# Patient Record
Sex: MALE | Race: WHITE | Employment: FULL TIME
[De-identification: names, ages, dates, MRNs, and addresses within clinical notes are randomized per-mention and may not be internally consistent; named-entity substitution may affect disease eponyms.]

---

## 2017-01-27 ENCOUNTER — OFFICE VISIT (OUTPATIENT)
Dept: FAMILY MEDICINE CLINIC | Facility: CLINIC | Age: 57
End: 2017-01-27

## 2017-01-27 VITALS
OXYGEN SATURATION: 96 % | RESPIRATION RATE: 26 BRPM | BODY MASS INDEX: 28.08 KG/M2 | HEART RATE: 64 BPM | WEIGHT: 174 LBS | SYSTOLIC BLOOD PRESSURE: 141 MMHG | DIASTOLIC BLOOD PRESSURE: 86 MMHG

## 2017-01-27 DIAGNOSIS — Z23 NEED FOR TDAP VACCINATION: ICD-10-CM

## 2017-01-27 DIAGNOSIS — I10 ESSENTIAL HYPERTENSION: Primary | ICD-10-CM

## 2017-01-27 DIAGNOSIS — I42.9 CARDIOMYOPATHY (HCC): ICD-10-CM

## 2017-01-27 DIAGNOSIS — I50.32 CHRONIC DIASTOLIC CONGESTIVE HEART FAILURE (HCC): ICD-10-CM

## 2017-01-27 DIAGNOSIS — Z12.11 ENCOUNTER FOR SCREENING COLONOSCOPY: ICD-10-CM

## 2017-01-27 DIAGNOSIS — I48.0 PAF (PAROXYSMAL ATRIAL FIBRILLATION) (HCC): ICD-10-CM

## 2017-01-27 PROCEDURE — 3017F COLORECTAL CA SCREEN DOC REV: CPT | Performed by: FAMILY MEDICINE

## 2017-01-27 PROCEDURE — G8419 CALC BMI OUT NRM PARAM NOF/U: HCPCS | Performed by: FAMILY MEDICINE

## 2017-01-27 PROCEDURE — G8484 FLU IMMUNIZE NO ADMIN: HCPCS | Performed by: FAMILY MEDICINE

## 2017-01-27 PROCEDURE — 90471 IMMUNIZATION ADMIN: CPT | Performed by: FAMILY MEDICINE

## 2017-01-27 PROCEDURE — 90715 TDAP VACCINE 7 YRS/> IM: CPT | Performed by: FAMILY MEDICINE

## 2017-01-27 PROCEDURE — G8427 DOCREV CUR MEDS BY ELIG CLIN: HCPCS | Performed by: FAMILY MEDICINE

## 2017-01-27 PROCEDURE — 1036F TOBACCO NON-USER: CPT | Performed by: FAMILY MEDICINE

## 2017-01-27 PROCEDURE — 99214 OFFICE O/P EST MOD 30 MIN: CPT | Performed by: FAMILY MEDICINE

## 2017-01-27 RX ORDER — LISINOPRIL 10 MG/1
10 TABLET ORAL DAILY
Qty: 90 TABLET | Refills: 1 | Status: SHIPPED | OUTPATIENT
Start: 2017-01-27 | End: 2017-09-25 | Stop reason: SDUPTHER

## 2017-01-27 ASSESSMENT — ENCOUNTER SYMPTOMS
BLOOD IN STOOL: 0
WHEEZING: 0
SORE THROAT: 0
COUGH: 0
TROUBLE SWALLOWING: 0
EYE PAIN: 0
SHORTNESS OF BREATH: 0
VOMITING: 0
ABDOMINAL PAIN: 0
DIARRHEA: 0

## 2017-01-31 DIAGNOSIS — E78.00 HYPERCHOLESTEREMIA: Primary | ICD-10-CM

## 2017-01-31 RX ORDER — ATORVASTATIN CALCIUM 40 MG/1
40 TABLET, FILM COATED ORAL DAILY
Qty: 30 TABLET | Refills: 2 | Status: ON HOLD | OUTPATIENT
Start: 2017-01-31 | End: 2018-01-03

## 2017-09-26 RX ORDER — LISINOPRIL 10 MG/1
10 TABLET ORAL DAILY
Qty: 90 TABLET | Refills: 1 | Status: ON HOLD | OUTPATIENT
Start: 2017-09-26 | End: 2018-01-05 | Stop reason: HOSPADM

## 2018-01-02 ENCOUNTER — APPOINTMENT (OUTPATIENT)
Dept: GENERAL RADIOLOGY | Age: 58
DRG: 310 | End: 2018-01-02
Payer: COMMERCIAL

## 2018-01-02 ENCOUNTER — HOSPITAL ENCOUNTER (INPATIENT)
Age: 58
LOS: 1 days | Discharge: HOME | DRG: 310 | End: 2018-01-05
Attending: EMERGENCY MEDICINE | Admitting: INTERNAL MEDICINE
Payer: COMMERCIAL

## 2018-01-02 DIAGNOSIS — R07.9 CHEST PAIN, UNSPECIFIED TYPE: Primary | ICD-10-CM

## 2018-01-02 LAB
ABSOLUTE EOS #: 0.1 K/UL (ref 0–0.4)
ABSOLUTE IMMATURE GRANULOCYTE: ABNORMAL K/UL (ref 0–0.3)
ABSOLUTE LYMPH #: 1 K/UL (ref 1–4.8)
ABSOLUTE MONO #: 1.2 K/UL (ref 0.1–1.3)
ANION GAP SERPL CALCULATED.3IONS-SCNC: 15 MMOL/L (ref 9–17)
BASOPHILS # BLD: 1 % (ref 0–2)
BASOPHILS ABSOLUTE: 0.1 K/UL (ref 0–0.2)
BNP INTERPRETATION: ABNORMAL
BUN BLDV-MCNC: 30 MG/DL (ref 6–20)
BUN/CREAT BLD: ABNORMAL (ref 9–20)
CALCIUM SERPL-MCNC: 10 MG/DL (ref 8.6–10.4)
CHLORIDE BLD-SCNC: 101 MMOL/L (ref 98–107)
CO2: 26 MMOL/L (ref 20–31)
CREAT SERPL-MCNC: 1.02 MG/DL (ref 0.7–1.2)
DIFFERENTIAL TYPE: ABNORMAL
DIGOXIN DATE LAST DOSE: NORMAL
DIGOXIN DOSE AMOUNT: NORMAL
DIGOXIN DOSE TIME: NORMAL
DIGOXIN LEVEL: 0.5 NG/ML (ref 0.5–2)
EOSINOPHILS RELATIVE PERCENT: 1 % (ref 0–4)
GFR AFRICAN AMERICAN: >60 ML/MIN
GFR NON-AFRICAN AMERICAN: >60 ML/MIN
GFR SERPL CREATININE-BSD FRML MDRD: ABNORMAL ML/MIN/{1.73_M2}
GFR SERPL CREATININE-BSD FRML MDRD: ABNORMAL ML/MIN/{1.73_M2}
GLUCOSE BLD-MCNC: 98 MG/DL (ref 70–99)
HCT VFR BLD CALC: 42.4 % (ref 41–53)
HEMOGLOBIN: 14.3 G/DL (ref 13.5–17.5)
IMMATURE GRANULOCYTES: ABNORMAL %
LYMPHOCYTES # BLD: 12 % (ref 24–44)
MCH RBC QN AUTO: 30.9 PG (ref 26–34)
MCHC RBC AUTO-ENTMCNC: 33.7 G/DL (ref 31–37)
MCV RBC AUTO: 91.8 FL (ref 80–100)
MONOCYTES # BLD: 14 % (ref 1–7)
PDW BLD-RTO: 11.8 % (ref 11.5–14.9)
PLATELET # BLD: 288 K/UL (ref 150–450)
PLATELET ESTIMATE: ABNORMAL
PMV BLD AUTO: 9.9 FL (ref 6–12)
POTASSIUM SERPL-SCNC: 4.5 MMOL/L (ref 3.7–5.3)
PRO-BNP: 403 PG/ML
RBC # BLD: 4.61 M/UL (ref 4.5–5.9)
RBC # BLD: ABNORMAL 10*6/UL
SEG NEUTROPHILS: 72 % (ref 36–66)
SEGMENTED NEUTROPHILS ABSOLUTE COUNT: 6 K/UL (ref 1.3–9.1)
SODIUM BLD-SCNC: 142 MMOL/L (ref 135–144)
TROPONIN INTERP: NORMAL
TROPONIN INTERP: NORMAL
TROPONIN T: <0.03 NG/ML
TROPONIN T: <0.03 NG/ML
WBC # BLD: 8.5 K/UL (ref 3.5–11)
WBC # BLD: ABNORMAL 10*3/UL

## 2018-01-02 PROCEDURE — 80048 BASIC METABOLIC PNL TOTAL CA: CPT

## 2018-01-02 PROCEDURE — 99285 EMERGENCY DEPT VISIT HI MDM: CPT

## 2018-01-02 PROCEDURE — 80162 ASSAY OF DIGOXIN TOTAL: CPT

## 2018-01-02 PROCEDURE — 84484 ASSAY OF TROPONIN QUANT: CPT

## 2018-01-02 PROCEDURE — 71046 X-RAY EXAM CHEST 2 VIEWS: CPT

## 2018-01-02 PROCEDURE — 2580000003 HC RX 258: Performed by: INTERNAL MEDICINE

## 2018-01-02 PROCEDURE — G0378 HOSPITAL OBSERVATION PER HR: HCPCS

## 2018-01-02 PROCEDURE — 83880 ASSAY OF NATRIURETIC PEPTIDE: CPT

## 2018-01-02 PROCEDURE — 36415 COLL VENOUS BLD VENIPUNCTURE: CPT

## 2018-01-02 PROCEDURE — 93005 ELECTROCARDIOGRAM TRACING: CPT

## 2018-01-02 PROCEDURE — 85025 COMPLETE CBC W/AUTO DIFF WBC: CPT

## 2018-01-02 PROCEDURE — 6370000000 HC RX 637 (ALT 250 FOR IP): Performed by: EMERGENCY MEDICINE

## 2018-01-02 RX ORDER — SODIUM CHLORIDE 0.9 % (FLUSH) 0.9 %
10 SYRINGE (ML) INJECTION EVERY 12 HOURS SCHEDULED
Status: DISCONTINUED | OUTPATIENT
Start: 2018-01-02 | End: 2018-01-05 | Stop reason: HOSPADM

## 2018-01-02 RX ORDER — ACETAMINOPHEN 325 MG/1
650 TABLET ORAL EVERY 4 HOURS PRN
Status: DISCONTINUED | OUTPATIENT
Start: 2018-01-02 | End: 2018-01-05 | Stop reason: HOSPADM

## 2018-01-02 RX ORDER — SODIUM CHLORIDE 0.9 % (FLUSH) 0.9 %
10 SYRINGE (ML) INJECTION PRN
Status: DISCONTINUED | OUTPATIENT
Start: 2018-01-02 | End: 2018-01-05 | Stop reason: HOSPADM

## 2018-01-02 RX ORDER — ASPIRIN 81 MG/1
324 TABLET, CHEWABLE ORAL ONCE
Status: COMPLETED | OUTPATIENT
Start: 2018-01-02 | End: 2018-01-02

## 2018-01-02 RX ADMIN — ASPIRIN 81 MG 324 MG: 81 TABLET ORAL at 16:37

## 2018-01-02 RX ADMIN — Medication 10 ML: at 22:25

## 2018-01-02 ASSESSMENT — PAIN SCALES - GENERAL: PAINLEVEL_OUTOF10: 5

## 2018-01-02 ASSESSMENT — ENCOUNTER SYMPTOMS
VOMITING: 0
ABDOMINAL PAIN: 0
COLOR CHANGE: 0
COUGH: 0
EYE PAIN: 0
CHEST TIGHTNESS: 1
SORE THROAT: 0
NAUSEA: 0
SHORTNESS OF BREATH: 0
RHINORRHEA: 0

## 2018-01-02 NOTE — ED PROVIDER NOTES
indicates no known allergies. Home Medications:  Prior to Admission medications    Medication Sig Start Date End Date Taking? Authorizing Provider   lisinopril (PRINIVIL;ZESTRIL) 10 MG tablet Take 1 tablet by mouth daily 9/26/17  Yes Christina Perez MD   metoprolol succinate (TOPROL XL) 25 MG extended release tablet Take 25 mg by mouth daily  9/17/16  Yes Historical Provider, MD   apixaban (ELIQUIS) 5 MG TABS tablet Take 5 mg by mouth 2 times daily. Yes Historical Provider, MD   furosemide (LASIX) 20 MG tablet Take 20 mg by mouth daily  2/17/15  Yes Historical Provider, MD   DIGITEK 125 MCG tablet Take 125 mcg by mouth daily  2/17/15  Yes Historical Provider, MD   amiodarone (CORDARONE) 200 MG tablet Take 200 mg by mouth daily  2/17/15  Yes Historical Provider, MD   aspirin 81 MG tablet Take 81 mg by mouth daily. Yes Historical Provider, MD   atorvastatin (LIPITOR) 40 MG tablet Take 1 tablet by mouth daily 1/31/17   Maury Meneses MD       REVIEW OF SYSTEMS    (2-9 systems for level 4, 10 or more for level 5)      Review of Systems   Constitutional: Negative for chills and fever. HENT: Negative for rhinorrhea and sore throat. Eyes: Negative for pain and visual disturbance. Respiratory: Positive for chest tightness. Negative for cough and shortness of breath. Cardiovascular: Positive for chest pain. Negative for palpitations. Gastrointestinal: Negative for abdominal pain, nausea and vomiting. Genitourinary: Negative for difficulty urinating and dysuria. Musculoskeletal: Negative for arthralgias and myalgias. Skin: Negative for color change and wound. Neurological: Negative for weakness, numbness and headaches. Psychiatric/Behavioral: Negative for behavioral problems and dysphoric mood.        PHYSICAL EXAM   (up to 7 for level 4, 8 or more for level 5)      INITIAL VITALS:   BP (!) 107/50   Pulse 82   Temp 98.3 °F (36.8 °C) (Oral)   Resp 16   Ht 5' 6\" (1.676 m)   Wt 164 lb (74.4 kg)   SpO2 95%   BMI 26.47 kg/m²     Physical Exam   Constitutional: He is oriented to person, place, and time. He appears well-developed and well-nourished. No distress. HENT:   Head: Normocephalic and atraumatic. Mouth/Throat: Oropharynx is clear and moist.   Eyes: EOM are normal. Pupils are equal, round, and reactive to light. Neck: Normal range of motion. Cardiovascular: Normal rate. An irregularly irregular rhythm present. Pulmonary/Chest: Effort normal. He has no wheezes. He has no rales. Abdominal: Soft. There is no tenderness. There is no rebound and no guarding. Musculoskeletal: Normal range of motion. Neurological: He is alert and oriented to person, place, and time. He has normal strength. GCS eye subscore is 4. GCS verbal subscore is 5. GCS motor subscore is 6. Skin: Skin is warm and dry.    Psychiatric: His behavior is normal.       DIFFERENTIAL  DIAGNOSIS     PLAN (LABS / IMAGING / EKG):  Orders Placed This Encounter   Procedures    XR CHEST STANDARD (2 VW)    CBC Auto Differential    Basic Metabolic Panel    Brain Natriuretic Peptide    TROPONIN    Digoxin Level    Troponin    DIET CARDIAC;    Telemetry monitoring    Vital signs per unit routine    Notify physician    Notify physician    Place intermittent pneumatic compression device    Full Code    Inpatient consult to Internal Medicine    Inpatient consult to Cardiology    Inpatient consult to Spiritual Services    EKG 12 Lead    PATIENT STATUS (FROM ED OR OR/PROCEDURAL) Observation       MEDICATIONS ORDERED:  Orders Placed This Encounter   Medications    aspirin chewable tablet 324 mg    sodium chloride flush 0.9 % injection 10 mL    sodium chloride flush 0.9 % injection 10 mL    acetaminophen (TYLENOL) tablet 650 mg    apixaban (ELIQUIS) tablet 5 mg       DIAGNOSTIC RESULTS / EMERGENCY DEPARTMENT COURSE / MDM     LABS:  Results for orders placed or performed during the hospital encounter of 01/02/18   CBC Auto Differential   Result Value Ref Range    WBC 8.5 3.5 - 11.0 k/uL    RBC 4.61 4.5 - 5.9 m/uL    Hemoglobin 14.3 13.5 - 17.5 g/dL    Hematocrit 42.4 41 - 53 %    MCV 91.8 80 - 100 fL    MCH 30.9 26 - 34 pg    MCHC 33.7 31 - 37 g/dL    RDW 11.8 11.5 - 14.9 %    Platelets 740 808 - 281 k/uL    MPV 9.9 6.0 - 12.0 fL    Differential Type NOT REPORTED     Seg Neutrophils 72 (H) 36 - 66 %    Lymphocytes 12 (L) 24 - 44 %    Monocytes 14 (H) 1 - 7 %    Eosinophils % 1 0 - 4 %    Basophils 1 0 - 2 %    Immature Granulocytes NOT REPORTED 0 %    Segs Absolute 6.00 1.3 - 9.1 k/uL    Absolute Lymph # 1.00 1.0 - 4.8 k/uL    Absolute Mono # 1.20 0.1 - 1.3 k/uL    Absolute Eos # 0.10 0.0 - 0.4 k/uL    Basophils # 0.10 0.0 - 0.2 k/uL    Absolute Immature Granulocyte NOT REPORTED 0.00 - 0.30 k/uL    WBC Morphology NOT REPORTED     RBC Morphology NOT REPORTED     Platelet Estimate NOT REPORTED    Basic Metabolic Panel   Result Value Ref Range    Glucose 98 70 - 99 mg/dL    BUN 30 (H) 6 - 20 mg/dL    CREATININE 1.02 0.70 - 1.20 mg/dL    Bun/Cre Ratio NOT REPORTED 9 - 20    Calcium 10.0 8.6 - 10.4 mg/dL    Sodium 142 135 - 144 mmol/L    Potassium 4.5 3.7 - 5.3 mmol/L    Chloride 101 98 - 107 mmol/L    CO2 26 20 - 31 mmol/L    Anion Gap 15 9 - 17 mmol/L    GFR Non-African American >60 >60 mL/min    GFR African American >60 >60 mL/min    GFR Comment          GFR Staging NOT REPORTED    Brain Natriuretic Peptide   Result Value Ref Range    Pro- (H) <300 pg/mL    BNP Interpretation         TROPONIN   Result Value Ref Range    Troponin T <0.03 <0.03 ng/mL    Troponin Interp         Digoxin Level   Result Value Ref Range    Digoxin Lvl 0.5 0.5 - 2.0 ng/mL    Digoxin Dose Amount NOT GIVEN     Digoxin Date Last Dose NOT GIVEN     Digoxin Dose Time NOT GIVEN    Troponin   Result Value Ref Range    Troponin T <0.03 <0.03 ng/mL    Troponin Interp         EKG 12 Lead   Result Value Ref Range    Ventricular Rate 95 BPM Atrial Rate 108 BPM    QRS Duration 86 ms    Q-T Interval 338 ms    QTc Calculation (Bazett) 424 ms    R Axis -15 degrees    T Axis 8 degrees       IMPRESSION: Patient presents with chest tightness that worsens with exertion. On exam, patient's afebrile and hemodynamically stable. EKG shows A. fib, with a normal rate. No abnormal heart sounds heard, lungs are clear to auscultation, abdominal exam is benign. Differentials include ACS or PE. Low suspicion for PE as patient is already anticoagulated on Eliquis. Patient does have some risk factors for ACS. Given his presentation, we'll plan to perform a cardiac workup, obtain a digoxin level and reassess. RADIOLOGY:  Xr Chest Standard (2 Vw)    Result Date: 1/2/2018  EXAMINATION: TWO VIEWS OF THE CHEST 1/2/2018 4:36 pm COMPARISON: None. HISTORY: ORDERING SYSTEM PROVIDED HISTORY: chest pain TECHNOLOGIST PROVIDED HISTORY: Reason for exam:->chest pain Ordering Physician Provided Reason for Exam: chest pain Acuity: Acute Type of Exam: Initial FINDINGS: Heart appears normal in size. Lungs are hyperinflated without focal lung consolidation, pneumothorax, pleural effusion or free air. Osseous structures appear grossly intact. No acute process. EKG  None    All EKG's are interpreted by the Emergency Department Physician who either signs or Co-signs this chart in the absence of a cardiologist.    EMERGENCY DEPARTMENT COURSE:  Patient was updated on lab results and imaging studies. Discussed that given his presentation, would like to have him admitted for further evaluation by cardiology. Patient demonstrated his understanding and is agreeable to the plan. He is hemodynamically stable and awaiting transfer to floor. PROCEDURES:  None    CONSULTS:  IP CONSULT TO INTERNAL MEDICINE  IP CONSULT TO CARDIOLOGY  IP CONSULT TO SPIRITUAL SERVICES    CRITICAL CARE:  None    FINAL IMPRESSION      1.  Chest pain, unspecified type          DISPOSITION / PLAN DISPOSITION Admitted 01/02/2018 06:42:01 PM      PATIENT REFERRED TO:  No follow-up provider specified.     DISCHARGE MEDICATIONS:  Current Discharge Medication List          Brennan Chadwick MD  Emergency Medicine Resident    (Please note that portions of this note were completed with a voice recognition program.  Efforts were made to edit the dictations but occasionally words are mis-transcribed.)       Brennan Chadwick MD  Resident  01/03/18 3972

## 2018-01-03 ENCOUNTER — APPOINTMENT (OUTPATIENT)
Dept: NUCLEAR MEDICINE | Age: 58
DRG: 310 | End: 2018-01-03
Payer: COMMERCIAL

## 2018-01-03 PROBLEM — I48.91 ATRIAL FIBRILLATION (HCC): Status: ACTIVE | Noted: 2018-01-03

## 2018-01-03 LAB
ANION GAP SERPL CALCULATED.3IONS-SCNC: 12 MMOL/L (ref 9–17)
BUN BLDV-MCNC: 23 MG/DL (ref 6–20)
BUN/CREAT BLD: ABNORMAL (ref 9–20)
CALCIUM SERPL-MCNC: 9.7 MG/DL (ref 8.6–10.4)
CHLORIDE BLD-SCNC: 100 MMOL/L (ref 98–107)
CHOLESTEROL/HDL RATIO: 4.6
CHOLESTEROL: 152 MG/DL
CO2: 27 MMOL/L (ref 20–31)
CREAT SERPL-MCNC: 0.77 MG/DL (ref 0.7–1.2)
ESTIMATED AVERAGE GLUCOSE: 114 MG/DL
GFR AFRICAN AMERICAN: >60 ML/MIN
GFR NON-AFRICAN AMERICAN: >60 ML/MIN
GFR SERPL CREATININE-BSD FRML MDRD: ABNORMAL ML/MIN/{1.73_M2}
GFR SERPL CREATININE-BSD FRML MDRD: ABNORMAL ML/MIN/{1.73_M2}
GLUCOSE BLD-MCNC: 109 MG/DL (ref 70–99)
HBA1C MFR BLD: 5.6 % (ref 4–6)
HCT VFR BLD CALC: 42.1 % (ref 41–53)
HDLC SERPL-MCNC: 33 MG/DL
HEMOGLOBIN: 14 G/DL (ref 13.5–17.5)
LDL CHOLESTEROL: 94 MG/DL (ref 0–130)
LV EF: 48 %
LV EF: 50 %
LVEF MODALITY: NORMAL
LVEF MODALITY: NORMAL
MAGNESIUM: 2.3 MG/DL (ref 1.6–2.6)
MCH RBC QN AUTO: 30.6 PG (ref 26–34)
MCHC RBC AUTO-ENTMCNC: 33.2 G/DL (ref 31–37)
MCV RBC AUTO: 91.9 FL (ref 80–100)
PDW BLD-RTO: 12 % (ref 11.5–14.9)
PLATELET # BLD: 270 K/UL (ref 150–450)
PMV BLD AUTO: 9.4 FL (ref 6–12)
POTASSIUM SERPL-SCNC: 4.7 MMOL/L (ref 3.7–5.3)
RBC # BLD: 4.58 M/UL (ref 4.5–5.9)
SODIUM BLD-SCNC: 139 MMOL/L (ref 135–144)
THYROXINE, FREE: 3.96 NG/DL (ref 0.93–1.7)
TRIGL SERPL-MCNC: 124 MG/DL
TSH SERPL DL<=0.05 MIU/L-ACNC: <0.01 MIU/L (ref 0.3–5)
VLDLC SERPL CALC-MCNC: ABNORMAL MG/DL (ref 1–30)
WBC # BLD: 5.3 K/UL (ref 3.5–11)

## 2018-01-03 PROCEDURE — 84443 ASSAY THYROID STIM HORMONE: CPT

## 2018-01-03 PROCEDURE — 83735 ASSAY OF MAGNESIUM: CPT

## 2018-01-03 PROCEDURE — 6370000000 HC RX 637 (ALT 250 FOR IP): Performed by: NURSE PRACTITIONER

## 2018-01-03 PROCEDURE — 3430000000 HC RX DIAGNOSTIC RADIOPHARMACEUTICAL: Performed by: NURSE PRACTITIONER

## 2018-01-03 PROCEDURE — 3430000000 HC RX DIAGNOSTIC RADIOPHARMACEUTICAL: Performed by: INTERNAL MEDICINE

## 2018-01-03 PROCEDURE — 2580000003 HC RX 258: Performed by: INTERNAL MEDICINE

## 2018-01-03 PROCEDURE — 84439 ASSAY OF FREE THYROXINE: CPT

## 2018-01-03 PROCEDURE — G0378 HOSPITAL OBSERVATION PER HR: HCPCS

## 2018-01-03 PROCEDURE — 6360000002 HC RX W HCPCS: Performed by: NURSE PRACTITIONER

## 2018-01-03 PROCEDURE — 83036 HEMOGLOBIN GLYCOSYLATED A1C: CPT

## 2018-01-03 PROCEDURE — 85027 COMPLETE CBC AUTOMATED: CPT

## 2018-01-03 PROCEDURE — 99223 1ST HOSP IP/OBS HIGH 75: CPT | Performed by: INTERNAL MEDICINE

## 2018-01-03 PROCEDURE — 6370000000 HC RX 637 (ALT 250 FOR IP): Performed by: INTERNAL MEDICINE

## 2018-01-03 PROCEDURE — 36415 COLL VENOUS BLD VENIPUNCTURE: CPT

## 2018-01-03 PROCEDURE — 80162 ASSAY OF DIGOXIN TOTAL: CPT

## 2018-01-03 PROCEDURE — 93306 TTE W/DOPPLER COMPLETE: CPT

## 2018-01-03 PROCEDURE — 78452 HT MUSCLE IMAGE SPECT MULT: CPT

## 2018-01-03 PROCEDURE — 2580000003 HC RX 258: Performed by: NURSE PRACTITIONER

## 2018-01-03 PROCEDURE — 80048 BASIC METABOLIC PNL TOTAL CA: CPT

## 2018-01-03 PROCEDURE — A9500 TC99M SESTAMIBI: HCPCS | Performed by: NURSE PRACTITIONER

## 2018-01-03 PROCEDURE — A9500 TC99M SESTAMIBI: HCPCS | Performed by: INTERNAL MEDICINE

## 2018-01-03 PROCEDURE — 93017 CV STRESS TEST TRACING ONLY: CPT

## 2018-01-03 PROCEDURE — 80061 LIPID PANEL: CPT

## 2018-01-03 RX ORDER — AMINOPHYLLINE DIHYDRATE 25 MG/ML
100 INJECTION, SOLUTION INTRAVENOUS
Status: ACTIVE | OUTPATIENT
Start: 2018-01-03 | End: 2018-01-03

## 2018-01-03 RX ORDER — LISINOPRIL 10 MG/1
10 TABLET ORAL DAILY
Status: DISCONTINUED | OUTPATIENT
Start: 2018-01-03 | End: 2018-01-05

## 2018-01-03 RX ORDER — MAGNESIUM SULFATE 1 G/100ML
1 INJECTION INTRAVENOUS PRN
Status: DISCONTINUED | OUTPATIENT
Start: 2018-01-03 | End: 2018-01-05 | Stop reason: HOSPADM

## 2018-01-03 RX ORDER — BISACODYL 10 MG
10 SUPPOSITORY, RECTAL RECTAL DAILY PRN
Status: DISCONTINUED | OUTPATIENT
Start: 2018-01-03 | End: 2018-01-05 | Stop reason: HOSPADM

## 2018-01-03 RX ORDER — AMIODARONE HYDROCHLORIDE 200 MG/1
100 TABLET ORAL DAILY
Status: DISCONTINUED | OUTPATIENT
Start: 2018-01-03 | End: 2018-01-03

## 2018-01-03 RX ORDER — POTASSIUM CHLORIDE 7.45 MG/ML
10 INJECTION INTRAVENOUS PRN
Status: DISCONTINUED | OUTPATIENT
Start: 2018-01-03 | End: 2018-01-05 | Stop reason: HOSPADM

## 2018-01-03 RX ORDER — METOPROLOL SUCCINATE 25 MG/1
25 TABLET, EXTENDED RELEASE ORAL 2 TIMES DAILY
Status: DISCONTINUED | OUTPATIENT
Start: 2018-01-03 | End: 2018-01-04

## 2018-01-03 RX ORDER — DOCUSATE SODIUM 100 MG/1
100 CAPSULE, LIQUID FILLED ORAL 2 TIMES DAILY
Status: DISCONTINUED | OUTPATIENT
Start: 2018-01-03 | End: 2018-01-05 | Stop reason: HOSPADM

## 2018-01-03 RX ORDER — POTASSIUM CHLORIDE 20MEQ/15ML
40 LIQUID (ML) ORAL PRN
Status: DISCONTINUED | OUTPATIENT
Start: 2018-01-03 | End: 2018-01-05 | Stop reason: HOSPADM

## 2018-01-03 RX ORDER — FUROSEMIDE 20 MG/1
20 TABLET ORAL DAILY
Status: DISCONTINUED | OUTPATIENT
Start: 2018-01-03 | End: 2018-01-03

## 2018-01-03 RX ORDER — ONDANSETRON 2 MG/ML
4 INJECTION INTRAMUSCULAR; INTRAVENOUS EVERY 6 HOURS PRN
Status: DISCONTINUED | OUTPATIENT
Start: 2018-01-03 | End: 2018-01-05 | Stop reason: HOSPADM

## 2018-01-03 RX ORDER — NITROGLYCERIN 0.4 MG/1
0.4 TABLET SUBLINGUAL EVERY 5 MIN PRN
Status: ACTIVE | OUTPATIENT
Start: 2018-01-03 | End: 2018-01-04

## 2018-01-03 RX ORDER — 0.9 % SODIUM CHLORIDE 0.9 %
250 INTRAVENOUS SOLUTION INTRAVENOUS ONCE
Status: DISCONTINUED | OUTPATIENT
Start: 2018-01-03 | End: 2018-01-05 | Stop reason: HOSPADM

## 2018-01-03 RX ORDER — SODIUM CHLORIDE 0.9 % (FLUSH) 0.9 %
10 SYRINGE (ML) INJECTION PRN
Status: DISCONTINUED | OUTPATIENT
Start: 2018-01-03 | End: 2018-01-05 | Stop reason: HOSPADM

## 2018-01-03 RX ORDER — DIGOXIN 125 MCG
125 TABLET ORAL DAILY
Status: DISCONTINUED | OUTPATIENT
Start: 2018-01-03 | End: 2018-01-05 | Stop reason: HOSPADM

## 2018-01-03 RX ORDER — SODIUM CHLORIDE 0.9 % (FLUSH) 0.9 %
10 SYRINGE (ML) INJECTION PRN
Status: ACTIVE | OUTPATIENT
Start: 2018-01-03 | End: 2018-01-04

## 2018-01-03 RX ORDER — NITROGLYCERIN 0.4 MG/1
0.4 TABLET SUBLINGUAL EVERY 5 MIN PRN
Status: DISCONTINUED | OUTPATIENT
Start: 2018-01-03 | End: 2018-01-05 | Stop reason: HOSPADM

## 2018-01-03 RX ORDER — ASPIRIN 81 MG/1
81 TABLET, CHEWABLE ORAL DAILY
Status: DISCONTINUED | OUTPATIENT
Start: 2018-01-03 | End: 2018-01-05 | Stop reason: HOSPADM

## 2018-01-03 RX ORDER — METOPROLOL SUCCINATE 25 MG/1
25 TABLET, EXTENDED RELEASE ORAL DAILY
Status: DISCONTINUED | OUTPATIENT
Start: 2018-01-03 | End: 2018-01-03

## 2018-01-03 RX ORDER — METOPROLOL TARTRATE 5 MG/5ML
2.5 INJECTION INTRAVENOUS PRN
Status: ACTIVE | OUTPATIENT
Start: 2018-01-03 | End: 2018-01-04

## 2018-01-03 RX ORDER — POTASSIUM CHLORIDE 20 MEQ/1
40 TABLET, EXTENDED RELEASE ORAL PRN
Status: DISCONTINUED | OUTPATIENT
Start: 2018-01-03 | End: 2018-01-05 | Stop reason: HOSPADM

## 2018-01-03 RX ADMIN — DIGOXIN 125 MCG: 125 TABLET ORAL at 12:53

## 2018-01-03 RX ADMIN — APIXABAN 5 MG: 5 TABLET, FILM COATED ORAL at 12:52

## 2018-01-03 RX ADMIN — REGADENOSON 0.4 MG: 0.08 INJECTION, SOLUTION INTRAVENOUS at 10:34

## 2018-01-03 RX ADMIN — ASPIRIN 81 MG 81 MG: 81 TABLET ORAL at 12:53

## 2018-01-03 RX ADMIN — DOCUSATE SODIUM 100 MG: 100 CAPSULE, LIQUID FILLED ORAL at 12:52

## 2018-01-03 RX ADMIN — LISINOPRIL 10 MG: 10 TABLET ORAL at 12:53

## 2018-01-03 RX ADMIN — Medication 10 ML: at 07:35

## 2018-01-03 RX ADMIN — APIXABAN 5 MG: 5 TABLET, FILM COATED ORAL at 21:20

## 2018-01-03 RX ADMIN — TETRAKIS(2-METHOXYISOBUTYLISOCYANIDE)COPPER(I) TETRAFLUOROBORATE 11.15 MILLICURIE: 1 INJECTION, POWDER, LYOPHILIZED, FOR SOLUTION INTRAVENOUS at 07:35

## 2018-01-03 RX ADMIN — Medication 10 ML: at 10:19

## 2018-01-03 RX ADMIN — TETRAKIS(2-METHOXYISOBUTYLISOCYANIDE)COPPER(I) TETRAFLUOROBORATE 40.6 MILLICURIE: 1 INJECTION, POWDER, LYOPHILIZED, FOR SOLUTION INTRAVENOUS at 10:36

## 2018-01-03 RX ADMIN — METOPROLOL SUCCINATE 25 MG: 25 TABLET, EXTENDED RELEASE ORAL at 21:20

## 2018-01-03 RX ADMIN — Medication 10 ML: at 10:34

## 2018-01-03 RX ADMIN — Medication 10 ML: at 12:53

## 2018-01-03 RX ADMIN — DOCUSATE SODIUM 100 MG: 100 CAPSULE, LIQUID FILLED ORAL at 21:20

## 2018-01-03 ASSESSMENT — ENCOUNTER SYMPTOMS
DOUBLE VISION: 0
SHORTNESS OF BREATH: 1
BACK PAIN: 0
SPUTUM PRODUCTION: 0
WHEEZING: 0
BLURRED VISION: 0
NAUSEA: 0
ABDOMINAL PAIN: 0
VOMITING: 0
COUGH: 0
CONSTIPATION: 0
ORTHOPNEA: 0
SORE THROAT: 0
DIARRHEA: 0

## 2018-01-03 NOTE — ED PROVIDER NOTES
medications, social and family history as documented unless otherwise noted below. Documentation of the HPI, Physical Exam and Medical Decision Making performed by medical students or scribes is based on my personal performance of the HPI, PE and MDM. For Phys Assistant/ Nurse Practitioner cases/documentation I have had a face to face evaluation of this patient and have completed at least one if not all key elements of the E/M (history, physical exam, and MDM). Additional findings are as noted.     Kamille Weiss MD  Attending Emergency  Physician                Kamille Weiss MD  01/02/18 7019

## 2018-01-03 NOTE — PLAN OF CARE
Problem: Pain:  Goal: Pain level will decrease  Pain level will decrease   Outcome: Ongoing  Patient denies pain so far this shift.

## 2018-01-03 NOTE — FLOWSHEET NOTE
01/03/18 1808   Encounter Summary   Services provided to: Patient   Referral/Consult From: Corrine Solorzano Visiting (1/3/18)   Complexity of Encounter Low   Length of Encounter 15 minutes   Routine   Type Follow up   Spiritual/Amish   Type Ritual   Sacraments   Sacrament of Sick-Anointing Anointed  (Jagruti Gil 1/3/18)

## 2018-01-03 NOTE — CARE COORDINATION
[facility]  CHF Core Measure Compliance  Work List    Please add the following education teaching points to education template for CHF Education. 1. Diet:  2. Activity  3. Follow up  4. Medications  5. Weight Monitoring  6. Symptoms worsening    Education Information given to patient on admission:  Heart Failure, Patient Guide, Patient Handbook, CHF Zone sheet: Yes      LV Function Assessment   Are there ECHO results on the chart  Yes  If no is there documentation from the physician stated why a ECHO was not ordered Not Applicable  If no ECHO results or documentation  was physician notified Not Applicable    8/14/6556, EF 34%. this was done prior to a cardioversion. If LVSD is present: (EF <40%)  Is there an ACE-1/ARB ordered? Yes  If neither ordered did physician document contraindication? Not Applicable  If no was the physician notified Not Applicable  If no ACE/ARB there must be a documented reason in the chart    Follow up after discharge:  Notify  to schedule follow up visit prior to discharge and place on AVS summary under Follow Up Section ( Appointment must be within 7 days of discharge). If no PCP the  may call [facility] and arrange follow up visit. If at night please leave the  a sticky note. If at night place a sticky note to the physician regarding the appropriateness of [address] and a comment in [facility] for the nurse to discuss with physician in a.m. If during business hours notify physician. Yes    If referral order is obtained (after 11/10/14) place the CHF consult for IP into [facility]. The CHF clinic will receive the order in their consults folder.

## 2018-01-03 NOTE — H&P
mother; Heart Disease in his father. SOCIAL HISTORY    Patient  reports that he has never smoked. He has never used smokeless tobacco. He reports that he does not drink alcohol or use drugs. HOME MEDICATIONS        Prior to Admission medications    Medication Sig Start Date End Date Taking? Authorizing Provider   lisinopril (PRINIVIL;ZESTRIL) 10 MG tablet Take 1 tablet by mouth daily 9/26/17  Yes Christina Colon MD   metoprolol succinate (TOPROL XL) 25 MG extended release tablet Take 25 mg by mouth daily  9/17/16  Yes Historical Provider, MD   apixaban (ELIQUIS) 5 MG TABS tablet Take 5 mg by mouth 2 times daily. Yes Historical Provider, MD   furosemide (LASIX) 20 MG tablet Take 20 mg by mouth daily  2/17/15  Yes Historical Provider, MD   DIGITEK 125 MCG tablet Take 125 mcg by mouth daily  2/17/15  Yes Historical Provider, MD   amiodarone (CORDARONE) 200 MG tablet Take 200 mg by mouth daily  2/17/15  Yes Historical Provider, MD   aspirin 81 MG tablet Take 81 mg by mouth daily. Yes Historical Provider, MD       ALLERGIES      Review of patient's allergies indicates no known allergies. REVIEW OF SYSTEMS     Review of Systems   Constitutional: Negative for chills, diaphoresis, fever and malaise/fatigue. HENT: Negative for congestion and sore throat. Eyes: Negative for blurred vision and double vision. Respiratory: Positive for shortness of breath. Negative for cough, sputum production and wheezing. Cardiovascular: Positive for chest pain. Negative for palpitations, orthopnea and leg swelling. Gastrointestinal: Negative for abdominal pain, constipation, diarrhea, nausea and vomiting. Genitourinary: Negative for dysuria, frequency and urgency. Musculoskeletal: Negative for back pain, falls and myalgias. Skin: Negative for itching and rash. Neurological: Negative for dizziness, sensory change, focal weakness, weakness and headaches.    Psychiatric/Behavioral: Negative for depression and

## 2018-01-03 NOTE — CONSULTS
beta-blocker. Continue to monitor his heart rhythm until tomorrow. Stress test was done today was negative for ischemia evidence of mildly   reduced ejection fraction this might be a misleading results considering underlying atrial fibrillation    2. Prior CAD with questionable thrombus , patient denies any knowledge  of prior stents. Denies any anginal symptoms today. 3. Hypertension. Blood pressure well controlled.                           Electronically signed by Iban Montanez MD on 1/3/2018 at 4:14 PM

## 2018-01-03 NOTE — CARE COORDINATION
ADMISSION NOTE       Patient admitted to room  2089. Time of admit:  1940    Admit from:  ER    Reason for admission:  Chest pain    Where patient has been residing for the last 24 hrs:  Private residence    Has the patient been admitted to any facility in the last 4 weeks, which one:  no    Family at bedside:  yes    Patient is currently in pain denies      Patient has been oriented to room, educated on how to use call light, and to call for assistance prior to getting up. Bed in lowest and locked position. 2 siderails up for safety. Call light within reach. Kami. Elenita Richardson 43  DVT Prophylaxis and Vaccine Status  Work List  Mandatory for all patients      Patient must be on both Chemical prophylaxis and Mechanical prophylaxis. If chemical/mechanical prophylaxis is not ordered, the physician must document a reason for not using prophylaxis     Chemical Prophylaxis  Is patient on chemical prophylaxis: No  If no chemical prophylaxis Is a order in for No Chemical VTE prophylaxisNo  If no was the physician notified home eliquis needs ordered with med rec.       Mechanical Prophylaxis  Is patient on mechanical prophylaxis, intermittent pneumatic compression device: Yes  If no was the physician notified not applicable        Pneumonia Vaccine  Vaccine indicated:  Not indicated  If indicated was the vaccine given: not applicable    Influenza Vaccine (applicable from October through March):  Vaccine indicated: Vaccination refused  If indicated was the vaccine given: not applicable    Patient Education  Education completed on DVT prophylaxis: yes

## 2018-01-03 NOTE — FLOWSHEET NOTE
RN notified Naina Philippe of pt arrival to the floor and requested admission orders. Naina Philippe told me to call Riccardo Belcher NP for admission orders.

## 2018-01-04 LAB
ANION GAP SERPL CALCULATED.3IONS-SCNC: 10 MMOL/L (ref 9–17)
BUN BLDV-MCNC: 17 MG/DL (ref 6–20)
BUN/CREAT BLD: ABNORMAL (ref 9–20)
CALCIUM SERPL-MCNC: 9.7 MG/DL (ref 8.6–10.4)
CHLORIDE BLD-SCNC: 101 MMOL/L (ref 98–107)
CO2: 26 MMOL/L (ref 20–31)
CREAT SERPL-MCNC: 0.64 MG/DL (ref 0.7–1.2)
DIGOXIN DATE LAST DOSE: ABNORMAL
DIGOXIN DOSE AMOUNT: ABNORMAL
DIGOXIN DOSE TIME: 1300
DIGOXIN LEVEL: 0.4 NG/ML (ref 0.5–2)
GFR AFRICAN AMERICAN: >60 ML/MIN
GFR NON-AFRICAN AMERICAN: >60 ML/MIN
GFR SERPL CREATININE-BSD FRML MDRD: ABNORMAL ML/MIN/{1.73_M2}
GFR SERPL CREATININE-BSD FRML MDRD: ABNORMAL ML/MIN/{1.73_M2}
GLUCOSE BLD-MCNC: 125 MG/DL (ref 70–99)
HCT VFR BLD CALC: 42 % (ref 41–53)
HEMOGLOBIN: 14 G/DL (ref 13.5–17.5)
MCH RBC QN AUTO: 30.4 PG (ref 26–34)
MCHC RBC AUTO-ENTMCNC: 33.3 G/DL (ref 31–37)
MCV RBC AUTO: 91.2 FL (ref 80–100)
PDW BLD-RTO: 11.6 % (ref 11.5–14.9)
PLATELET # BLD: 273 K/UL (ref 150–450)
PMV BLD AUTO: 9.7 FL (ref 6–12)
POTASSIUM SERPL-SCNC: 4.4 MMOL/L (ref 3.7–5.3)
RBC # BLD: 4.61 M/UL (ref 4.5–5.9)
SODIUM BLD-SCNC: 137 MMOL/L (ref 135–144)
WBC # BLD: 5.3 K/UL (ref 3.5–11)

## 2018-01-04 PROCEDURE — 2580000003 HC RX 258: Performed by: INTERNAL MEDICINE

## 2018-01-04 PROCEDURE — 6370000000 HC RX 637 (ALT 250 FOR IP): Performed by: NURSE PRACTITIONER

## 2018-01-04 PROCEDURE — 99232 SBSQ HOSP IP/OBS MODERATE 35: CPT | Performed by: INTERNAL MEDICINE

## 2018-01-04 PROCEDURE — 6370000000 HC RX 637 (ALT 250 FOR IP): Performed by: INTERNAL MEDICINE

## 2018-01-04 PROCEDURE — G0378 HOSPITAL OBSERVATION PER HR: HCPCS

## 2018-01-04 PROCEDURE — 36415 COLL VENOUS BLD VENIPUNCTURE: CPT

## 2018-01-04 PROCEDURE — 80048 BASIC METABOLIC PNL TOTAL CA: CPT

## 2018-01-04 PROCEDURE — 85027 COMPLETE CBC AUTOMATED: CPT

## 2018-01-04 PROCEDURE — 2060000000 HC ICU INTERMEDIATE R&B

## 2018-01-04 RX ORDER — METOPROLOL SUCCINATE 50 MG/1
50 TABLET, EXTENDED RELEASE ORAL 2 TIMES DAILY
Status: DISCONTINUED | OUTPATIENT
Start: 2018-01-04 | End: 2018-01-05

## 2018-01-04 RX ADMIN — APIXABAN 5 MG: 5 TABLET, FILM COATED ORAL at 20:56

## 2018-01-04 RX ADMIN — METOPROLOL SUCCINATE 50 MG: 50 TABLET, EXTENDED RELEASE ORAL at 20:56

## 2018-01-04 RX ADMIN — DIGOXIN 125 MCG: 125 TABLET ORAL at 10:35

## 2018-01-04 RX ADMIN — LISINOPRIL 10 MG: 10 TABLET ORAL at 08:25

## 2018-01-04 RX ADMIN — DOCUSATE SODIUM 100 MG: 100 CAPSULE, LIQUID FILLED ORAL at 20:57

## 2018-01-04 RX ADMIN — Medication 10 ML: at 20:58

## 2018-01-04 RX ADMIN — METOPROLOL SUCCINATE 25 MG: 25 TABLET, EXTENDED RELEASE ORAL at 08:25

## 2018-01-04 RX ADMIN — APIXABAN 5 MG: 5 TABLET, FILM COATED ORAL at 08:24

## 2018-01-04 RX ADMIN — Medication 10 ML: at 08:26

## 2018-01-04 RX ADMIN — Medication 10 ML: at 00:32

## 2018-01-04 RX ADMIN — ASPIRIN 81 MG 81 MG: 81 TABLET ORAL at 08:25

## 2018-01-04 RX ADMIN — DOCUSATE SODIUM 100 MG: 100 CAPSULE, LIQUID FILLED ORAL at 08:25

## 2018-01-04 ASSESSMENT — ENCOUNTER SYMPTOMS
DIARRHEA: 0
VOMITING: 0
SHORTNESS OF BREATH: 1
COUGH: 0
BLURRED VISION: 0
WHEEZING: 0
ORTHOPNEA: 0
SORE THROAT: 0
DOUBLE VISION: 0
CONSTIPATION: 0
BACK PAIN: 0
ABDOMINAL PAIN: 0
NAUSEA: 0
SPUTUM PRODUCTION: 0

## 2018-01-04 NOTE — PLAN OF CARE
Problem: Cardiac Output - Decreased:  Goal: Hemodynamic stability will improve  Hemodynamic stability will improve   Outcome: Ongoing      Problem: Pain:  Goal: Control of acute pain  Control of acute pain   Outcome: Met This Shift    Goal: Control of chronic pain  Control of chronic pain   Outcome: Met This Shift      Problem: Tissue Perfusion - Cardiopulmonary, Altered:  Goal: Circulatory function within specified parameters  Circulatory function within specified parameters   Outcome: Met This Shift    Goal: Hemodynamic stability will improve  Hemodynamic stability will improve   Outcome: Ongoing

## 2018-01-04 NOTE — PLAN OF CARE
Problem: Cardiac Output - Decreased:  Goal: Hemodynamic stability will improve  Hemodynamic stability will improve   Outcome: Ongoing  Vitals:    01/03/18 1245 01/03/18 1405 01/03/18 2039 01/04/18 0130   BP: 127/67 118/65 110/66 104/61   Pulse: 75 92 91 82   Resp: 16 14 16 16   Temp: 98.3 °F (36.8 °C) 98.6 °F (37 °C) 98.4 °F (36.9 °C) 98.7 °F (37.1 °C)   TempSrc:  Oral Oral Oral   SpO2: 98% 93% 94% 96%   Weight:       Height:           Pt Afib on telemetry; lung sounds clear bilaterally; adequate urine output; will continue to monitor hemodynamic stability. Problem: Pain:  Goal: Pain level will decrease  Pain level will decrease   Outcome: Ongoing  No pain at this time. 0/10 pain scale. Problem: Tissue Perfusion - Cardiopulmonary, Altered:  Goal: Absence of angina  Absence of angina   Outcome: Ongoing  Pt denies angina at this time; will continue to monitor for angina.

## 2018-01-05 VITALS
SYSTOLIC BLOOD PRESSURE: 116 MMHG | TEMPERATURE: 97.9 F | HEIGHT: 66 IN | RESPIRATION RATE: 16 BRPM | BODY MASS INDEX: 25.51 KG/M2 | WEIGHT: 158.73 LBS | OXYGEN SATURATION: 95 % | HEART RATE: 82 BPM | DIASTOLIC BLOOD PRESSURE: 60 MMHG

## 2018-01-05 LAB
ANION GAP SERPL CALCULATED.3IONS-SCNC: 10 MMOL/L (ref 9–17)
BUN BLDV-MCNC: 14 MG/DL (ref 6–20)
BUN/CREAT BLD: ABNORMAL (ref 9–20)
CALCIUM SERPL-MCNC: 9.9 MG/DL (ref 8.6–10.4)
CHLORIDE BLD-SCNC: 101 MMOL/L (ref 98–107)
CO2: 28 MMOL/L (ref 20–31)
CREAT SERPL-MCNC: 0.68 MG/DL (ref 0.7–1.2)
GFR AFRICAN AMERICAN: >60 ML/MIN
GFR NON-AFRICAN AMERICAN: >60 ML/MIN
GFR SERPL CREATININE-BSD FRML MDRD: ABNORMAL ML/MIN/{1.73_M2}
GFR SERPL CREATININE-BSD FRML MDRD: ABNORMAL ML/MIN/{1.73_M2}
GLUCOSE BLD-MCNC: 112 MG/DL (ref 70–99)
HCT VFR BLD CALC: 42.6 % (ref 41–53)
HEMOGLOBIN: 14.1 G/DL (ref 13.5–17.5)
MCH RBC QN AUTO: 30.7 PG (ref 26–34)
MCHC RBC AUTO-ENTMCNC: 33.1 G/DL (ref 31–37)
MCV RBC AUTO: 92.6 FL (ref 80–100)
PDW BLD-RTO: 12 % (ref 11.5–14.9)
PLATELET # BLD: 274 K/UL (ref 150–450)
PMV BLD AUTO: 9.7 FL (ref 6–12)
POTASSIUM SERPL-SCNC: 4.5 MMOL/L (ref 3.7–5.3)
RBC # BLD: 4.61 M/UL (ref 4.5–5.9)
SODIUM BLD-SCNC: 139 MMOL/L (ref 135–144)
WBC # BLD: 7.7 K/UL (ref 3.5–11)

## 2018-01-05 PROCEDURE — 2580000003 HC RX 258: Performed by: INTERNAL MEDICINE

## 2018-01-05 PROCEDURE — G0378 HOSPITAL OBSERVATION PER HR: HCPCS

## 2018-01-05 PROCEDURE — 6370000000 HC RX 637 (ALT 250 FOR IP): Performed by: INTERNAL MEDICINE

## 2018-01-05 PROCEDURE — 6370000000 HC RX 637 (ALT 250 FOR IP): Performed by: NURSE PRACTITIONER

## 2018-01-05 PROCEDURE — 36415 COLL VENOUS BLD VENIPUNCTURE: CPT

## 2018-01-05 PROCEDURE — 80048 BASIC METABOLIC PNL TOTAL CA: CPT

## 2018-01-05 PROCEDURE — 85027 COMPLETE CBC AUTOMATED: CPT

## 2018-01-05 PROCEDURE — 99239 HOSP IP/OBS DSCHRG MGMT >30: CPT | Performed by: INTERNAL MEDICINE

## 2018-01-05 RX ORDER — LISINOPRIL 5 MG/1
5 TABLET ORAL DAILY
Status: DISCONTINUED | OUTPATIENT
Start: 2018-01-06 | End: 2018-01-05 | Stop reason: HOSPADM

## 2018-01-05 RX ORDER — METOPROLOL SUCCINATE 50 MG/1
50 TABLET, EXTENDED RELEASE ORAL 2 TIMES DAILY
Qty: 60 TABLET | Refills: 0 | Status: ON HOLD | OUTPATIENT
Start: 2018-01-05 | End: 2018-01-24 | Stop reason: HOSPADM

## 2018-01-05 RX ORDER — LISINOPRIL 5 MG/1
5 TABLET ORAL DAILY
Qty: 30 TABLET | Refills: 3 | Status: SHIPPED | OUTPATIENT
Start: 2018-01-06 | End: 2018-03-12 | Stop reason: ALTCHOICE

## 2018-01-05 RX ORDER — METOPROLOL SUCCINATE 100 MG/1
100 TABLET, EXTENDED RELEASE ORAL 2 TIMES DAILY
Qty: 30 TABLET | Refills: 3 | Status: SHIPPED | OUTPATIENT
Start: 2018-01-05 | End: 2018-01-05

## 2018-01-05 RX ORDER — METOPROLOL SUCCINATE 100 MG/1
100 TABLET, EXTENDED RELEASE ORAL 2 TIMES DAILY
Status: DISCONTINUED | OUTPATIENT
Start: 2018-01-05 | End: 2018-01-05 | Stop reason: HOSPADM

## 2018-01-05 RX ADMIN — ASPIRIN 81 MG 81 MG: 81 TABLET ORAL at 08:23

## 2018-01-05 RX ADMIN — APIXABAN 5 MG: 5 TABLET, FILM COATED ORAL at 08:23

## 2018-01-05 RX ADMIN — METOPROLOL SUCCINATE 50 MG: 50 TABLET, EXTENDED RELEASE ORAL at 08:24

## 2018-01-05 RX ADMIN — LISINOPRIL 10 MG: 10 TABLET ORAL at 08:24

## 2018-01-05 RX ADMIN — DIGOXIN 125 MCG: 125 TABLET ORAL at 08:24

## 2018-01-05 RX ADMIN — DOCUSATE SODIUM 100 MG: 100 CAPSULE, LIQUID FILLED ORAL at 08:23

## 2018-01-05 RX ADMIN — Medication 10 ML: at 08:26

## 2018-01-05 ASSESSMENT — PAIN SCALES - GENERAL: PAINLEVEL_OUTOF10: 0

## 2018-01-05 NOTE — PROGRESS NOTES
Patient ambulated in may for long distance. Heart rate did not go above 100. Denies any dyspnea or chest pain. Will inform Dr. Nicholas Del Castillo on rounds. Resting rate came down into the 80's.

## 2018-01-05 NOTE — DISCHARGE SUMMARY
[address] [facility]    Patient name:  Tamiko Morales  YOB: 1960  Primary Care Physician: No primary care provider on file. Date of admission:  1/2/2018  3:35 PM  Date of discharge:     DISCHARGE DIAGNOSES       Principal Problem:    Chest pain  Active Problems:    Atrial fibrillation (HCC)    CHF (congestive heart failure) ([facility] [address]      HOSPITAL COURSE      afib rvr    tsh low from amioderone on dig amioderone stopped toprol increased     lisinopril decreased    Cr nl      consider ablation as out pt     Consultants:  -cardiology     Procedures:  None    DISCHARGE MEDICATIONS        [facility] Medication Instructions MDL:[id number]    Printed on:01/05/18 4869   Medication Information                      apixaban (ELIQUIS) 5 MG TABS tablet  Take 5 mg by mouth 2 times daily. aspirin 81 MG tablet  Take 81 mg by mouth daily. DIGITEK 125 MCG tablet  Take 125 mcg by mouth daily              lisinopril (PRINIVIL;ZESTRIL) 5 MG tablet  Take 1 tablet by mouth daily             metoprolol succinate (TOPROL XL) 100 MG extended release tablet  Take 1 tablet by mouth 2 times daily                 DISPOSITION AND FOLLOW-UP     Disposition: home    Condition: Stable     Diet:  Regular diet     Activity: As tolerated     Follow-up:   with No primary care provider on file.,    Discharge time spent on pt and paperworki more than [address]    Phone [phone]   Fax: [phone]  Answering Service: [phone]

## 2018-01-05 NOTE — PLAN OF CARE
Problem: Pain:  Goal: Pain level will decrease  Pain level will decrease   Outcome: Met This Shift  Pt did not complain of pain during this shift. Problem: Tissue Perfusion - Cardiopulmonary, Altered:  Goal: Absence of angina  Absence of angina   Outcome: Met This Shift  Pt did not complain of chest pain during this shift.

## 2018-01-07 LAB
EKG ATRIAL RATE: 108 BPM
EKG Q-T INTERVAL: 338 MS
EKG QRS DURATION: 86 MS
EKG QTC CALCULATION (BAZETT): 424 MS
EKG R AXIS: -15 DEGREES
EKG T AXIS: 8 DEGREES
EKG VENTRICULAR RATE: 95 BPM

## 2018-01-07 NOTE — PROGRESS NOTES
.Date Patient Discharged:01/05/18      Today's Date:01/07/18      Spoke with:Pt      Do you understand the purpose of your medications?:  N/A    Do you understand the side effects of your new medications?:  N/A    Would you like to speak with a pharmacist about any of your medications or the side effects?:  N/A    Were you able to get your prescriptions filled?:  N/A    Did you understand your discharge instructions and what you are responsible for in managing your health after discharge?:  yes    While you were here, was your call light answered promptly?:  Yes    Was the area around your room kept quiet at night?:  Yes    Were your room and bathroom kept clean?:Yes

## 2018-01-22 ENCOUNTER — TELEPHONE (OUTPATIENT)
Dept: INTERNAL MEDICINE CLINIC | Age: 58
End: 2018-01-22

## 2018-01-22 PROBLEM — R06.02 SOB (SHORTNESS OF BREATH): Status: ACTIVE | Noted: 2018-01-22

## 2018-01-22 PROBLEM — I25.10 CORONARY ATHEROSCLEROSIS: Status: ACTIVE | Noted: 2018-01-22

## 2018-01-22 PROBLEM — E11.9 DIABETES MELLITUS (HCC): Status: ACTIVE | Noted: 2018-01-22

## 2018-01-22 PROBLEM — I10 ESSENTIAL (PRIMARY) HYPERTENSION: Status: ACTIVE | Noted: 2018-01-22

## 2018-01-22 PROBLEM — I25.10 ATHEROSCLEROTIC HEART DISEASE OF NATIVE CORONARY ARTERY WITHOUT ANGINA PECTORIS: Status: ACTIVE | Noted: 2018-01-22

## 2018-01-22 PROBLEM — I25.5 ISCHEMIC CARDIOMYOPATHY: Status: ACTIVE | Noted: 2018-01-22

## 2018-01-23 ENCOUNTER — HOSPITAL ENCOUNTER (OUTPATIENT)
Dept: CARDIAC CATH/INVASIVE PROCEDURES | Age: 58
Setting detail: OBSERVATION
Discharge: HOME | End: 2018-01-24
Attending: INTERNAL MEDICINE | Admitting: INTERNAL MEDICINE
Payer: COMMERCIAL

## 2018-01-23 DIAGNOSIS — I20.0 UNSTABLE ANGINA (HCC): ICD-10-CM

## 2018-01-23 PROCEDURE — C1894 INTRO/SHEATH, NON-LASER: HCPCS

## 2018-01-23 PROCEDURE — 6360000002 HC RX W HCPCS

## 2018-01-23 PROCEDURE — 96361 HYDRATE IV INFUSION ADD-ON: CPT

## 2018-01-23 PROCEDURE — G0378 HOSPITAL OBSERVATION PER HR: HCPCS

## 2018-01-23 PROCEDURE — C1725 CATH, TRANSLUMIN NON-LASER: HCPCS

## 2018-01-23 PROCEDURE — C1769 GUIDE WIRE: HCPCS

## 2018-01-23 PROCEDURE — 6370000000 HC RX 637 (ALT 250 FOR IP): Performed by: INTERNAL MEDICINE

## 2018-01-23 PROCEDURE — 92928 PRQ TCAT PLMT NTRAC ST 1 LES: CPT

## 2018-01-23 PROCEDURE — C1874 STENT, COATED/COV W/DEL SYS: HCPCS

## 2018-01-23 PROCEDURE — 96360 HYDRATION IV INFUSION INIT: CPT

## 2018-01-23 PROCEDURE — 6370000000 HC RX 637 (ALT 250 FOR IP)

## 2018-01-23 PROCEDURE — 2580000003 HC RX 258: Performed by: INTERNAL MEDICINE

## 2018-01-23 PROCEDURE — 2500000003 HC RX 250 WO HCPCS

## 2018-01-23 PROCEDURE — 93458 L HRT ARTERY/VENTRICLE ANGIO: CPT

## 2018-01-23 PROCEDURE — C1887 CATHETER, GUIDING: HCPCS

## 2018-01-23 PROCEDURE — 2709999900 HC NON-CHARGEABLE SUPPLY

## 2018-01-23 RX ORDER — CLOPIDOGREL BISULFATE 75 MG/1
75 TABLET ORAL DAILY
Status: DISCONTINUED | OUTPATIENT
Start: 2018-01-24 | End: 2018-01-24 | Stop reason: HOSPADM

## 2018-01-23 RX ORDER — ASPIRIN 81 MG/1
81 TABLET ORAL DAILY
Status: DISCONTINUED | OUTPATIENT
Start: 2018-01-24 | End: 2018-01-24 | Stop reason: HOSPADM

## 2018-01-23 RX ORDER — ATORVASTATIN CALCIUM 40 MG/1
40 TABLET, FILM COATED ORAL NIGHTLY
Status: DISCONTINUED | OUTPATIENT
Start: 2018-01-23 | End: 2018-01-24 | Stop reason: HOSPADM

## 2018-01-23 RX ORDER — SODIUM CHLORIDE 9 MG/ML
INJECTION, SOLUTION INTRAVENOUS CONTINUOUS
Status: ACTIVE | OUTPATIENT
Start: 2018-01-23 | End: 2018-01-24

## 2018-01-23 RX ORDER — LISINOPRIL 5 MG/1
5 TABLET ORAL DAILY
Status: DISCONTINUED | OUTPATIENT
Start: 2018-01-24 | End: 2018-01-24 | Stop reason: HOSPADM

## 2018-01-23 RX ORDER — METOPROLOL SUCCINATE 50 MG/1
50 TABLET, EXTENDED RELEASE ORAL 2 TIMES DAILY
Status: DISCONTINUED | OUTPATIENT
Start: 2018-01-23 | End: 2018-01-24

## 2018-01-23 RX ORDER — SODIUM CHLORIDE 9 MG/ML
INJECTION, SOLUTION INTRAVENOUS CONTINUOUS
Status: DISCONTINUED | OUTPATIENT
Start: 2018-01-23 | End: 2018-01-23

## 2018-01-23 RX ORDER — DIGOXIN 125 MCG
125 TABLET ORAL DAILY
Status: DISCONTINUED | OUTPATIENT
Start: 2018-01-24 | End: 2018-01-24 | Stop reason: HOSPADM

## 2018-01-23 RX ORDER — ONDANSETRON 2 MG/ML
4 INJECTION INTRAMUSCULAR; INTRAVENOUS EVERY 6 HOURS PRN
Status: DISCONTINUED | OUTPATIENT
Start: 2018-01-23 | End: 2018-01-24 | Stop reason: HOSPADM

## 2018-01-23 RX ORDER — ACETAMINOPHEN 325 MG/1
650 TABLET ORAL EVERY 4 HOURS PRN
Status: DISCONTINUED | OUTPATIENT
Start: 2018-01-23 | End: 2018-01-24 | Stop reason: HOSPADM

## 2018-01-23 RX ADMIN — SODIUM CHLORIDE: 9 INJECTION, SOLUTION INTRAVENOUS at 12:11

## 2018-01-23 RX ADMIN — APIXABAN 5 MG: 5 TABLET, FILM COATED ORAL at 22:14

## 2018-01-23 RX ADMIN — METOPROLOL SUCCINATE 50 MG: 50 TABLET, FILM COATED, EXTENDED RELEASE ORAL at 22:14

## 2018-01-23 RX ADMIN — SODIUM CHLORIDE: 9 INJECTION, SOLUTION INTRAVENOUS at 18:18

## 2018-01-23 RX ADMIN — DESMOPRESSIN ACETATE 40 MG: 0.2 TABLET ORAL at 22:14

## 2018-01-23 ASSESSMENT — PAIN SCALES - GENERAL: PAINLEVEL_OUTOF10: 0

## 2018-01-23 NOTE — BRIEF OP NOTE
Cardiac Catherization       Mr. Markus Fernandez  YOB: 1960   [id number]      Pre-operative Diagnosis: unstable angina    Post-operative Diagnosis: same    Informed consent was obtained from the patient after explanation of the procedure including indications, description of the procedure, benefits and possible risks and complications of the procedure, and alternatives. Questions were answered. The patient's history was reviewed and a directed physical examination was performed prior to the procedure      Procedure: LHC, LVgram, Coronary angiography     LV gram - 50     Cors    LM - 0    LAD - proximal 80% treated with 3.0 X 18 Xience, mid 40%    LCF - 0    RCA - mid 100%, known  of RCA    Ramus - distal 90% treated with 2.75 X12 Xience        Anesthesia: conscious sedation    Surgeons/Assistants: Alma Rosa    Estimated Blood Loss: Minimal    Complications: None       1. Low normal LV function   2. Multivessel CAD   3. Detailed discussion with patient about PCI versus CABG. Patient   preferred PCI and proceeded accordingly   4. Successful NOHEMY of distal Ramus   5.  Successful NOHEMY of proximal LAD        Tejal Mckeon MD, [facility]  1/23/2018 1:56 PM

## 2018-01-23 NOTE — PROGRESS NOTES
Patient admitted, consent signed, all questions answered. Pt ready for procedure. Call light to reach with side rails up 2 of 2.  allen groins clippe wife at bedside with patient.

## 2018-01-24 VITALS
OXYGEN SATURATION: 95 % | HEIGHT: 66 IN | HEART RATE: 95 BPM | DIASTOLIC BLOOD PRESSURE: 70 MMHG | SYSTOLIC BLOOD PRESSURE: 126 MMHG | BODY MASS INDEX: 26.36 KG/M2 | TEMPERATURE: 97.7 F | WEIGHT: 164 LBS | RESPIRATION RATE: 16 BRPM

## 2018-01-24 LAB
ANION GAP SERPL CALCULATED.3IONS-SCNC: 10 MMOL/L (ref 9–17)
BUN BLDV-MCNC: 15 MG/DL (ref 6–20)
BUN/CREAT BLD: ABNORMAL (ref 9–20)
CALCIUM SERPL-MCNC: 10.2 MG/DL (ref 8.6–10.4)
CHLORIDE BLD-SCNC: 100 MMOL/L (ref 98–107)
CO2: 23 MMOL/L (ref 20–31)
CREAT SERPL-MCNC: 0.62 MG/DL (ref 0.7–1.2)
GFR AFRICAN AMERICAN: >60 ML/MIN
GFR NON-AFRICAN AMERICAN: >60 ML/MIN
GFR SERPL CREATININE-BSD FRML MDRD: ABNORMAL ML/MIN/{1.73_M2}
GFR SERPL CREATININE-BSD FRML MDRD: ABNORMAL ML/MIN/{1.73_M2}
GLUCOSE BLD-MCNC: 100 MG/DL (ref 70–99)
HCT VFR BLD CALC: 45.7 % (ref 40.7–50.3)
HEMOGLOBIN: 14.4 G/DL (ref 13–17)
MCH RBC QN AUTO: 30 PG (ref 25.2–33.5)
MCHC RBC AUTO-ENTMCNC: 31.5 G/DL (ref 28.4–34.8)
MCV RBC AUTO: 95.2 FL (ref 82.6–102.9)
NRBC AUTOMATED: 0 PER 100 WBC
PDW BLD-RTO: 11.3 % (ref 11.8–14.4)
PLATELET # BLD: 235 K/UL (ref 138–453)
PMV BLD AUTO: 11.1 FL (ref 8.1–13.5)
POTASSIUM SERPL-SCNC: 4.3 MMOL/L (ref 3.7–5.3)
RBC # BLD: 4.8 M/UL (ref 4.21–5.77)
SODIUM BLD-SCNC: 133 MMOL/L (ref 135–144)
WBC # BLD: 6.5 K/UL (ref 3.5–11.3)

## 2018-01-24 PROCEDURE — G0378 HOSPITAL OBSERVATION PER HR: HCPCS

## 2018-01-24 PROCEDURE — 7100000011 HC PHASE II RECOVERY - ADDTL 15 MIN

## 2018-01-24 PROCEDURE — 80048 BASIC METABOLIC PNL TOTAL CA: CPT

## 2018-01-24 PROCEDURE — 7100000010 HC PHASE II RECOVERY - FIRST 15 MIN

## 2018-01-24 PROCEDURE — 36415 COLL VENOUS BLD VENIPUNCTURE: CPT

## 2018-01-24 PROCEDURE — 85027 COMPLETE CBC AUTOMATED: CPT

## 2018-01-24 PROCEDURE — 6370000000 HC RX 637 (ALT 250 FOR IP): Performed by: INTERNAL MEDICINE

## 2018-01-24 RX ORDER — CLOPIDOGREL BISULFATE 75 MG/1
75 TABLET ORAL DAILY
Qty: 30 TABLET | Refills: 3 | Status: SHIPPED | OUTPATIENT
Start: 2018-01-25 | End: 2020-11-16

## 2018-01-24 RX ORDER — ATORVASTATIN CALCIUM 40 MG/1
40 TABLET, FILM COATED ORAL NIGHTLY
Qty: 30 TABLET | Refills: 3 | Status: SHIPPED | OUTPATIENT
Start: 2018-01-24 | End: 2020-11-16

## 2018-01-24 RX ORDER — METOPROLOL SUCCINATE 25 MG/1
75 TABLET, EXTENDED RELEASE ORAL 2 TIMES DAILY
Qty: 30 TABLET | Refills: 3 | Status: SHIPPED | OUTPATIENT
Start: 2018-01-24 | End: 2020-11-16

## 2018-01-24 RX ORDER — METOPROLOL SUCCINATE 50 MG/1
25 TABLET, EXTENDED RELEASE ORAL ONCE
Status: COMPLETED | OUTPATIENT
Start: 2018-01-24 | End: 2018-01-24

## 2018-01-24 RX ADMIN — METOPROLOL SUCCINATE 25 MG: 25 TABLET, FILM COATED, EXTENDED RELEASE ORAL at 14:39

## 2018-01-24 RX ADMIN — LISINOPRIL 5 MG: 5 TABLET ORAL at 10:37

## 2018-01-24 RX ADMIN — METOPROLOL SUCCINATE 50 MG: 50 TABLET, FILM COATED, EXTENDED RELEASE ORAL at 10:38

## 2018-01-24 RX ADMIN — CLOPIDOGREL 75 MG: 75 TABLET, FILM COATED ORAL at 10:38

## 2018-01-24 RX ADMIN — APIXABAN 5 MG: 5 TABLET, FILM COATED ORAL at 10:37

## 2018-01-24 RX ADMIN — ASPIRIN 81 MG: 81 TABLET, COATED ORAL at 10:37

## 2018-01-24 RX ADMIN — DIGOXIN 125 MCG: 0.12 TABLET ORAL at 10:38

## 2018-01-24 NOTE — PLAN OF CARE
Problem: Pain:  Goal: Control of acute pain  Control of acute pain   Outcome: Ongoing  Pt c/o no pain at this time. Will notify staff of any pain. Will cont to monitor    Problem: Activity Intolerance:  Goal: Ability to tolerate increased activity will improve  Ability to tolerate increased activity will improve   Outcome: Ongoing  Ambulating with no difficult, steady gait. Will cont to monitor    Problem: Skin Integrity:  Goal: Absence of new skin breakdown  Absence of new skin breakdown   Outcome: Ongoing  No s/s new breakdown.

## 2018-01-24 NOTE — FLOWSHEET NOTE
Visited and prayed with patient at his bedside. Family was not present at the time. When asked how he was feeling, patient responded; \"I am feeling better today. \"  offered support and reassured patient that he was in good hands. Patient was raised Islam but not affiliated with any [place]. Patient received sacrament of anointing of the sick. Follow up visits recommended for more spiritual and emotional support. 01/24/18 1017   Encounter Summary   Services provided to: Patient   Support System Family members   Place of [place]ism None   Continue Visiting (01/24/2018)   Complexity of Encounter Low   Length of Encounter 30 minutes   Spiritual Assessment Completed Yes   Routine   Type Initial   Spiritual/Sikh   Type Spiritual support   Assessment Calm; Approachable; Hopeful   Intervention Active listening;Nurtured hope;Prayer; Anointing   Outcome Expressed gratitude   Sacraments   Sacrament of Sick-Anointing Anointed

## 2018-01-24 NOTE — FLOWSHEET NOTE
Patient discharged to home accompanied by family with all belongings. Discharge instructions given, patient verbalized understanding. Patient left unit ambulatory.

## 2018-01-24 NOTE — DISCHARGE INSTR - DIET

## 2018-02-14 ENCOUNTER — HOSPITAL ENCOUNTER (OUTPATIENT)
Dept: CARDIAC REHAB | Age: 58
Setting detail: THERAPIES SERIES
Discharge: HOME | End: 2018-02-14
Payer: COMMERCIAL

## 2018-02-14 VITALS
DIASTOLIC BLOOD PRESSURE: 106 MMHG | HEART RATE: 84 BPM | HEIGHT: 66 IN | WEIGHT: 158.2 LBS | SYSTOLIC BLOOD PRESSURE: 142 MMHG | RESPIRATION RATE: 16 BRPM | OXYGEN SATURATION: 96 % | BODY MASS INDEX: 25.43 KG/M2

## 2018-02-14 PROCEDURE — 93798 PHYS/QHP OP CAR RHAB W/ECG: CPT

## 2018-02-14 ASSESSMENT — PATIENT HEALTH QUESTIONNAIRE - PHQ9: SUM OF ALL RESPONSES TO PHQ QUESTIONS 1-9: 0

## 2018-02-16 ENCOUNTER — HOSPITAL ENCOUNTER (OUTPATIENT)
Dept: CARDIAC REHAB | Age: 58
Setting detail: THERAPIES SERIES
Discharge: HOME | End: 2018-02-16
Payer: COMMERCIAL

## 2018-02-16 VITALS — BODY MASS INDEX: 25.58 KG/M2 | WEIGHT: 158.5 LBS

## 2018-02-16 PROCEDURE — 93798 PHYS/QHP OP CAR RHAB W/ECG: CPT

## 2018-02-19 ENCOUNTER — HOSPITAL ENCOUNTER (OUTPATIENT)
Dept: CARDIAC REHAB | Age: 58
Setting detail: THERAPIES SERIES
Discharge: HOME | End: 2018-02-19
Payer: COMMERCIAL

## 2018-02-19 VITALS — BODY MASS INDEX: 25.63 KG/M2 | WEIGHT: 158.8 LBS

## 2018-02-19 PROCEDURE — 93798 PHYS/QHP OP CAR RHAB W/ECG: CPT

## 2018-02-21 ENCOUNTER — HOSPITAL ENCOUNTER (OUTPATIENT)
Dept: CARDIAC REHAB | Age: 58
Setting detail: THERAPIES SERIES
Discharge: HOME | End: 2018-02-21
Payer: COMMERCIAL

## 2018-02-21 VITALS — WEIGHT: 157.2 LBS | BODY MASS INDEX: 25.37 KG/M2

## 2018-02-21 PROCEDURE — 93798 PHYS/QHP OP CAR RHAB W/ECG: CPT

## 2018-02-23 ENCOUNTER — HOSPITAL ENCOUNTER (OUTPATIENT)
Dept: CARDIAC REHAB | Age: 58
Setting detail: THERAPIES SERIES
Discharge: HOME | End: 2018-02-23
Payer: COMMERCIAL

## 2018-02-23 VITALS — WEIGHT: 156.7 LBS | BODY MASS INDEX: 25.29 KG/M2

## 2018-02-23 PROCEDURE — 93798 PHYS/QHP OP CAR RHAB W/ECG: CPT

## 2018-02-28 ENCOUNTER — HOSPITAL ENCOUNTER (OUTPATIENT)
Dept: CARDIAC REHAB | Age: 58
Setting detail: THERAPIES SERIES
Discharge: HOME | End: 2018-02-28
Payer: COMMERCIAL

## 2018-02-28 VITALS — BODY MASS INDEX: 25.32 KG/M2 | WEIGHT: 156.9 LBS

## 2018-02-28 PROCEDURE — 93798 PHYS/QHP OP CAR RHAB W/ECG: CPT

## 2018-03-02 ENCOUNTER — HOSPITAL ENCOUNTER (OUTPATIENT)
Dept: CARDIAC REHAB | Age: 58
Setting detail: THERAPIES SERIES
Discharge: HOME | End: 2018-03-02
Payer: COMMERCIAL

## 2018-03-02 VITALS — WEIGHT: 157.1 LBS | BODY MASS INDEX: 25.36 KG/M2

## 2018-03-02 PROCEDURE — 93798 PHYS/QHP OP CAR RHAB W/ECG: CPT

## 2018-03-02 RX ORDER — NITROGLYCERIN 0.4 MG/1
0.4 TABLET SUBLINGUAL EVERY 5 MIN PRN
COMMUNITY

## 2018-03-05 ENCOUNTER — HOSPITAL ENCOUNTER (OUTPATIENT)
Dept: CARDIAC REHAB | Age: 58
Setting detail: THERAPIES SERIES
Discharge: HOME | End: 2018-03-05
Payer: COMMERCIAL

## 2018-03-05 VITALS — BODY MASS INDEX: 25.26 KG/M2 | WEIGHT: 156.5 LBS

## 2018-03-05 PROCEDURE — 93798 PHYS/QHP OP CAR RHAB W/ECG: CPT

## 2018-03-07 ENCOUNTER — HOSPITAL ENCOUNTER (OUTPATIENT)
Dept: CARDIAC REHAB | Age: 58
Setting detail: THERAPIES SERIES
Discharge: HOME | End: 2018-03-07
Payer: COMMERCIAL

## 2018-03-07 VITALS — BODY MASS INDEX: 25.82 KG/M2 | WEIGHT: 160 LBS

## 2018-03-07 PROCEDURE — 93798 PHYS/QHP OP CAR RHAB W/ECG: CPT

## 2018-03-12 ENCOUNTER — HOSPITAL ENCOUNTER (OUTPATIENT)
Dept: CARDIAC REHAB | Age: 58
Setting detail: THERAPIES SERIES
Discharge: HOME | End: 2018-03-12
Payer: COMMERCIAL

## 2018-03-12 VITALS — BODY MASS INDEX: 25.26 KG/M2 | WEIGHT: 156.5 LBS

## 2018-03-12 PROCEDURE — 93798 PHYS/QHP OP CAR RHAB W/ECG: CPT

## 2018-03-14 ENCOUNTER — HOSPITAL ENCOUNTER (OUTPATIENT)
Dept: CARDIAC REHAB | Age: 58
Setting detail: THERAPIES SERIES
Discharge: HOME | End: 2018-03-14
Payer: COMMERCIAL

## 2018-03-14 VITALS — BODY MASS INDEX: 25.42 KG/M2 | WEIGHT: 157.5 LBS

## 2018-03-14 PROCEDURE — 93798 PHYS/QHP OP CAR RHAB W/ECG: CPT

## 2018-03-16 ENCOUNTER — HOSPITAL ENCOUNTER (OUTPATIENT)
Dept: CARDIAC REHAB | Age: 58
Setting detail: THERAPIES SERIES
Discharge: HOME | End: 2018-03-16
Payer: COMMERCIAL

## 2018-03-16 VITALS — BODY MASS INDEX: 25.25 KG/M2 | WEIGHT: 157.1 LBS | HEIGHT: 66 IN

## 2018-03-16 PROCEDURE — 93798 PHYS/QHP OP CAR RHAB W/ECG: CPT

## 2018-03-19 ENCOUNTER — HOSPITAL ENCOUNTER (OUTPATIENT)
Dept: CARDIAC REHAB | Age: 58
Setting detail: THERAPIES SERIES
Discharge: HOME | End: 2018-03-19
Payer: COMMERCIAL

## 2018-03-19 VITALS — WEIGHT: 157.3 LBS | BODY MASS INDEX: 25.39 KG/M2

## 2018-03-19 PROCEDURE — 93798 PHYS/QHP OP CAR RHAB W/ECG: CPT

## 2018-03-21 ENCOUNTER — HOSPITAL ENCOUNTER (OUTPATIENT)
Dept: CARDIAC REHAB | Age: 58
Setting detail: THERAPIES SERIES
Discharge: HOME | End: 2018-03-21
Payer: COMMERCIAL

## 2018-03-23 ENCOUNTER — HOSPITAL ENCOUNTER (OUTPATIENT)
Dept: CARDIAC REHAB | Age: 58
Setting detail: THERAPIES SERIES
Discharge: HOME | End: 2018-03-23
Payer: COMMERCIAL

## 2018-03-23 VITALS — WEIGHT: 158.4 LBS | BODY MASS INDEX: 25.57 KG/M2

## 2018-03-23 PROCEDURE — 93798 PHYS/QHP OP CAR RHAB W/ECG: CPT

## 2018-03-26 ENCOUNTER — HOSPITAL ENCOUNTER (OUTPATIENT)
Dept: CARDIAC REHAB | Age: 58
Setting detail: THERAPIES SERIES
Discharge: HOME | End: 2018-03-26
Payer: COMMERCIAL

## 2018-03-26 VITALS — WEIGHT: 157.3 LBS | BODY MASS INDEX: 25.39 KG/M2

## 2018-03-26 PROCEDURE — 93798 PHYS/QHP OP CAR RHAB W/ECG: CPT

## 2018-03-28 ENCOUNTER — HOSPITAL ENCOUNTER (OUTPATIENT)
Dept: CARDIAC REHAB | Age: 58
Setting detail: THERAPIES SERIES
Discharge: HOME | End: 2018-03-28
Payer: COMMERCIAL

## 2018-03-28 VITALS — WEIGHT: 157.3 LBS | BODY MASS INDEX: 25.39 KG/M2

## 2018-03-28 PROCEDURE — 93798 PHYS/QHP OP CAR RHAB W/ECG: CPT

## 2018-03-30 ENCOUNTER — APPOINTMENT (OUTPATIENT)
Dept: CARDIAC REHAB | Age: 58
End: 2018-03-30
Payer: COMMERCIAL

## 2018-04-02 ENCOUNTER — HOSPITAL ENCOUNTER (OUTPATIENT)
Dept: CARDIAC REHAB | Age: 58
Setting detail: THERAPIES SERIES
Discharge: HOME | End: 2018-04-02
Payer: COMMERCIAL

## 2018-04-02 VITALS — WEIGHT: 157.6 LBS | BODY MASS INDEX: 25.44 KG/M2

## 2018-04-02 PROCEDURE — 93798 PHYS/QHP OP CAR RHAB W/ECG: CPT

## 2018-04-04 ENCOUNTER — HOSPITAL ENCOUNTER (OUTPATIENT)
Dept: CARDIAC REHAB | Age: 58
Setting detail: THERAPIES SERIES
Discharge: HOME | End: 2018-04-04
Payer: COMMERCIAL

## 2018-04-04 VITALS — WEIGHT: 158.1 LBS | BODY MASS INDEX: 25.52 KG/M2

## 2018-04-04 PROCEDURE — 93798 PHYS/QHP OP CAR RHAB W/ECG: CPT

## 2018-04-09 ENCOUNTER — HOSPITAL ENCOUNTER (OUTPATIENT)
Dept: CARDIAC REHAB | Age: 58
Setting detail: THERAPIES SERIES
Discharge: HOME | End: 2018-04-09
Payer: COMMERCIAL

## 2018-04-09 VITALS — WEIGHT: 157.7 LBS | BODY MASS INDEX: 25.45 KG/M2

## 2018-04-09 PROCEDURE — 93798 PHYS/QHP OP CAR RHAB W/ECG: CPT

## 2018-04-11 ENCOUNTER — HOSPITAL ENCOUNTER (OUTPATIENT)
Dept: CARDIAC REHAB | Age: 58
Setting detail: THERAPIES SERIES
Discharge: HOME | End: 2018-04-11
Payer: COMMERCIAL

## 2018-04-11 VITALS — BODY MASS INDEX: 25.6 KG/M2 | WEIGHT: 158.6 LBS

## 2018-04-11 PROCEDURE — 93798 PHYS/QHP OP CAR RHAB W/ECG: CPT

## 2018-04-13 ENCOUNTER — HOSPITAL ENCOUNTER (OUTPATIENT)
Dept: CARDIAC REHAB | Age: 58
Setting detail: THERAPIES SERIES
Discharge: HOME | End: 2018-04-13
Payer: COMMERCIAL

## 2018-04-13 VITALS — WEIGHT: 157 LBS | BODY MASS INDEX: 25.34 KG/M2

## 2018-04-13 PROCEDURE — 93798 PHYS/QHP OP CAR RHAB W/ECG: CPT

## 2018-04-16 ENCOUNTER — HOSPITAL ENCOUNTER (OUTPATIENT)
Dept: CARDIAC REHAB | Age: 58
Setting detail: THERAPIES SERIES
Discharge: HOME | End: 2018-04-16
Payer: COMMERCIAL

## 2018-04-16 VITALS — BODY MASS INDEX: 25.41 KG/M2 | WEIGHT: 158.1 LBS | HEIGHT: 66 IN

## 2018-04-16 PROCEDURE — 93798 PHYS/QHP OP CAR RHAB W/ECG: CPT

## 2018-04-18 ENCOUNTER — HOSPITAL ENCOUNTER (OUTPATIENT)
Dept: CARDIAC REHAB | Age: 58
Setting detail: THERAPIES SERIES
Discharge: HOME | End: 2018-04-18
Payer: COMMERCIAL

## 2018-04-18 VITALS — BODY MASS INDEX: 25.71 KG/M2 | WEIGHT: 159.3 LBS

## 2018-04-18 PROCEDURE — 93798 PHYS/QHP OP CAR RHAB W/ECG: CPT

## 2018-04-20 ENCOUNTER — APPOINTMENT (OUTPATIENT)
Dept: CARDIAC REHAB | Age: 58
End: 2018-04-20
Payer: COMMERCIAL

## 2018-04-25 ENCOUNTER — HOSPITAL ENCOUNTER (OUTPATIENT)
Dept: CARDIAC REHAB | Age: 58
Setting detail: THERAPIES SERIES
Discharge: HOME | End: 2018-04-25
Payer: COMMERCIAL

## 2018-04-25 VITALS — BODY MASS INDEX: 25.55 KG/M2 | WEIGHT: 158.3 LBS

## 2018-04-25 PROCEDURE — 93798 PHYS/QHP OP CAR RHAB W/ECG: CPT

## 2018-04-27 ENCOUNTER — APPOINTMENT (OUTPATIENT)
Dept: CARDIAC REHAB | Age: 58
End: 2018-04-27
Payer: COMMERCIAL

## 2018-04-30 ENCOUNTER — APPOINTMENT (OUTPATIENT)
Dept: CARDIAC REHAB | Age: 58
End: 2018-04-30
Payer: COMMERCIAL

## 2018-05-02 ENCOUNTER — HOSPITAL ENCOUNTER (OUTPATIENT)
Dept: CARDIAC REHAB | Age: 58
Setting detail: THERAPIES SERIES
Discharge: HOME | End: 2018-05-02
Payer: COMMERCIAL

## 2018-05-04 ENCOUNTER — APPOINTMENT (OUTPATIENT)
Dept: CARDIAC REHAB | Age: 58
End: 2018-05-04
Payer: COMMERCIAL

## 2018-05-07 ENCOUNTER — HOSPITAL ENCOUNTER (OUTPATIENT)
Dept: CARDIAC REHAB | Age: 58
Setting detail: THERAPIES SERIES
Discharge: HOME | End: 2018-05-07
Payer: COMMERCIAL

## 2018-05-07 VITALS — WEIGHT: 156.3 LBS | BODY MASS INDEX: 25.23 KG/M2

## 2018-05-07 PROCEDURE — 93798 PHYS/QHP OP CAR RHAB W/ECG: CPT

## 2018-05-09 ENCOUNTER — HOSPITAL ENCOUNTER (OUTPATIENT)
Dept: CARDIAC REHAB | Age: 58
Setting detail: THERAPIES SERIES
Discharge: HOME | End: 2018-05-09
Payer: COMMERCIAL

## 2018-05-09 VITALS — BODY MASS INDEX: 25.08 KG/M2 | WEIGHT: 155.4 LBS

## 2018-05-09 PROCEDURE — 93798 PHYS/QHP OP CAR RHAB W/ECG: CPT

## 2018-05-11 ENCOUNTER — HOSPITAL ENCOUNTER (OUTPATIENT)
Dept: CARDIAC REHAB | Age: 58
Setting detail: THERAPIES SERIES
Discharge: HOME | End: 2018-05-11
Payer: COMMERCIAL

## 2018-05-11 VITALS — WEIGHT: 155.2 LBS | BODY MASS INDEX: 25.05 KG/M2

## 2018-05-11 PROCEDURE — 93798 PHYS/QHP OP CAR RHAB W/ECG: CPT

## 2018-05-14 ENCOUNTER — HOSPITAL ENCOUNTER (OUTPATIENT)
Dept: CARDIAC REHAB | Age: 58
Setting detail: THERAPIES SERIES
Discharge: HOME | End: 2018-05-14
Payer: COMMERCIAL

## 2018-05-16 ENCOUNTER — HOSPITAL ENCOUNTER (OUTPATIENT)
Dept: CARDIAC REHAB | Age: 58
Setting detail: THERAPIES SERIES
Discharge: HOME | End: 2018-05-16
Payer: COMMERCIAL

## 2018-05-16 VITALS — WEIGHT: 156.3 LBS | BODY MASS INDEX: 25.23 KG/M2

## 2018-05-16 PROCEDURE — 93798 PHYS/QHP OP CAR RHAB W/ECG: CPT

## 2018-05-18 ENCOUNTER — APPOINTMENT (OUTPATIENT)
Dept: CARDIAC REHAB | Age: 58
End: 2018-05-18
Payer: COMMERCIAL

## 2018-05-21 ENCOUNTER — HOSPITAL ENCOUNTER (OUTPATIENT)
Dept: CARDIAC REHAB | Age: 58
Setting detail: THERAPIES SERIES
Discharge: HOME | End: 2018-05-21
Payer: COMMERCIAL

## 2018-05-21 VITALS — WEIGHT: 155.4 LBS | BODY MASS INDEX: 25.08 KG/M2

## 2018-05-21 PROCEDURE — 93798 PHYS/QHP OP CAR RHAB W/ECG: CPT

## 2018-05-30 ENCOUNTER — HOSPITAL ENCOUNTER (OUTPATIENT)
Dept: CARDIAC REHAB | Age: 58
Setting detail: THERAPIES SERIES
Discharge: HOME | End: 2018-05-30
Payer: COMMERCIAL

## 2018-05-30 VITALS — WEIGHT: 154.8 LBS | BODY MASS INDEX: 24.99 KG/M2

## 2018-05-30 PROCEDURE — 93798 PHYS/QHP OP CAR RHAB W/ECG: CPT

## 2018-06-01 ENCOUNTER — HOSPITAL ENCOUNTER (OUTPATIENT)
Dept: CARDIAC REHAB | Age: 58
Setting detail: THERAPIES SERIES
Discharge: HOME | End: 2018-06-01
Payer: COMMERCIAL

## 2018-06-01 VITALS — BODY MASS INDEX: 25.23 KG/M2 | WEIGHT: 156.3 LBS

## 2018-06-01 PROCEDURE — 93798 PHYS/QHP OP CAR RHAB W/ECG: CPT

## 2018-06-04 ENCOUNTER — HOSPITAL ENCOUNTER (OUTPATIENT)
Dept: CARDIAC REHAB | Age: 58
Setting detail: THERAPIES SERIES
Discharge: HOME | End: 2018-06-04
Payer: COMMERCIAL

## 2018-06-04 VITALS — WEIGHT: 157.3 LBS | BODY MASS INDEX: 25.39 KG/M2

## 2018-06-04 PROCEDURE — 93798 PHYS/QHP OP CAR RHAB W/ECG: CPT

## 2018-06-06 ENCOUNTER — HOSPITAL ENCOUNTER (OUTPATIENT)
Dept: CARDIAC REHAB | Age: 58
Setting detail: THERAPIES SERIES
Discharge: HOME | End: 2018-06-06
Payer: COMMERCIAL

## 2018-06-06 VITALS — WEIGHT: 152.8 LBS | BODY MASS INDEX: 24.66 KG/M2

## 2018-06-06 PROCEDURE — 93798 PHYS/QHP OP CAR RHAB W/ECG: CPT

## 2018-06-11 ENCOUNTER — HOSPITAL ENCOUNTER (OUTPATIENT)
Dept: CARDIAC REHAB | Age: 58
Setting detail: THERAPIES SERIES
Discharge: HOME | End: 2018-06-11
Payer: COMMERCIAL

## 2018-06-11 ENCOUNTER — APPOINTMENT (OUTPATIENT)
Dept: CARDIAC REHAB | Age: 58
End: 2018-06-11
Payer: COMMERCIAL

## 2018-06-11 VITALS — BODY MASS INDEX: 25.32 KG/M2 | WEIGHT: 156.9 LBS

## 2018-06-11 PROCEDURE — 93798 PHYS/QHP OP CAR RHAB W/ECG: CPT

## 2018-06-13 ENCOUNTER — HOSPITAL ENCOUNTER (OUTPATIENT)
Dept: CARDIAC REHAB | Age: 58
Setting detail: THERAPIES SERIES
Discharge: HOME | End: 2018-06-13
Payer: COMMERCIAL

## 2018-06-15 ENCOUNTER — HOSPITAL ENCOUNTER (OUTPATIENT)
Dept: CARDIAC REHAB | Age: 58
Setting detail: THERAPIES SERIES
Discharge: HOME | End: 2018-06-15
Payer: COMMERCIAL

## 2018-06-15 VITALS — WEIGHT: 154.6 LBS | BODY MASS INDEX: 24.85 KG/M2 | HEIGHT: 66 IN

## 2018-06-15 PROCEDURE — 93798 PHYS/QHP OP CAR RHAB W/ECG: CPT

## 2018-06-18 ENCOUNTER — APPOINTMENT (OUTPATIENT)
Dept: CARDIAC REHAB | Age: 58
End: 2018-06-18
Payer: COMMERCIAL

## 2018-06-27 ENCOUNTER — HOSPITAL ENCOUNTER (OUTPATIENT)
Age: 58
Discharge: HOME | End: 2018-06-27

## 2018-07-10 ENCOUNTER — HOSPITAL ENCOUNTER (OUTPATIENT)
Age: 58
Discharge: HOME | End: 2018-07-10

## 2020-11-03 PROBLEM — I25.10 CORONARY ATHEROSCLEROSIS: Status: RESOLVED | Noted: 2018-01-22 | Resolved: 2020-11-03

## 2020-11-16 ENCOUNTER — OFFICE VISIT (OUTPATIENT)
Dept: FAMILY MEDICINE CLINIC | Age: 60
End: 2020-11-16
Payer: COMMERCIAL

## 2020-11-16 VITALS
HEIGHT: 66 IN | WEIGHT: 183 LBS | SYSTOLIC BLOOD PRESSURE: 134 MMHG | HEART RATE: 76 BPM | OXYGEN SATURATION: 95 % | BODY MASS INDEX: 29.41 KG/M2 | TEMPERATURE: 97.3 F | DIASTOLIC BLOOD PRESSURE: 70 MMHG

## 2020-11-16 PROCEDURE — 99204 OFFICE O/P NEW MOD 45 MIN: CPT | Performed by: NURSE PRACTITIONER

## 2020-11-16 PROCEDURE — G8484 FLU IMMUNIZE NO ADMIN: HCPCS | Performed by: NURSE PRACTITIONER

## 2020-11-16 PROCEDURE — G8419 CALC BMI OUT NRM PARAM NOF/U: HCPCS | Performed by: NURSE PRACTITIONER

## 2020-11-16 PROCEDURE — 3017F COLORECTAL CA SCREEN DOC REV: CPT | Performed by: NURSE PRACTITIONER

## 2020-11-16 PROCEDURE — 1036F TOBACCO NON-USER: CPT | Performed by: NURSE PRACTITIONER

## 2020-11-16 PROCEDURE — G8427 DOCREV CUR MEDS BY ELIG CLIN: HCPCS | Performed by: NURSE PRACTITIONER

## 2020-11-16 ASSESSMENT — ENCOUNTER SYMPTOMS
DIARRHEA: 0
COUGH: 0
CHEST TIGHTNESS: 0
NAUSEA: 0
SHORTNESS OF BREATH: 0
ABDOMINAL PAIN: 0
CONSTIPATION: 0

## 2020-11-16 ASSESSMENT — PATIENT HEALTH QUESTIONNAIRE - PHQ9
2. FEELING DOWN, DEPRESSED OR HOPELESS: 0
SUM OF ALL RESPONSES TO PHQ9 QUESTIONS 1 & 2: 0
SUM OF ALL RESPONSES TO PHQ QUESTIONS 1-9: 0
1. LITTLE INTEREST OR PLEASURE IN DOING THINGS: 0

## 2020-11-16 NOTE — PROGRESS NOTES
Patient is present to re-establish care    Patient would like a referral for a hearing test  States he has had trouble hearing for a long time    Patient states he was walking his dog and his dog pulled in  States he has pain in his right hand  States he has trouble closing it and holding things    Patient sees cardio

## 2020-11-16 NOTE — PROGRESS NOTES
2020     Gauri Salter (:  1960) is a 63 y.o. male, here for evaluation of the following medical concerns:    HPI     Follows with cardiology for heart conditions, next visit Thursday    He would like a referral for hearing. He has failed a hearing test at work for a few years. He was walking his dog and the dog pulled chasing a rabbit. He yanked him and the pt fell- he does not know how he landed. He has decreased rom. He can't pick anything up. He did not go to urgent. He had significant swelling after this happened. + bruising initially. This has been two months ago. Diet- not good right now  Exercise- 73218 steps a day. Dentist- apt coming up. Review of Systems   Constitutional: Negative for activity change, appetite change, chills and fever. HENT: Negative for congestion, ear pain and hearing loss. Eyes: Negative for visual disturbance. Respiratory: Negative for cough, chest tightness and shortness of breath. Cardiovascular: Negative for chest pain, palpitations and leg swelling. Gastrointestinal: Negative for abdominal pain, constipation, diarrhea and nausea. Genitourinary: Negative for discharge and frequency. Musculoskeletal: Negative for arthralgias and myalgias. Skin: Negative for rash. Neurological: Negative for dizziness, light-headedness and headaches. Psychiatric/Behavioral: Negative for dysphoric mood and self-injury. Prior to Visit Medications    Medication Sig Taking? Authorizing Provider   nitroGLYCERIN (NITROSTAT) 0.4 MG SL tablet Place 0.4 mg under the tongue every 5 minutes as needed for Chest pain up to max of 3 total doses. If no relief after 1 dose, call 911.  Yes Historical Provider, MD   apixaban (ELIQUIS) 5 MG TABS tablet Take 5 mg by mouth daily Indications: from cardio  Yes Historical Provider, MD        Social History     Tobacco Use    Smoking status: Never Smoker    Smokeless tobacco: Never Used   Substance XR HAND RIGHT (MIN 3 VIEWS); Future    5. Screening, lipid    - Lipid, Fasting; Future    6. Screening for diabetes mellitus    - Hemoglobin A1C; Future    7. Screening for malignant neoplasm of prostate    - Psa screening; Future      No follow-ups on file. An electronic signature was used to authenticate this note.     --MARCI Alfaro - CNP on 11/18/2020 at 8:14 AM

## 2020-11-17 ENCOUNTER — HOSPITAL ENCOUNTER (OUTPATIENT)
Dept: GENERAL RADIOLOGY | Facility: CLINIC | Age: 60
Discharge: HOME | End: 2020-11-19
Payer: COMMERCIAL

## 2020-11-17 ENCOUNTER — HOSPITAL ENCOUNTER (OUTPATIENT)
Age: 60
Setting detail: SPECIMEN
Discharge: HOME | End: 2020-11-17
Payer: COMMERCIAL

## 2020-11-17 ENCOUNTER — HOSPITAL ENCOUNTER (OUTPATIENT)
Facility: CLINIC | Age: 60
Discharge: HOME | End: 2020-11-19
Payer: COMMERCIAL

## 2020-11-17 LAB
ALBUMIN SERPL-MCNC: 4.5 G/DL (ref 3.5–5.2)
ALBUMIN/GLOBULIN RATIO: 1.7 (ref 1–2.5)
ALP BLD-CCNC: 89 U/L (ref 40–129)
ALT SERPL-CCNC: 146 U/L (ref 5–41)
ANION GAP SERPL CALCULATED.3IONS-SCNC: 14 MMOL/L (ref 9–17)
AST SERPL-CCNC: 85 U/L
BILIRUB SERPL-MCNC: 0.21 MG/DL (ref 0.3–1.2)
BUN BLDV-MCNC: 17 MG/DL (ref 8–23)
BUN/CREAT BLD: ABNORMAL (ref 9–20)
CALCIUM SERPL-MCNC: 9.5 MG/DL (ref 8.6–10.4)
CHLORIDE BLD-SCNC: 104 MMOL/L (ref 98–107)
CHOLESTEROL, FASTING: 280 MG/DL
CHOLESTEROL/HDL RATIO: 5.4
CO2: 22 MMOL/L (ref 20–31)
CREAT SERPL-MCNC: 0.6 MG/DL (ref 0.7–1.2)
ESTIMATED AVERAGE GLUCOSE: 123 MG/DL
GFR AFRICAN AMERICAN: >60 ML/MIN
GFR NON-AFRICAN AMERICAN: >60 ML/MIN
GFR SERPL CREATININE-BSD FRML MDRD: ABNORMAL ML/MIN/{1.73_M2}
GFR SERPL CREATININE-BSD FRML MDRD: ABNORMAL ML/MIN/{1.73_M2}
GLUCOSE BLD-MCNC: 100 MG/DL (ref 70–99)
HBA1C MFR BLD: 5.9 % (ref 4–6)
HCT VFR BLD CALC: 43.8 % (ref 40.7–50.3)
HDLC SERPL-MCNC: 52 MG/DL
HEMOGLOBIN: 13.9 G/DL (ref 13–17)
LDL CHOLESTEROL: 169 MG/DL (ref 0–130)
MCH RBC QN AUTO: 31.3 PG (ref 25.2–33.5)
MCHC RBC AUTO-ENTMCNC: 31.7 G/DL (ref 28.4–34.8)
MCV RBC AUTO: 98.6 FL (ref 82.6–102.9)
NRBC AUTOMATED: 0 PER 100 WBC
PDW BLD-RTO: 12.8 % (ref 11.8–14.4)
PLATELET # BLD: 213 K/UL (ref 138–453)
PMV BLD AUTO: 11.7 FL (ref 8.1–13.5)
POTASSIUM SERPL-SCNC: 4.6 MMOL/L (ref 3.7–5.3)
PROSTATE SPECIFIC ANTIGEN: 1.58 UG/L
RBC # BLD: 4.44 M/UL (ref 4.21–5.77)
SODIUM BLD-SCNC: 140 MMOL/L (ref 135–144)
TOTAL PROTEIN: 7.2 G/DL (ref 6.4–8.3)
TRIGLYCERIDE, FASTING: 293 MG/DL
VLDLC SERPL CALC-MCNC: ABNORMAL MG/DL (ref 1–30)
WBC # BLD: 4.3 K/UL (ref 3.5–11.3)

## 2020-11-17 PROCEDURE — 73130 X-RAY EXAM OF HAND: CPT

## 2021-07-12 ENCOUNTER — APPOINTMENT (OUTPATIENT)
Dept: CT IMAGING | Age: 61
End: 2021-07-12
Payer: COMMERCIAL

## 2021-07-12 ENCOUNTER — HOSPITAL ENCOUNTER (EMERGENCY)
Age: 61
Discharge: HOME | End: 2021-07-12
Attending: EMERGENCY MEDICINE
Payer: COMMERCIAL

## 2021-07-12 VITALS
DIASTOLIC BLOOD PRESSURE: 110 MMHG | HEART RATE: 88 BPM | SYSTOLIC BLOOD PRESSURE: 130 MMHG | RESPIRATION RATE: 15 BRPM | OXYGEN SATURATION: 91 %
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
## 2021-07-12 DIAGNOSIS — F10.10 ETOH ABUSE: ICD-10-CM

## 2021-07-12 LAB
-: NORMAL
ABSOLUTE EOS #: 0.15 K/UL (ref 0–0.44)
ABSOLUTE IMMATURE GRANULOCYTE: <0.03 K/UL (ref 0–0.3)
ABSOLUTE LYMPH #: 1.46 K/UL (ref 1.1–3.7)
ABSOLUTE MONO #: 0.5 K/UL (ref 0.1–1.2)
ANION GAP SERPL CALCULATED.3IONS-SCNC: 13 MMOL/L (ref 9–17)
BASOPHILS # BLD: 1 % (ref 0–2)
BASOPHILS ABSOLUTE: 0.06 K/UL (ref 0–0.2)
BUN BLDV-MCNC: 19 MG/DL (ref 8–23)
BUN/CREAT BLD: ABNORMAL (ref 9–20)
CALCIUM SERPL-MCNC: 9.3 MG/DL (ref 8.6–10.4)
CHLORIDE BLD-SCNC: 101 MMOL/L (ref 98–107)
CO2: 26 MMOL/L (ref 20–31)
CREAT SERPL-MCNC: 0.85 MG/DL (ref 0.7–1.2)
DIFFERENTIAL TYPE: ABNORMAL
EOSINOPHILS RELATIVE PERCENT: 2 % (ref 1–4)
ETHANOL PERCENT: 0.31 %
ETHANOL: 308 MG/DL
GFR AFRICAN AMERICAN: >60 ML/MIN
GFR NON-AFRICAN AMERICAN: >60 ML/MIN
GFR SERPL CREATININE-BSD FRML MDRD: ABNORMAL ML/MIN/{1.73_M2}
GFR SERPL CREATININE-BSD FRML MDRD: ABNORMAL ML/MIN/{1.73_M2}
GLUCOSE BLD-MCNC: 120 MG/DL (ref 70–99)
HCT VFR BLD CALC: 49 % (ref 40.7–50.3)
HEMOGLOBIN: 16.2 G/DL (ref 13–17)
IMMATURE GRANULOCYTES: 0 %
LYMPHOCYTES # BLD: 21 % (ref 24–43)
MCH RBC QN AUTO: 31.6 PG (ref 25.2–33.5)
MCHC RBC AUTO-ENTMCNC: 33.1 G/DL (ref 28.4–34.8)
MCV RBC AUTO: 95.7 FL (ref 82.6–102.9)
MONOCYTES # BLD: 7 % (ref 3–12)
NRBC AUTOMATED: 0 PER 100 WBC
PDW BLD-RTO: 12.5 % (ref 11.8–14.4)
PLATELET # BLD: 242 K/UL (ref 138–453)
PLATELET ESTIMATE: ABNORMAL
PMV BLD AUTO: 11.1 FL (ref 8.1–13.5)
POTASSIUM SERPL-SCNC: 3.9 MMOL/L (ref 3.7–5.3)
RBC # BLD: 5.12 M/UL (ref 4.21–5.77)
RBC # BLD: ABNORMAL 10*6/UL
REASON FOR REJECTION: NORMAL
SEG NEUTROPHILS: 69 % (ref 36–65)
SEGMENTED NEUTROPHILS ABSOLUTE COUNT: 4.94 K/UL (ref 1.5–8.1)
SODIUM BLD-SCNC: 140 MMOL/L (ref 135–144)
WBC # BLD: 7.1 K/UL (ref 3.5–11.3)
WBC # BLD: ABNORMAL 10*3/UL
ZZ NTE CLEAN UP: ORDERED TEST: NORMAL
ZZ NTE WITH NAME CLEAN UP: SPECIMEN SOURCE: NORMAL

## 2021-07-12 PROCEDURE — 85025 COMPLETE CBC W/AUTO DIFF WBC: CPT

## 2021-07-12 PROCEDURE — G0480 DRUG TEST DEF 1-7 CLASSES: HCPCS

## 2021-07-12 PROCEDURE — 80048 BASIC METABOLIC PNL TOTAL CA: CPT

## 2021-07-12 PROCEDURE — 70450 CT HEAD/BRAIN W/O DYE: CPT

## 2021-07-12 PROCEDURE — 99283 EMERGENCY DEPT VISIT LOW MDM: CPT

## 2021-07-12 PROCEDURE — 72125 CT NECK SPINE W/O DYE: CPT

## 2021-07-12 PROCEDURE — 93005 ELECTROCARDIOGRAM TRACING: CPT | Performed by: EMERGENCY MEDICINE

## 2021-07-12 ASSESSMENT — ENCOUNTER SYMPTOMS
VOMITING: 0
ABDOMINAL PAIN: 0
BACK PAIN: 0
SHORTNESS OF BREATH: 0
NAUSEA: 0

## 2021-07-12 NOTE — ED PROVIDER NOTES
FACULTY SIGN-OUT  ADDENDUM     Care of this patient was assumed from previous attending physician. The patient's initial evaluation and plan have been discussed with the prior provider who initially evaluated the patient. Attestation  I was available and discussed any additional care issues that arose and coordinated the management plans with the resident(s) caring for the patient during my duty period. Any areas of disagreement with resident's documentation of care or procedures are noted on the chart. I was personally present for the key portions of any/all procedures, during my duty period. I have documented in the chart those procedures where I was not present during the key portions. ED COURSE      The patient was given the following medications:  No orders of the defined types were placed in this encounter. RECENT VITALS:    , Pulse: 88, Resp: 15, BP: (!) 130/110    MEDICAL DECISION MAKING        Aleksandra Bernard is a 57 y.o. male who presents to the Emergency Department with complaints of fall. Hit head. CTs neg. Etoh. Reassess when sober.       Carlitos Clarke MD  Attending Emergency Physician    (Please note that portions of this note were completed with a voice recognition program.  Efforts were made to edit the dictations but occasionally words are mis-transcribed.)          Carlitos Clarke MD  07/12/21 5442

## 2021-07-12 NOTE — ED NOTES
Patient keeps taking off telemetry monitor and pulse oximeter and requesting to leave, wants phone to call 911, walking back and forth to the bathroom however he urinated on himself prior to going     Mayra Beltrán RN  07/12/21 4027

## 2021-07-12 NOTE — ED PROVIDER NOTES
Used   Substance and Sexual Activity    Alcohol use: Yes     Comment: OCCASIONAL    Drug use: No    Sexual activity: Not on file   Other Topics Concern    Not on file   Social History Narrative    Not on file     Social Determinants of Health     Financial Resource Strain:     Difficulty of Paying Living Expenses:    Food Insecurity:     Worried About Running Out of Food in the Last Year:     [address] in the Last Year:    Transportation Needs:     Lack of Transportation (Medical):  Lack of Transportation (Non-Medical):    Physical Activity:     Days of Exercise per Week:     Minutes of Exercise per Session:    Stress:     Feeling of Stress :    Social Connections:     Frequency of Communication with Friends and Family:     Frequency of Social Gatherings with Friends and Family:     Attends Baptism Services:     Active Member of Clubs or Organizations:     Attends Club or Organization Meetings:     Marital Status:    Intimate Partner Violence:     Fear of Current or Ex-Partner:     Emotionally Abused:     Physically Abused:     Sexually Abused:        Family History   Problem Relation Age of Onset    Cancer Mother         breast metastatic    Heart Disease Father         Allergies:  Codeine    Home Medications:  Prior to Admission medications    Medication Sig Start Date End Date Taking? Authorizing Provider   nitroGLYCERIN (NITROSTAT) 0.4 MG SL tablet Place 0.4 mg under the tongue every 5 minutes as needed for Chest pain up to max of 3 total doses. If no relief after 1 dose, call 911. Historical Provider, MD   apixaban (ELIQUIS) 5 MG TABS tablet Take 5 mg by mouth daily Indications: from cardio     Historical Provider, MD       REVIEW OFSYSTEMS    (2-9 systems for level 4, 10 or more for level 5)      Review of Systems   Constitutional: Negative for diaphoresis and fever. HENT: Negative for congestion. Eyes: Negative for visual disturbance.    Respiratory: Negative for shortness of breath. Cardiovascular: Negative for chest pain. Gastrointestinal: Negative for abdominal pain, nausea and vomiting. Endocrine: Negative for polyuria. Genitourinary: Negative for dysuria. Musculoskeletal: Negative for back pain. Skin: Negative for wound. Neurological: Negative for headaches. Psychiatric/Behavioral: Negative for confusion. Intoxicated         PHYSICAL EXAM   (up to 7 for level 4, 8 or more forlevel 5)      ED TRIAGE VITALS BP: (!) 155/105,  , Pulse: 111, Resp: 18, SpO2: 97 %    Vitals:    07/12/21 0209 07/12/21 0231 07/12/21 0232 07/12/21 0236   BP:   (!) 130/110    Pulse: 97  106 88   Resp: 20  15    SpO2: 97% 92%  91%       Physical Exam  Constitutional:       Comments: intoxicated   HENT:      Head: Normocephalic. Right Ear: Tympanic membrane normal.      Left Ear: Tympanic membrane normal.      Nose: Nose normal.      Mouth/Throat:      Mouth: Mucous membranes are moist.   Cardiovascular:      Rate and Rhythm: Normal rate. Pulmonary:      Effort: Pulmonary effort is normal.   Abdominal:      Palpations: Abdomen is soft. Musculoskeletal:         General: Normal range of motion. Cervical back: Normal range of motion. Skin:     General: Skin is warm. Capillary Refill: Capillary refill takes less than 2 seconds. Neurological:      General: No focal deficit present. Mental Status: He is alert. Comments: intoxicated         DIFFERENTIAL  DIAGNOSIS     PLAN (LABS / IMAGING / EKG):  Orders Placed This Encounter   Procedures    CT Head WO Contrast    CT Cervical Spine WO Contrast    ETHANOL    Basic Metabolic Panel w/ Reflex to MG    SPECIMEN REJECTION    CBC Auto Differential    PREVIOUS SPECIMEN       MEDICATIONS ORDERED:  No orders of the defined types were placed in this encounter. DDX:     Traumatic intracranial injuries, alcohol abuse    Initial MDM/Plan: 57 y.o. male who presents with fall, etoh.      Traumatic injuries: CT head neck unremarkable    EtOH: Alcohol level 300, will need to be observed for 7 hours for sober time, sober time at 8 AM.    DIAGNOSTIC RESULTS / EMERGENCYDEPARTMENT COURSE / MDM     LABS:  Results for orders placed or performed during the hospital encounter of 07/12/21   ETHANOL   Result Value Ref Range    Ethanol 308 (HH) <10 mg/dL    Ethanol percent 0.308 (H) <0.010 %   Basic Metabolic Panel w/ Reflex to MG   Result Value Ref Range    Glucose 120 (H) 70 - 99 mg/dL    BUN 19 8 - 23 mg/dL    CREATININE 0.85 0.70 - 1.20 mg/dL    Bun/Cre Ratio NOT REPORTED 9 - 20    Calcium 9.3 8.6 - 10.4 mg/dL    Sodium 140 135 - 144 mmol/L    Potassium 3.9 3.7 - 5.3 mmol/L    Chloride 101 98 - 107 mmol/L    CO2 26 20 - 31 mmol/L    Anion Gap 13 9 - 17 mmol/L    GFR Non-African American >60 >60 mL/min    GFR African American >60 >60 mL/min    GFR Comment          GFR Staging NOT REPORTED    SPECIMEN REJECTION   Result Value Ref Range    Specimen Source . BLOOD     Ordered Test CDP     Reason for Rejection Unable to perform testing: Specimen clotted.      - NOT REPORTED    CBC Auto Differential   Result Value Ref Range    WBC 7.1 3.5 - 11.3 k/uL    RBC 5.12 4.21 - 5.77 m/uL    Hemoglobin 16.2 13.0 - 17.0 g/dL    Hematocrit 49.0 40.7 - 50.3 %    MCV 95.7 82.6 - 102.9 fL    MCH 31.6 25.2 - 33.5 pg    MCHC 33.1 28.4 - 34.8 g/dL    RDW 12.5 11.8 - 14.4 %    Platelets 554 172 - 654 k/uL    MPV 11.1 8.1 - 13.5 fL    NRBC Automated 0.0 0.0 per 100 WBC    Differential Type NOT REPORTED     Seg Neutrophils 69 (H) 36 - 65 %    Lymphocytes 21 (L) 24 - 43 %    Monocytes 7 3 - 12 %    Eosinophils % 2 1 - 4 %    Basophils 1 0 - 2 %    Immature Granulocytes 0 0 %    Segs Absolute 4.94 1.50 - 8.10 k/uL    Absolute Lymph # 1.46 1.10 - 3.70 k/uL    Absolute Mono # 0.50 0.10 - 1.20 k/uL    Absolute Eos # 0.15 0.00 - 0.44 k/uL    Basophils Absolute 0.06 0.00 - 0.20 k/uL    Absolute Immature Granulocyte <0.03 0.00 - 0.30 k/uL    WBC Morphology NOT REPORTED     RBC Morphology NOT REPORTED     Platelet Estimate NOT REPORTED        RADIOLOGY:  CT Head WO Contrast   Final Result   No acute intracranial abnormality. CT Cervical Spine WO Contrast   Final Result   No acute abnormality of the cervical spine. EMERGENCY DEPARTMENT COURSE:  ED Course as of Jul 12 0716 Mon Jul 12, 2021   0353 Ethanol(!!): 308 [PS]   0558 Sober time 8am      [PS]   0865 Negative     CT Head WO Contrast [PS]      ED Course User Index  [PS] Vanessa Da Silva MD          PROCEDURES:  None    CONSULTS:  None    CRITICAL CARE:  Please see attending note    FINAL IMPRESSION      1. Fall, initial encounter    2. ETOH abuse          DISPOSITION / PLAN     DISPOSITION     care sign out to Dr. Krysten Headley:  No follow-up provider specified.     DISCHARGE MEDICATIONS:  New Prescriptions    No medications on file       Vanessa Da Silva MD  Emergency Medicine Resident    (Please note that portions of this note were completed with a voice recognition program.Efforts were made to edit the dictations but occasionally words are mis-transcribed.)       Vanessa Da Silva MD  Resident  07/12/21 2686

## 2021-07-12 NOTE — ED NOTES
Bed: 08  Expected date:   Expected time:   Means of arrival:   Comments:  [address], RN  07/12/21 5103

## 2021-07-13 LAB
EKG ATRIAL RATE: 202 BPM
EKG Q-T INTERVAL: 346 MS
EKG QRS DURATION: 86 MS
EKG QTC CALCULATION (BAZETT): 425 MS
EKG R AXIS: -36 DEGREES
EKG T AXIS: 64 DEGREES
EKG VENTRICULAR RATE: 91 BPM

## 2021-07-14 NOTE — ED PROVIDER NOTES
[facility] ED  Emergency Department  Faculty Attestation       I performed a history and physical examination of the patient and discussed management with the resident. I reviewed the residents note and agree with the documented findings including all diagnostic interpretations and plan of care. Any areas of disagreement are noted on the chart. I was personally present for the key portions of any procedures. I have documented in the chart those procedures where I was not present during the key portions. I have reviewed the emergency nurses triage note. I agree with the chief complaint, past medical history, past surgical history, allergies, medications, social and family history as documented unless otherwise noted below. Documentation of the HPI, Physical Exam and Medical Decision Making performed by scribemilia is based on my personal performance of the HPI, PE and MDM. For Physician Assistant/ Nurse Practitioner cases/documentation I have personally evaluated this patient and have completed at least one if not all key elements of the E/M (history, physical exam, and MDM). Additional findings are as noted. Pertinent Comments     Primary Care Physician: MARCI Langston - CNP    History: This is a 57 y.o. male who presents to the Emergency Department with complaint of fall with + LOC. On blood thinners and intoxicated. Patient is continuining to argue with me that he does not want to be here and is verbally escalating. He will not answer any of my questions stating that it is stupid that just because he is intoxicated and he feel that he has to be brought in when he was just trying to mind his own business     Physical:    ED Triage Vitals   BP Temp Temp src Pulse Resp SpO2 Height Weight   07/12/21 0206 -- -- 07/12/21 0206 07/12/21 0206 07/12/21 0209 -- --   (!) 155/105   111 18 97 %          General: Alert, well appearing, and in no distress.   Patient does appear to be intoxicated. HENT: normocephalic/atraumatic. No fairchild sign or raccoon eyes. Nasal septum midline without deviation and no nasal septal hematoma. No active epistaxis or rhinorrhea. Bilateral tympanic membrane with no hemotympanum. No otorhea. No difficulty with movement of the jaw. No intraoral lacerations. Eyes: Pupils equal and reactive to light, extraocular eye movements intact   Neck: Trachea midline. No midline tenderness, step-offs, or deformities. In c-collar  Heart:  Normal rate and regular rhythm, no murmurs noted, no gallops  Chest: Clear to auscultation, no wheezes, rales or rhonchi, symmetric air entry, no chest wall tenderness or crepitus . Abdomen: Soft, nontender, nondistended, with no rebound or guarding. Back: No midline tenderness, step-offs, or deformities of the thoracic and lumbar spine. Extremities: No obvious deformities, normal ROM of all 4 extremities. No edema of bilateral lower extremities  Neurological: Alert & oriented x4. Slurred speech. Normal coordination with no focal deficits. GCS: Eyes 4 - Opens eyes on own; Verbal 5 - Alert and oriented; Movement 6 - Follows simple motor commands - GCS Total 15  Skin: normal coloration and turgor, no rashes on exposed skin     MDM/Plan:   Patient is clearly intoxicated and hit his head w/ h/o being on anticoagulation with a witness LOC. Patient does not have any obvious deformities, however given his intoxicated will get CT head and C-spine. Patient just keeps having the same already with me over and over again. He does not need clinical sobriety this time. Therefore ask alcohol level.   Patient refusing to allow us to get temperature at this time    MIPS 415 - CT Head in Adult Head Trauma > 25years of age:   A head CT was ordered, by someone other than the emergency care provider: No    A head CT was ordered by an emergency care provider, and some of the indications for ordering the head CT included:   Measure Exclusions:  -

## 2021-09-15 ENCOUNTER — HOSPITAL ENCOUNTER (EMERGENCY)
Age: 61
Discharge: HOME | End: 2021-09-15
Attending: EMERGENCY MEDICINE
Payer: COMMERCIAL

## 2021-09-15 VITALS
WEIGHT: 170 LBS | OXYGEN SATURATION: 96 % | BODY MASS INDEX: 27.32 KG/M2 | HEIGHT: 66 IN | SYSTOLIC BLOOD PRESSURE: 122 MMHG | TEMPERATURE: 96.4 F | DIASTOLIC BLOOD PRESSURE: 75 MMHG | RESPIRATION RATE: 16 BRPM | HEART RATE: 100 BPM

## 2021-09-15 DIAGNOSIS — Z20.822 SUSPECTED COVID-19 VIRUS INFECTION: Primary | ICD-10-CM

## 2021-09-15 PROCEDURE — 99283 EMERGENCY DEPT VISIT LOW MDM: CPT

## 2021-09-15 PROCEDURE — U0003 INFECTIOUS AGENT DETECTION BY NUCLEIC ACID (DNA OR RNA); SEVERE ACUTE RESPIRATORY SYNDROME CORONAVIRUS 2 (SARS-COV-2) (CORONAVIRUS DISEASE [COVID-19]), AMPLIFIED PROBE TECHNIQUE, MAKING USE OF HIGH THROUGHPUT TECHNOLOGIES AS DESCRIBED BY CMS-2020-01-R: HCPCS

## 2021-09-15 PROCEDURE — U0005 INFEC AGEN DETEC AMPLI PROBE: HCPCS

## 2021-09-15 RX ORDER — ATORVASTATIN CALCIUM 20 MG/1
20 TABLET, FILM COATED ORAL DAILY
Status: ON HOLD | COMMUNITY
Start: 2021-09-13 | End: 2022-02-26

## 2021-09-15 RX ORDER — METOPROLOL SUCCINATE 50 MG/1
50 TABLET, EXTENDED RELEASE ORAL DAILY
Status: ON HOLD | COMMUNITY
Start: 2021-09-13 | End: 2022-03-01 | Stop reason: HOSPADM

## 2021-09-15 RX ORDER — ASPIRIN 81 MG/1
81 TABLET ORAL DAILY
Status: ON HOLD | COMMUNITY
End: 2022-02-26

## 2021-09-15 ASSESSMENT — ENCOUNTER SYMPTOMS
VOMITING: 0
COUGH: 1
SHORTNESS OF BREATH: 0
SORE THROAT: 1
NAUSEA: 0

## 2021-09-15 NOTE — ED PROVIDER NOTES
[facility]  EMERGENCY DEPARTMENT ENCOUNTER      PtName: Vianey Norris  MRN: [id number]  [place] 1960  Date of evaluation: 9/15/21      CHIEF COMPLAINT       Chief Complaint   Patient presents with    Concern For COVID-19         HISTORY OF PRESENT ILLNESS (4 or more for level 4 or 5)    Vianey Norris is a 57 y.o. male who presents with fatigue. The patient began complaining some fatigue that began last Wednesday with fever that started on Monday. He already has complaint of a cough is nonproductive and a sore throat. He denies any chest pain denies any nausea vomiting. He does complain of a headache and myalgia. The patient was at work and was told he needed to get a Covid test.    This patient was evaluated in the Emergency Department for symptoms described in the history of present illness. This patient was evaluated in the context of the global COVID-19 pandemic, which necessitated consideration that the patient might be at risk for infection with the SARS-CoV-2 virus that causes COVID-19. Institutional protocols and algorithms that pertain to the evaluation of patients at risk for COVID-19 are in a state of rapid change based on information released by regulatory bodies including the CDC and federal and state organizations. These policies and algorithms were followed during the patient's care in the ED. REVIEW OF SYSTEMS  (2-9 for Level 4, 10 or more Level 5)     Review of Systems   Constitutional: Positive for diaphoresis, fatigue and fever. HENT: Positive for sore throat. Eyes: Negative for visual disturbance. Respiratory: Positive for cough. Negative for shortness of breath. Cardiovascular: Negative for chest pain. Gastrointestinal: Negative for nausea and vomiting. Genitourinary: Negative for dysuria. Musculoskeletal: Positive for myalgias. Neurological: Positive for headaches.        PAST MEDICAL HISTORY PAST SURGICAL HISTORY (1 for Level 4, 2 for Level 5)    has a past medical history of Atrial fibrillation ([facility] [address] CAD (coronary artery disease), CHF (congestive heart failure) ([facility] [address] Hyperlipidemia, Hypertension, and S/P cardiac cath. has a past surgical history that includes Vasectomy ([id number]); Tonsillectomy; hernia repair; Coronary angioplasty with stent (2018); and ablation of dysrhythmic focus. CURRENT MEDICATIONS       Previous Medications    APIXABAN (ELIQUIS) 5 MG TABS TABLET    Take 5 mg by mouth daily Indications: from cardio     ASPIRIN 81 MG EC TABLET    Take 81 mg by mouth daily    ATORVASTATIN (LIPITOR) 20 MG TABLET    Take 20 mg by mouth daily    METOPROLOL SUCCINATE (TOPROL XL) 50 MG EXTENDED RELEASE TABLET    Take 50 mg by mouth daily    NITROGLYCERIN (NITROSTAT) 0.4 MG SL TABLET    Place 0.4 mg under the tongue every 5 minutes as needed for Chest pain up to max of 3 total doses. If no relief after 1 dose, call 911. ALLERGIES     is allergic to codeine. FAMILY HISTORY     He indicated that his mother is . He indicated that his father is . family history includes Cancer in his mother; Heart Disease in his father. SOCIAL HISTORY      reports that he has never smoked. He has never used smokeless tobacco. He reports current alcohol use. He reports that he does not use drugs. PHYSICAL EXAM  (5-7 for level 4, 8 or more level 5)   INITIAL VITALS:  height is 5' 6\" (1.676 m) and weight is 170 lb (77.1 kg). His temperature is 96.4 °F (35.8 °C). His blood pressure is 122/75 and his pulse is 100. His respiration is 16 and oxygen saturation is 96%. Physical Exam  Vitals reviewed. HENT:      Head: Atraumatic. Eyes:      General:         Left eye: No discharge. Cardiovascular:      Rate and Rhythm: Normal rate. Rhythm irregular. Pulmonary:      Effort: Pulmonary effort is normal.      Breath sounds: Normal breath sounds. Abdominal:      Palpations: Abdomen is soft.    Skin:     General: Skin is warm. Capillary Refill: Capillary refill takes less than 2 seconds. Neurological:      Mental Status: He is alert. DIFFERENTIAL DIAGNOSIS/ MDM:         DIAGNOSTIC RESULTS           EMERGENCY DEPARTMENTCOURSE:   Vitals:    Vitals:    09/15/21 0950   BP: 122/75   Pulse: 100   Resp: 16   Temp: 96.4 °F (35.8 °C)   SpO2: 96%   Weight: 170 lb (77.1 kg)   Height: 5' 6\" (1.676 m)     -------------------------  BP: 122/75, Temp: 96.4 °F (35.8 °C), Pulse: 100, Resp: 16                 FINALIMPRESSION      1. Suspected COVID-19 virus infection          DISPOSITION/PLAN   DISPOSITION    Discharged home    PATIENT REFERRED TO:  Susu Ramirez, APRN - CNP  [address] [facility]  [phone]    Call in 1 day  For reevaluation        (Please note that portions of this note were completed with a voice recognitionprogram.  Efforts were made to edit the dictations but occasionally words are mis-transcribed.)    Mounika Moody MD F.A.C.E.P.   Attending Emergency Physician                   Mounika Moody MD  09/15/21 7898

## 2021-09-15 NOTE — ED NOTES
Regular Covid 19 swab taken from right nare, labeled, placed in red dot bag, and handed off to second healthcare worker outside of room for transport to laboratory per hospital policy and procedure. Patient tolerated procedure well.        Neida Davis RN  09/15/21 114

## 2021-09-16 ENCOUNTER — CARE COORDINATION (OUTPATIENT)
Dept: CARE COORDINATION | Age: 61
End: 2021-09-16

## 2021-09-16 LAB
SARS-COV-2: ABNORMAL
SARS-COV-2: DETECTED
SOURCE: ABNORMAL

## 2021-09-16 NOTE — CARE COORDINATION
Called, message left on voicemail with my contact information and reason for call. If no return call will reach out 9/17/21 for 2nd attempt    Risk Score: 6    Sx: Fatigue, Fever, Sweating, cough, sore throat, headaches and Myalgias.

## 2021-09-17 ENCOUNTER — PARAMEDICINE (OUTPATIENT)
Dept: EMERGENCY DEPT | Age: 61
End: 2021-09-17

## 2021-09-17 ENCOUNTER — CARE COORDINATION (OUTPATIENT)
Dept: CARE COORDINATION | Age: 61
End: 2021-09-17

## 2021-09-17 NOTE — CARE COORDINATION
Called, message left on voicemail with my contact information and reason for call.  If no return call today, will not plan any future out reach

## 2021-09-17 NOTE — PROGRESS NOTES
Attempted to contact patient regarding positive COVID test, patient did not answer phone, voicemail was left with instructions to call back to get results

## 2021-09-20 ENCOUNTER — HOSPITAL ENCOUNTER (EMERGENCY)
Age: 61
Discharge: HOME | End: 2021-09-20
Attending: EMERGENCY MEDICINE
Payer: COMMERCIAL

## 2021-09-20 VITALS
HEIGHT: 66 IN | RESPIRATION RATE: 18 BRPM | TEMPERATURE: 98.2 F | BODY MASS INDEX: 26.52 KG/M2 | DIASTOLIC BLOOD PRESSURE: 90 MMHG | WEIGHT: 165 LBS | SYSTOLIC BLOOD PRESSURE: 136 MMHG | HEART RATE: 113 BPM | OXYGEN SATURATION: 95 %

## 2021-09-20 DIAGNOSIS — Z20.822 COVID-19 VIRUS TEST RESULT UNKNOWN: Primary | ICD-10-CM

## 2021-09-20 PROCEDURE — U0003 INFECTIOUS AGENT DETECTION BY NUCLEIC ACID (DNA OR RNA); SEVERE ACUTE RESPIRATORY SYNDROME CORONAVIRUS 2 (SARS-COV-2) (CORONAVIRUS DISEASE [COVID-19]), AMPLIFIED PROBE TECHNIQUE, MAKING USE OF HIGH THROUGHPUT TECHNOLOGIES AS DESCRIBED BY CMS-2020-01-R: HCPCS

## 2021-09-20 PROCEDURE — U0005 INFEC AGEN DETEC AMPLI PROBE: HCPCS

## 2021-09-20 PROCEDURE — 99284 EMERGENCY DEPT VISIT MOD MDM: CPT

## 2021-09-20 ASSESSMENT — ENCOUNTER SYMPTOMS
VOMITING: 0
ABDOMINAL DISTENTION: 0
NAUSEA: 0
SHORTNESS OF BREATH: 0
COUGH: 0

## 2021-09-20 NOTE — ED PROVIDER NOTES
[address] [facility]  ED  Emergency Department Encounter  EmergencyMedicine Resident     Pt Name:Miko Parikh  MRN: [id number]  [place] 1960  Date of evaluation: 9/20/21  PCP:  MARCI Strong - CNP    CHIEF COMPLAINT       Chief Complaint   Patient presents with    Covid Testing     wants retested, was positive a week ago       HISTORY OF PRESENT ILLNESS  (Location/Symptom, Timing/Onset, Context/Setting, Quality, Duration, Modifying Factors, Severity.)      Gauri Salter is a 57 y.o. male who presents with need for Covid swab. Patient states symptoms started on 9/8/2021, tested positive on 915. Has been asymptomatic for a couple days. He is asking for a Covid swab to return to work. He has no complaints. Does not wish to be evaluated for anything else. PAST MEDICAL / SURGICAL / SOCIAL / FAMILY HISTORY      has a past medical history of Atrial fibrillation ([facility] [address] [address] CAD (coronary artery disease), CHF (congestive heart failure) ([facility] [address] Hyperlipidemia, Hypertension, and S/P cardiac cath. has a past surgical history that includes Vasectomy ([id number]); Tonsillectomy; hernia repair; Coronary angioplasty with stent (01/23/2018); and ablation of dysrhythmic focus.       Social History     Socioeconomic History    Marital status: Single     Spouse name: Not on file    Number of children: Not on file    Years of education: Not on file    Highest education level: Not on file   Occupational History    Not on file   Tobacco Use    Smoking status: Never Smoker    Smokeless tobacco: Never Used   Substance and Sexual Activity    Alcohol use: Yes     Comment: OCCASIONAL    Drug use: No    Sexual activity: Not on file   Other Topics Concern    Not on file   Social History Narrative    Not on file     Social Determinants of Health     Financial Resource Strain:     Difficulty of Paying Living Expenses:    Food Insecurity:     Worried About Running Out of Food in the Last Year:  Ran Out of Food in the Last Year:    Transportation Needs:     Lack of Transportation (Medical):  Lack of Transportation (Non-Medical):    Physical Activity:     Days of Exercise per Week:     Minutes of Exercise per Session:    Stress:     Feeling of Stress :    Social Connections:     Frequency of Communication with Friends and Family:     Frequency of Social Gatherings with Friends and Family:     Attends [employer] Services:     Active Member of Clubs or Organizations:     Attends Club or Organization Meetings:     Marital Status:    Intimate Partner Violence:     Fear of Current or Ex-Partner:     Emotionally Abused:     Physically Abused:     Sexually Abused:        Family History   Problem Relation Age of Onset    Cancer Mother         breast metastatic    Heart Disease Father        Allergies:  Codeine    Home Medications:  Prior to Admission medications    Medication Sig Start Date End Date Taking? Authorizing Provider   metoprolol succinate (TOPROL XL) 50 MG extended release tablet Take 50 mg by mouth daily 9/13/21   Historical Provider, MD   atorvastatin (LIPITOR) 20 MG tablet Take 20 mg by mouth daily 9/13/21   Historical Provider, MD   aspirin 81 MG EC tablet Take 81 mg by mouth daily    Historical Provider, MD   nitroGLYCERIN (NITROSTAT) 0.4 MG SL tablet Place 0.4 mg under the tongue every 5 minutes as needed for Chest pain up to max of 3 total doses. If no relief after 1 dose, call 911. Historical Provider, MD   apixaban (ELIQUIS) 5 MG TABS tablet Take 5 mg by mouth daily Indications: from cardio     Historical Provider, MD       REVIEW OF SYSTEMS    (2-9 systems for level 4, 10 or more for level 5)      Review of Systems   Constitutional: Negative for chills and fever. Respiratory: Negative for cough and shortness of breath. Cardiovascular: Negative for chest pain. Gastrointestinal: Negative for abdominal distention, nausea and vomiting.         PHYSICAL EXAM   (up to 7 for level 4, 8 or more for level 5)      INITIAL VITALS:   BP (!) 136/90   Pulse 113   Temp 98.2 °F (36.8 °C) (Oral)   Resp 18   Ht 5' 6\" (1.676 m)   Wt 165 lb (74.8 kg)   SpO2 95%   BMI 26.63 kg/m²      Vitals:    09/20/21 1134   BP: (!) 136/90   Pulse: 113   Resp: 18   Temp: 98.2 °F (36.8 °C)   TempSrc: Oral   SpO2: 95%   Weight: 165 lb (74.8 kg)   Height: 5' 6\" (1.676 m)        Physical Exam  Vitals reviewed. Constitutional:       General: He is not in acute distress. Appearance: He is well-developed. He is not ill-appearing. HENT:      Head: Atraumatic. Pulmonary:      Effort: Pulmonary effort is normal. No respiratory distress. Abdominal:      General: There is no distension. Palpations: Abdomen is soft. Tenderness: There is no abdominal tenderness. Skin:     General: Skin is warm and dry. Neurological:      Mental Status: He is alert. DIFFERENTIAL  DIAGNOSIS     PLAN (LABS / IMAGING / EKG):  Orders Placed This Encounter   Procedures    COVID-19       MEDICATIONS ORDERED:  No orders of the defined types were placed in this encounter. DIAGNOSTIC RESULTS / EMERGENCY DEPARTMENT COURSE / MDM   LAB RESULTS:  No results found for this visit on 09/20/21. RADIOLOGY:  No orders to display        [place]:    Patient here for Covid swab this week return to work. Does not wish to be evaluated for any other complaints. Vital signs do show slight tachycardia however patient did just walk into the triage room. Patient instructed to follow-up with [facility] for results. Return the emergency room if he wishes to be evaluated for any complaints.            PROCEDURES:      CONSULTS:  None    CRITICAL CARE:  Please see attending note    FINAL IMPRESSION      1. COVID-19 virus test result unknown        DISPOSITION / PLAN     DISPOSITION Decision To Discharge 09/20/2021 11:32:55 AM      PATIENT REFERRED TO:  No follow-up provider specified.     DISCHARGE MEDICATIONS:  Discharge Medication List as of 9/20/2021 11:50 AM          Tanner Wellington DO  Emergency Medicine Resident    (Please note that portions of thisnote were completed with a voice recognition program.  Efforts were made to edit the dictations but occasionally words are mis-transcribed.)        Tanner Wellington DO  Resident  09/20/21 9436

## 2021-09-20 NOTE — ED PROVIDER NOTES
[facility]     Emergency Department     Faculty Attestation    I performed a history and physical examination of the patient and discussed management with the resident. I have reviewed and agree with the residents findings including all diagnostic interpretations, and treatment plans as written. Any areas of disagreement are noted on the chart. I was personally present for the key portions of any procedures. I have documented in the chart those procedures where I was not present during the key portions. I have reviewed the emergency nurses triage note. I agree with the chief complaint, past medical history, past surgical history, allergies, medications, social and family history as documented unless otherwise noted below. Documentation of the HPI, Physical Exam and Medical Decision Making performed by john is based on my personal performance of the HPI, PE and MDM. For Physician Assistant/ Nurse Practitioner cases/documentation I have personally evaluated this patient and have completed at least one if not all key elements of the E/M (history, physical exam, and MDM). Additional findings are as noted. Requesting covid test for return to work, patient with symptoms starting 9/8, and then tested positive on 9/15. Work wants negative testing before return to work. His only symptoms were fatigue, and he is asymptomatic at this time,. Patient well appearing, no distress  Will plan for covid pcr. And discharge.     Stephen Torre D.O, M.P.H  Attending Emergency Medicine Physician         Stephen Torre, DO  09/20/21 1134

## 2021-09-21 ENCOUNTER — CARE COORDINATION (OUTPATIENT)
Dept: CARE COORDINATION | Age: 61
End: 2021-09-21

## 2021-09-21 LAB
SARS-COV-2: ABNORMAL
SARS-COV-2: DETECTED
SOURCE: ABNORMAL

## 2021-09-21 NOTE — CARE COORDINATION
Patient contacted regarding COVID-19 risk, exposure, pulse oximeter ordered at discharge and monoclonal antibody infusion follow up. Discussed COVID-19 related testing which was available at this time. Test results were positive. Patient informed of results, if available? Yes. Ambulatory Care Manager contacted the patient by telephone to perform post discharge assessment. Call within 2 business days of discharge: Yes. Verified name and  with patient as identifiers. Provided introduction to self, and explanation of the CTN/ACM role, and reason for call due to risk factors for infection and/or exposure to COVID-19. Symptoms reviewed with patient who verbalized the following symptoms: fatigue, sweating and no worsening symptoms. Due to no new or worsening symptoms encounter was not routed to provider for escalation. Discussed follow-up appointments. If no appointment was previously scheduled, appointment scheduling offered: Yes. [facility] follow up appointment(s): No future appointments. Non-[facility] follow up appointment(s): NA    Non-face-to-face services provided:  Education of patient/family/caregiver/guardian to support self-management-symptom monitoring and treatment     Advance Care Planning:   Does patient have an Advance Directive:  reviewed and current. Educated patient about risk for severe COVID-19 due to risk factors according to CDC guidelines. ACM reviewed discharge instructions, medical action plan and red flag symptoms with the patient who verbalized understanding. Discussed COVID vaccination status: Yes. Education provided on COVID-19 vaccination as appropriate. Discussed exposure protocols and quarantine with CDC Guidelines. Patient was given an opportunity to verbalize any questions and concerns and agrees to contact ACM or health care provider for questions related to their healthcare.     Reviewed and educated patient on any new and changed medications related to discharge diagnosis Was patient discharged with a pulse oximeter? No Discussed and confirmed pulse oximeter discharge instructions and when to notify provider or seek emergency care. ACM provided contact information. No further follow-up call identified based on severity of symptoms and risk factors. Diagnosed with COVID 9/15/21, symptoms began 9/8/21. Retested in ED yesterday, result positive. Informed him tests can remain positive for up to 90 days but that has been 13 days so is not considered contagious any more. Only symptom he has is fatigue and last night sweating. He is concerned his employer may not let him return to work, encouraged he ask employer and notify PCP to ask if can get a work note allowing him to return to work. l

## 2021-09-21 NOTE — ACP (ADVANCE CARE PLANNING)
Advance Care Planning   Healthcare Decision Maker:    Primary Decision Maker: Seth Rocha - [phone]    Secondary Decision Maker: Sarah Chow - [phone]    Click here to complete Healthcare Decision Makers including selection of the Healthcare Decision Maker Relationship (ie \"Primary\"). Today we documented Decision Maker(s) consistent with Legal Next of Kin hierarchy.

## 2021-10-04 ENCOUNTER — OFFICE VISIT (OUTPATIENT)
Dept: FAMILY MEDICINE CLINIC | Age: 61
End: 2021-10-04
Payer: COMMERCIAL

## 2021-10-04 VITALS
WEIGHT: 175 LBS | HEART RATE: 100 BPM | SYSTOLIC BLOOD PRESSURE: 100 MMHG | BODY MASS INDEX: 28.25 KG/M2 | OXYGEN SATURATION: 96 % | DIASTOLIC BLOOD PRESSURE: 70 MMHG

## 2021-10-04 DIAGNOSIS — E13.69 OTHER SPECIFIED DIABETES MELLITUS WITH OTHER SPECIFIED COMPLICATION, WITHOUT LONG-TERM CURRENT USE OF INSULIN (HCC): ICD-10-CM

## 2021-10-04 DIAGNOSIS — U07.1 COVID-19: Primary | ICD-10-CM

## 2021-10-04 LAB — HBA1C MFR BLD: 5.8 %

## 2021-10-04 PROCEDURE — 99213 OFFICE O/P EST LOW 20 MIN: CPT | Performed by: NURSE PRACTITIONER

## 2021-10-04 PROCEDURE — 1036F TOBACCO NON-USER: CPT | Performed by: NURSE PRACTITIONER

## 2021-10-04 PROCEDURE — 2022F DILAT RTA XM EVC RTNOPTHY: CPT | Performed by: NURSE PRACTITIONER

## 2021-10-04 PROCEDURE — 83036 HEMOGLOBIN GLYCOSYLATED A1C: CPT | Performed by: NURSE PRACTITIONER

## 2021-10-04 PROCEDURE — G8419 CALC BMI OUT NRM PARAM NOF/U: HCPCS | Performed by: NURSE PRACTITIONER

## 2021-10-04 PROCEDURE — 3044F HG A1C LEVEL LT 7.0%: CPT | Performed by: NURSE PRACTITIONER

## 2021-10-04 PROCEDURE — 3017F COLORECTAL CA SCREEN DOC REV: CPT | Performed by: NURSE PRACTITIONER

## 2021-10-04 PROCEDURE — G8484 FLU IMMUNIZE NO ADMIN: HCPCS | Performed by: NURSE PRACTITIONER

## 2021-10-04 PROCEDURE — G8427 DOCREV CUR MEDS BY ELIG CLIN: HCPCS | Performed by: NURSE PRACTITIONER

## 2021-10-04 RX ORDER — ISOSORBIDE MONONITRATE 30 MG/1
30 TABLET, EXTENDED RELEASE ORAL DAILY
Status: ON HOLD | COMMUNITY
Start: 2021-09-13 | End: 2022-02-26

## 2021-10-04 SDOH — ECONOMIC STABILITY: FOOD INSECURITY: WITHIN THE PAST 12 MONTHS, THE FOOD YOU BOUGHT JUST DIDN'T LAST AND YOU DIDN'T HAVE MONEY TO GET MORE.: NEVER TRUE

## 2021-10-04 SDOH — ECONOMIC STABILITY: FOOD INSECURITY: WITHIN THE PAST 12 MONTHS, YOU WORRIED THAT YOUR FOOD WOULD RUN OUT BEFORE YOU GOT MONEY TO BUY MORE.: NEVER TRUE

## 2021-10-04 ASSESSMENT — PATIENT HEALTH QUESTIONNAIRE - PHQ9
SUM OF ALL RESPONSES TO PHQ QUESTIONS 1-9: 0
2. FEELING DOWN, DEPRESSED OR HOPELESS: 0
SUM OF ALL RESPONSES TO PHQ QUESTIONS 1-9: 0
SUM OF ALL RESPONSES TO PHQ9 QUESTIONS 1 & 2: 0
SUM OF ALL RESPONSES TO PHQ QUESTIONS 1-9: 0
1. LITTLE INTEREST OR PLEASURE IN DOING THINGS: 0

## 2021-10-04 ASSESSMENT — SOCIAL DETERMINANTS OF HEALTH (SDOH): HOW HARD IS IT FOR YOU TO PAY FOR THE VERY BASICS LIKE FOOD, HOUSING, MEDICAL CARE, AND HEATING?: NOT HARD AT ALL

## 2021-10-04 ASSESSMENT — ENCOUNTER SYMPTOMS
SHORTNESS OF BREATH: 0
COUGH: 0

## 2021-10-04 NOTE — PROGRESS NOTES
[address] [place]  [phone]    Kina Renteria is a 57 y.o. male who presents today for his  medical conditions/complaints as noted below. Kina Renteria is c/o of Letter for School/Work    HPI:     CHIP Vides has been off work for [employer]. Tested positve on sept 15, had been symptomatic for a few days before that. His only sxs he had was fatigue and headache. He is back to normal.  Had a fever for one day back when he was tested. Nursing note reviewed and discussed with patient. Patient's medications, allergies, past medical, surgical, social and family histories were reviewed and updated asappropriate. Current Outpatient Medications   Medication Sig Dispense Refill    isosorbide mononitrate (IMDUR) 30 MG extended release tablet Take 30 mg by mouth daily      metoprolol succinate (TOPROL XL) 50 MG extended release tablet Take 50 mg by mouth daily      atorvastatin (LIPITOR) 20 MG tablet Take 20 mg by mouth daily      aspirin 81 MG EC tablet Take 81 mg by mouth daily      nitroGLYCERIN (NITROSTAT) 0.4 MG SL tablet Place 0.4 mg under the tongue every 5 minutes as needed for Chest pain up to max of 3 total doses. If no relief after 1 dose, call 911.  apixaban (ELIQUIS) 5 MG TABS tablet Take 5 mg by mouth daily Indications: from cardio        No current facility-administered medications for this visit.      Past Medical History:   Diagnosis Date    Atrial fibrillation ([address]     CAD (coronary artery disease)     CHF (congestive heart failure) ([facility] 2/10/2015    EF 15-20%     Hyperlipidemia     Hypertension     S/P cardiac cath 01/23/2018      Past Surgical History:   Procedure Laterality Date    ABLATION OF DYSRHYTHMIC FOCUS      CORONARY ANGIOPLASTY WITH STENT PLACEMENT  01/23/2018    X 2    HERNIA REPAIR      TONSILLECTOMY      VASECTOMY  [id number]     Family History   Problem Relation Age of Onset    Cancer Mother breast metastatic    Heart Disease Father      Social History     Tobacco Use    Smoking status: Never Smoker    Smokeless tobacco: Never Used   Substance Use Topics    Alcohol use: Yes     Comment: OCCASIONAL      Allergies   Allergen Reactions    Codeine Other (See Comments)     \"upset stomach\"       Subjective:      Review of Systems   Constitutional: Negative for chills and fever. Respiratory: Negative for cough and shortness of breath. Cardiovascular: Negative for chest pain and leg swelling. Other pertinent ROS in HPI  Objective:     /70 (Site: Left Upper Arm, Position: Sitting, Cuff Size: Large Adult)   Pulse 100   Wt 175 lb (79.4 kg)   SpO2 96%   BMI 28.25 kg/m²    Physical Exam  Constitutional:       General: He is not in acute distress. Appearance: He is well-developed. HENT:      Head: Normocephalic. Right Ear: External ear normal.      Left Ear: External ear normal.   Cardiovascular:      Rate and Rhythm: Normal rate and regular rhythm. Heart sounds: Normal heart sounds. Pulmonary:      Effort: Pulmonary effort is normal.      Breath sounds: Normal breath sounds. Musculoskeletal:         General: Normal range of motion. Skin:     General: Skin is warm and dry. Neurological:      Mental Status: He is alert and oriented to person, place, and time. Assessment/PLAN   1. COVID-19  resolved    2. Other specified diabetes mellitus with other specified complication, without long-term current use of insulin (HCC)    - POCT glycosylated hemoglobin (Hb A1C)  - POCT microalbumin  - Microalbumin, Ur; Future      RTO if symptoms worsen or fail to improve  Pt agreeable with plan      Patient given educational materials - see patientinstructions. Discussed use, benefit, and side effects of prescribed medications. All patient questions answered. Pt voiced understanding. Reviewed health maintenance.   Instructed to continue current medications, diet andexercise. 1.  Ho Wade received counseling on the following healthy behaviors: nutrition, exercise and medication adherence  2. Patient given educationalmaterials when available - see patient instructions when applicable  3. Discussed use, benefit, and side effects of prescribed medications. Barriersto medication compliance addressed. All patient questions answered. Pt voiced understanding. 4.  Reviewed prior labs and health maintenance when applicable. 5.  Continue current medications, diet and exercise. CompletedRefills   Requested Prescriptions      No prescriptions requested or ordered in this encounter       This note was transcribed using dictation with Dragon services. Efforts were made to correct any errors but some words may be misinterpreted.     Electronically signed by MARCI Rabago CNP, CNP on 10/4/2021 at 1:32 PM

## 2021-10-04 NOTE — LETTER
[address]  [address]  [address] [address]  Phone: [phone]  Fax: [phone]    MARCI Giles CNP        October 4, 2021     Patient: Marya Monroy   YOB: 1960   Date of Visit: 10/4/2021       To Whom It May Concern: It is my medical opinion that Lucía Steele may return to full duty immediately with no restrictions. If you have any questions or concerns, please don't hesitate to call.     Sincerely,        MARCI Giles CNP

## 2021-10-04 NOTE — PROGRESS NOTES
Patient is present for work note  States he tested positive for covid on 9/20  Patient states he is feeling better, states he is not having any symtpoms

## 2021-10-06 ENCOUNTER — TELEPHONE (OUTPATIENT)
Dept: FAMILY MEDICINE CLINIC | Age: 61
End: 2021-10-06

## 2021-10-08 ENCOUNTER — HOSPITAL ENCOUNTER (OUTPATIENT)
Age: 61
Setting detail: SPECIMEN
Discharge: HOME | End: 2021-10-08
Payer: COMMERCIAL

## 2021-10-08 DIAGNOSIS — E13.69 OTHER SPECIFIED DIABETES MELLITUS WITH OTHER SPECIFIED COMPLICATION, WITHOUT LONG-TERM CURRENT USE OF INSULIN (HCC): ICD-10-CM

## 2021-10-08 LAB
ABSOLUTE EOS #: 0.18 K/UL (ref 0–0.44)
ABSOLUTE IMMATURE GRANULOCYTE: <0.03 K/UL (ref 0–0.3)
ABSOLUTE LYMPH #: 1.29 K/UL (ref 1.1–3.7)
ABSOLUTE MONO #: 0.54 K/UL (ref 0.1–1.2)
ALBUMIN SERPL-MCNC: 4.3 G/DL (ref 3.5–5.2)
ALBUMIN/GLOBULIN RATIO: 1.5 (ref 1–2.5)
ALP BLD-CCNC: 75 U/L (ref 40–129)
ALT SERPL-CCNC: 31 U/L (ref 5–41)
ANION GAP SERPL CALCULATED.3IONS-SCNC: 16 MMOL/L (ref 9–17)
AST SERPL-CCNC: 24 U/L
BASOPHILS # BLD: 1 % (ref 0–2)
BASOPHILS ABSOLUTE: 0.06 K/UL (ref 0–0.2)
BILIRUB SERPL-MCNC: 0.36 MG/DL (ref 0.3–1.2)
BUN BLDV-MCNC: 14 MG/DL (ref 8–23)
BUN/CREAT BLD: ABNORMAL (ref 9–20)
CALCIUM SERPL-MCNC: 9.4 MG/DL (ref 8.6–10.4)
CHLORIDE BLD-SCNC: 102 MMOL/L (ref 98–107)
CHOLESTEROL, FASTING: 259 MG/DL
CHOLESTEROL/HDL RATIO: 5.6
CO2: 21 MMOL/L (ref 20–31)
CREAT SERPL-MCNC: 0.74 MG/DL (ref 0.7–1.2)
CREATININE URINE: 53.8 MG/DL (ref 39–259)
DIFFERENTIAL TYPE: ABNORMAL
EOSINOPHILS RELATIVE PERCENT: 3 % (ref 1–4)
GFR AFRICAN AMERICAN: >60 ML/MIN
GFR NON-AFRICAN AMERICAN: >60 ML/MIN
GFR SERPL CREATININE-BSD FRML MDRD: ABNORMAL ML/MIN/{1.73_M2}
GFR SERPL CREATININE-BSD FRML MDRD: ABNORMAL ML/MIN/{1.73_M2}
GLUCOSE BLD-MCNC: 116 MG/DL (ref 70–99)
HCT VFR BLD CALC: 44.2 % (ref 40.7–50.3)
HDLC SERPL-MCNC: 46 MG/DL
HEMOGLOBIN: 14.4 G/DL (ref 13–17)
IMMATURE GRANULOCYTES: 0 %
LDL CHOLESTEROL: 171 MG/DL (ref 0–130)
LYMPHOCYTES # BLD: 22 % (ref 24–43)
MAGNESIUM: 2.2 MG/DL (ref 1.6–2.6)
MCH RBC QN AUTO: 31.4 PG (ref 25.2–33.5)
MCHC RBC AUTO-ENTMCNC: 32.6 G/DL (ref 28.4–34.8)
MCV RBC AUTO: 96.3 FL (ref 82.6–102.9)
MICROALBUMIN/CREAT 24H UR: <12 MG/L
MICROALBUMIN/CREAT UR-RTO: NORMAL MCG/MG CREAT
MONOCYTES # BLD: 9 % (ref 3–12)
NRBC AUTOMATED: 0 PER 100 WBC
PDW BLD-RTO: 12.6 % (ref 11.8–14.4)
PLATELET # BLD: 238 K/UL (ref 138–453)
PLATELET ESTIMATE: ABNORMAL
PMV BLD AUTO: 11.8 FL (ref 8.1–13.5)
POTASSIUM SERPL-SCNC: 4.2 MMOL/L (ref 3.7–5.3)
RBC # BLD: 4.59 M/UL (ref 4.21–5.77)
RBC # BLD: ABNORMAL 10*6/UL
SEG NEUTROPHILS: 65 % (ref 36–65)
SEGMENTED NEUTROPHILS ABSOLUTE COUNT: 3.8 K/UL (ref 1.5–8.1)
SODIUM BLD-SCNC: 139 MMOL/L (ref 135–144)
TOTAL PROTEIN: 7.2 G/DL (ref 6.4–8.3)
TRIGLYCERIDE, FASTING: 212 MG/DL
TSH SERPL DL<=0.05 MIU/L-ACNC: 4.05 MIU/L (ref 0.3–5)
VLDLC SERPL CALC-MCNC: ABNORMAL MG/DL (ref 1–30)
WBC # BLD: 5.9 K/UL (ref 3.5–11.3)
WBC # BLD: ABNORMAL 10*3/UL

## 2021-10-13 ENCOUNTER — OFFICE VISIT (OUTPATIENT)
Dept: FAMILY MEDICINE CLINIC | Age: 61
End: 2021-10-13
Payer: COMMERCIAL

## 2021-10-13 VITALS
OXYGEN SATURATION: 98 % | DIASTOLIC BLOOD PRESSURE: 78 MMHG | HEART RATE: 68 BPM | BODY MASS INDEX: 28.28 KG/M2 | RESPIRATION RATE: 20 BRPM | HEIGHT: 66 IN | SYSTOLIC BLOOD PRESSURE: 124 MMHG | WEIGHT: 176 LBS

## 2021-10-13 DIAGNOSIS — N40.1 BENIGN PROSTATIC HYPERPLASIA WITH WEAK URINARY STREAM: Primary | ICD-10-CM

## 2021-10-13 DIAGNOSIS — R39.12 BENIGN PROSTATIC HYPERPLASIA WITH WEAK URINARY STREAM: Primary | ICD-10-CM

## 2021-10-13 PROCEDURE — G8427 DOCREV CUR MEDS BY ELIG CLIN: HCPCS | Performed by: NURSE PRACTITIONER

## 2021-10-13 PROCEDURE — 99213 OFFICE O/P EST LOW 20 MIN: CPT | Performed by: NURSE PRACTITIONER

## 2021-10-13 PROCEDURE — G8484 FLU IMMUNIZE NO ADMIN: HCPCS | Performed by: NURSE PRACTITIONER

## 2021-10-13 PROCEDURE — 1036F TOBACCO NON-USER: CPT | Performed by: NURSE PRACTITIONER

## 2021-10-13 PROCEDURE — 3017F COLORECTAL CA SCREEN DOC REV: CPT | Performed by: NURSE PRACTITIONER

## 2021-10-13 PROCEDURE — G8419 CALC BMI OUT NRM PARAM NOF/U: HCPCS | Performed by: NURSE PRACTITIONER

## 2021-10-13 RX ORDER — TAMSULOSIN HYDROCHLORIDE 0.4 MG/1
0.4 CAPSULE ORAL DAILY
Qty: 30 CAPSULE | Refills: 1 | Status: ON HOLD | OUTPATIENT
Start: 2021-10-13 | End: 2022-02-26

## 2021-10-13 ASSESSMENT — ENCOUNTER SYMPTOMS
EYE PAIN: 0
CHEST TIGHTNESS: 0
SINUS PAIN: 0
SHORTNESS OF BREATH: 0
CONSTIPATION: 0
DIARRHEA: 0
VOMITING: 0
BACK PAIN: 0
ABDOMINAL PAIN: 0
NAUSEA: 0
SINUS PRESSURE: 0
COLOR CHANGE: 0

## 2021-10-13 NOTE — PROGRESS NOTES
[address] [place]  [phone]    Jesus Rich is a 57 y.o. male who presents today for his  medical conditions/complaints as noted below. Jesus Rich is c/o of Other (states since covid he has been having a hard to urinating. )  . HPI:     Presents for acute visit  Reports since covid-19 diagnosis on 09/20/21 - he has been having difficulty with urinating  States he has a hard time establishing a stream, when he does it feels weak   Denies dysuria, hematuria, penile discharge, back pain, fever   Notes he does get up frequently through the night to urinate  Does not ever remember having a problem with this prior to covid-19  Would like to try flomax prior to meeting with urology   PSA 11/2020 - 1.58    Denies any other problems/concerns at this time       Current Outpatient Medications   Medication Sig Dispense Refill    tamsulosin (FLOMAX) 0.4 MG capsule Take 1 capsule by mouth daily 30 capsule 1    isosorbide mononitrate (IMDUR) 30 MG extended release tablet Take 30 mg by mouth daily      metoprolol succinate (TOPROL XL) 50 MG extended release tablet Take 50 mg by mouth daily      atorvastatin (LIPITOR) 20 MG tablet Take 20 mg by mouth daily      aspirin 81 MG EC tablet Take 81 mg by mouth daily      nitroGLYCERIN (NITROSTAT) 0.4 MG SL tablet Place 0.4 mg under the tongue every 5 minutes as needed for Chest pain up to max of 3 total doses. If no relief after 1 dose, call 911.  apixaban (ELIQUIS) 5 MG TABS tablet Take 5 mg by mouth daily Indications: from cardio        No current facility-administered medications for this visit.      Past Medical History:   Diagnosis Date    [facility]rial fibrillation ([facility] [address]     CAD (coronary artery disease)     CHF (congestive heart failure) ([facility] [address] 2/10/2015    EF 15-20%     Hyperlipidemia     Hypertension     S/P cardiac cath 01/23/2018      Past Surgical History:   Procedure Laterality Date    Right Ear: Tympanic membrane normal.      Left Ear: Tympanic membrane normal.      Nose: Nose normal.      Mouth/Throat:      Mouth: Mucous membranes are moist.      Pharynx: Oropharynx is clear. Eyes:      Extraocular Movements: Extraocular movements intact. Conjunctiva/sclera: Conjunctivae normal.      Pupils: Pupils are equal, round, and reactive to light. Cardiovascular:      Rate and Rhythm: Normal rate. Rhythm regularly irregular. Pulses: Normal pulses. Heart sounds: Normal heart sounds. Pulmonary:      Effort: Pulmonary effort is normal.      Breath sounds: Normal breath sounds and air entry. No decreased breath sounds, wheezing, rhonchi or rales. Abdominal:      General: Abdomen is flat. Bowel sounds are normal.      Palpations: Abdomen is soft. Genitourinary:     Rectum: Normal.      Comments: Exam deferred  Musculoskeletal:         General: Normal range of motion. Cervical back: Normal range of motion. Skin:     General: Skin is warm and dry. Capillary Refill: Capillary refill takes less than 2 seconds. Neurological:      General: No focal deficit present. Mental Status: He is alert and oriented to person, place, and time. Mental status is at baseline. Psychiatric:         Attention and Perception: Attention and perception normal.         Mood and Affect: Mood normal.         Behavior: Behavior normal. Behavior is cooperative. Thought Content:  Thought content normal.         Cognition and Memory: Cognition and memory normal.         Judgment: Judgment normal.         Lab Review   Hospital Outpatient Visit on 10/08/2021   Component Date Value    WBC 10/08/2021 5.9     RBC 10/08/2021 4.59     Hemoglobin 10/08/2021 14.4     Hematocrit 10/08/2021 44.2     MCV 10/08/2021 96.3     MCH 10/08/2021 31.4     MCHC 10/08/2021 32.6     RDW 10/08/2021 12.6     Platelets 29/44/2734 238     MPV 10/08/2021 11.8     NRBC Automated 10/08/2021 0.0     09/20/2021 . NASOPHARYNGEAL SWAB     SARS-CoV-2 09/20/2021 DETECTED*   Admission on 09/15/2021, Discharged on 09/15/2021   Component Date Value    SARS-CoV-2 09/15/2021          Source 09/15/2021 . NASOPHARYNGEAL SWAB     SARS-CoV-2 09/15/2021 DETECTED*   Admission on 07/12/2021, Discharged on 07/12/2021   Component Date Value    Ethanol 07/12/2021 308*    Ethanol percent 07/12/2021 0.308*    Glucose 07/12/2021 120*    BUN 07/12/2021 19     CREATININE 07/12/2021 0.85     Bun/Cre Ratio 07/12/2021 NOT REPORTED     Calcium 07/12/2021 9.3     Sodium 07/12/2021 140     Potassium 07/12/2021 3.9     Chloride 07/12/2021 101     CO2 07/12/2021 26     Anion Gap 07/12/2021 13     GFR Non- 07/12/2021 >60     GFR  07/12/2021 >60     GFR Comment 07/12/2021          GFR Staging 07/12/2021 NOT REPORTED     Specimen Source 07/12/2021 . BLOOD    [employer] Ordered Test 07/12/2021 CDP     Reason for Rejection 07/12/2021 Unable to perform testing: Specimen clotted.      - 07/12/2021 NOT REPORTED     WBC 07/12/2021 7.1     RBC 07/12/2021 5.12     Hemoglobin 07/12/2021 16.2     Hematocrit 07/12/2021 49.0     MCV 07/12/2021 95.7     MCH 07/12/2021 31.6     MCHC 07/12/2021 33.1     RDW 07/12/2021 12.5     Platelets 64/79/8264 242     MPV 07/12/2021 11.1     NRBC Automated 07/12/2021 0.0     Differential Type 07/12/2021 NOT REPORTED     Seg Neutrophils 07/12/2021 69*    Lymphocytes 07/12/2021 21*    Monocytes 07/12/2021 7     Eosinophils % 07/12/2021 2     Basophils 07/12/2021 1     Immature Granulocytes 07/12/2021 0     Segs Absolute 07/12/2021 4.94     Absolute Lymph # 07/12/2021 1.46     Absolute Mono # 07/12/2021 0.50     Absolute Eos # 07/12/2021 0.15     Basophils Absolute 07/12/2021 0.06     Absolute Immature Granul* 07/12/2021 <0.03     WBC Morphology 07/12/2021 NOT REPORTED     RBC Morphology 07/12/2021 NOT REPORTED     Platelet Estimate 03/15/1223 NOT REPORTED  Ventricular Rate 07/12/2021 91     Atrial Rate 07/12/2021 202     QRS Duration 07/12/2021 86     Q-T Interval 07/12/2021 346     QTc Calculation (Bazett) 07/12/2021 425     R Axis 07/12/2021 -36     T Axis 07/12/2021 64      Assessment/PLAN   1. Benign prostatic hyperplasia with weak urinary stream  -     tamsulosin (FLOMAX) 0.4 MG capsule; Take 1 capsule by mouth daily, Disp-30 capsule, R-1Normal     1. BPH: Rx for flomax with instruction for use. Discussed this medication may cause dizziness or a dip in BP especially when taken with Toprol XL. Discussed taking medication prior to bedtime. Should these side effects occur please call the office for further discussion and evaluation. Consider referral to urology. RTO if symptoms worsen or fail to improve  Pt agreeable with plan      Patient given educational materials - see patientinstructions. Discussed use, benefit, and side effects of prescribed medications. All patient questions answered. Pt voiced understanding. Reviewed health maintenance. Instructed to continue current medications, diet andexercise. 1.  Lawrence Gonzalez received counseling on the following healthy behaviors: nutrition, exercise and medication adherence  2. Patient given educationalmaterials when available - see patient instructions when applicable  3. Discussed use, benefit, and side effects of prescribed medications. Barriersto medication compliance addressed. All patient questions answered. Pt voiced understanding. 4.  Reviewed prior labs and health maintenance when applicable. 5.  Continue current medications, diet and exercise. CompletedRefills   Requested Prescriptions     Signed Prescriptions Disp Refills    tamsulosin (FLOMAX) 0.4 MG capsule 30 capsule 1     Sig: Take 1 capsule by mouth daily       This note was transcribed using dictation with Dragon services. Efforts were made to correct any errors but some words may be misinterpreted.     Electronically signed

## 2021-11-09 DIAGNOSIS — R39.12 BENIGN PROSTATIC HYPERPLASIA WITH WEAK URINARY STREAM: ICD-10-CM

## 2021-11-09 DIAGNOSIS — N40.1 BENIGN PROSTATIC HYPERPLASIA WITH WEAK URINARY STREAM: ICD-10-CM

## 2021-11-09 RX ORDER — TAMSULOSIN HYDROCHLORIDE 0.4 MG/1
CAPSULE ORAL
Qty: 30 CAPSULE | Refills: 1 | OUTPATIENT
Start: 2021-11-09

## 2021-11-10 ENCOUNTER — HOSPITAL ENCOUNTER (OUTPATIENT)
Dept: NON INVASIVE DIAGNOSTICS | Age: 61
Discharge: HOME | End: 2021-11-10
Payer: COMMERCIAL

## 2021-11-10 ENCOUNTER — HOSPITAL ENCOUNTER (OUTPATIENT)
Dept: NUCLEAR MEDICINE | Age: 61
Discharge: HOME | End: 2021-11-12
Payer: COMMERCIAL

## 2021-11-10 DIAGNOSIS — I25.10 ATHEROSCLEROSIS OF NATIVE CORONARY ARTERY OF NATIVE HEART WITHOUT ANGINA PECTORIS: ICD-10-CM

## 2021-11-10 DIAGNOSIS — Z95.5 PRESENCE OF STENT IN ANTERIOR DESCENDING BRANCH OF LEFT CORONARY ARTERY: ICD-10-CM

## 2021-11-10 DIAGNOSIS — I48.19 PERSISTENT ATRIAL FIBRILLATION (HCC): ICD-10-CM

## 2021-11-10 DIAGNOSIS — R07.89 CHEST TIGHTNESS: ICD-10-CM

## 2021-11-10 LAB
LV EF: 28 %
LV EF: 40 %
LVEF MODALITY: NORMAL
LVEF MODALITY: NORMAL

## 2021-11-10 PROCEDURE — A9500 TC99M SESTAMIBI: HCPCS | Performed by: INTERNAL MEDICINE

## 2021-11-10 PROCEDURE — 93306 TTE W/DOPPLER COMPLETE: CPT

## 2021-11-10 PROCEDURE — 93226 XTRNL ECG REC<48 HR SCAN A/R: CPT

## 2021-11-10 PROCEDURE — 93017 CV STRESS TEST TRACING ONLY: CPT

## 2021-11-10 PROCEDURE — 93356 MYOCRD STRAIN IMG SPCKL TRCK: CPT

## 2021-11-10 PROCEDURE — 78452 HT MUSCLE IMAGE SPECT MULT: CPT

## 2021-11-10 PROCEDURE — 6360000002 HC RX W HCPCS: Performed by: INTERNAL MEDICINE

## 2021-11-10 PROCEDURE — 2580000003 HC RX 258: Performed by: INTERNAL MEDICINE

## 2021-11-10 PROCEDURE — 3430000000 HC RX DIAGNOSTIC RADIOPHARMACEUTICAL: Performed by: INTERNAL MEDICINE

## 2021-11-10 PROCEDURE — 93225 XTRNL ECG REC<48 HRS REC: CPT

## 2021-11-10 RX ORDER — SODIUM CHLORIDE 0.9 % (FLUSH) 0.9 %
5-40 SYRINGE (ML) INJECTION PRN
Status: DISCONTINUED | OUTPATIENT
Start: 2021-11-10 | End: 2021-11-10 | Stop reason: HOSPADM

## 2021-11-10 RX ORDER — NITROGLYCERIN 0.4 MG/1
0.4 TABLET SUBLINGUAL EVERY 5 MIN PRN
Status: DISCONTINUED | OUTPATIENT
Start: 2021-11-10 | End: 2021-11-10 | Stop reason: HOSPADM

## 2021-11-10 RX ORDER — ATROPINE SULFATE 0.1 MG/ML
0.5 INJECTION INTRAVENOUS EVERY 5 MIN PRN
Status: DISCONTINUED | OUTPATIENT
Start: 2021-11-10 | End: 2021-11-10 | Stop reason: HOSPADM

## 2021-11-10 RX ORDER — AMINOPHYLLINE DIHYDRATE 25 MG/ML
50 INJECTION, SOLUTION INTRAVENOUS PRN
Status: DISCONTINUED | OUTPATIENT
Start: 2021-11-10 | End: 2021-11-10 | Stop reason: HOSPADM

## 2021-11-10 RX ORDER — METOPROLOL TARTRATE 5 MG/5ML
5 INJECTION INTRAVENOUS EVERY 5 MIN PRN
Status: DISCONTINUED | OUTPATIENT
Start: 2021-11-10 | End: 2021-11-10 | Stop reason: HOSPADM

## 2021-11-10 RX ORDER — SODIUM CHLORIDE 0.9 % (FLUSH) 0.9 %
10 SYRINGE (ML) INJECTION PRN
Status: DISCONTINUED | OUTPATIENT
Start: 2021-11-10 | End: 2021-11-13 | Stop reason: HOSPADM

## 2021-11-10 RX ADMIN — SODIUM CHLORIDE, PRESERVATIVE FREE 10 ML: 5 INJECTION INTRAVENOUS at 10:35

## 2021-11-10 RX ADMIN — TETRAKIS(2-METHOXYISOBUTYLISOCYANIDE)COPPER(I) TETRAFLUOROBORATE 13.8 MILLICURIE: 1 INJECTION, POWDER, LYOPHILIZED, FOR SOLUTION INTRAVENOUS at 09:25

## 2021-11-10 RX ADMIN — SODIUM CHLORIDE, PRESERVATIVE FREE 10 ML: 5 INJECTION INTRAVENOUS at 10:37

## 2021-11-10 RX ADMIN — SODIUM CHLORIDE, PRESERVATIVE FREE 10 ML: 5 INJECTION INTRAVENOUS at 09:25

## 2021-11-10 RX ADMIN — TETRAKIS(2-METHOXYISOBUTYLISOCYANIDE)COPPER(I) TETRAFLUOROBORATE 37.2 MILLICURIE: 1 INJECTION, POWDER, LYOPHILIZED, FOR SOLUTION INTRAVENOUS at 10:37

## 2021-11-10 RX ADMIN — REGADENOSON 0.4 MG: 0.08 INJECTION, SOLUTION INTRAVENOUS at 10:35

## 2021-11-10 NOTE — PROCEDURES
[address]                  [id number] [facility] [address]                              CARDIAC STRESS TEST    PATIENT NAME: Stacey Hicks                    :        1960  MED REC NO:   [id number]                             ROOM:  ACCOUNT NO:   [de-identified]                           ADMIT DATE: 11/10/2021  PROVIDER:     Anabella Roberto    DATE OF STUDY:  11/10/2021    ORDERING PROVID[facility]    PRIMARY CARE PROVIDER: MEMO Neely    INTERPRETING PHYSICIAN: Dr. Anabella Nixon. Felisa    LEXISCAN STRESS STUDY:  Indication: Chest tightness  Procedure explained and consent signed. Medications: Lexiscan, 0.4 mg  Resting heart rate: 90 bpm  Resting blood pressure:  146/88 mm/Hg  Infusion heart rate:  113 bpm  Infusion blood pressure:  154/111 mm/Hg  Resting EKG: Abnormal, atrial fibrillation, old anterior MI  Stress heart response: Normal Response  Stress BP response: Appropriate  Stress EKG(s): No changes seen  Chest discomfort: None  Ischemic EKG changes: None    IMPRESSION:  Electrocardiographically Negative Lexiscan stress study. Radio-isotope  results to follow from the department of Nuclear Medicine.           Trinidad Hernández    D: 11/10/2021 14:40:18       T: 11/10/2021 14:41:52     [id number]  Job#: [id number]     Doc#: Unknown    CC:

## 2021-11-27 NOTE — PROCEDURES
[address], [facility] [address]                                 HOLTER MONITOR    PATIENT NAME: Doe Mcelroy                    :        1960  MED REC NO:   [id number]                             ROOM:  ACCOUNT NO:   [de-identified]                           ADMIT DATE: 11/10/2021  PROVIDER:     Mohsen Lovelace    HOLTER MONITOR 48-HOURS    DATE OF STUDY:  11/10/2021    INDICATIONS: Atrial fibrillation    ORDERING PROVIDER: Dr. Roxana Corley. Raven    PRIMARY CARE PROVIDER: Dr. Abran Moraes, APRN-CNP    INTERPRETING PHYSICIAN: Dr. Mendez Zacarias:  The patient appeared to remain in Atrial fibrillation with rates 63 to  106 bpm. Occasional Premature ventricular complexes with pairs noted. The patient recorded diary events, the rhythm was atrial fibrillation. 1. Atrial fibrillation with rates from 63 to 106 bpm.  2. Occasional Premature ventricular complexes with pairs and triplets  3.  No pauses                   FER PATE    D: 2021 13:36:27       T: 2021 13:41:38     [id number]  Job#: [id number]     Doc#: Unknown    CC:

## 2022-02-26 ENCOUNTER — HOSPITAL ENCOUNTER (INPATIENT)
Age: 62
LOS: 3 days | Discharge: HOME | DRG: 291 | End: 2022-03-01
Attending: EMERGENCY MEDICINE | Admitting: INTERNAL MEDICINE
Payer: COMMERCIAL

## 2022-02-26 ENCOUNTER — APPOINTMENT (OUTPATIENT)
Dept: GENERAL RADIOLOGY | Age: 62
DRG: 291 | End: 2022-02-26
Payer: COMMERCIAL

## 2022-02-26 DIAGNOSIS — I50.43 ACUTE ON CHRONIC COMBINED SYSTOLIC (CONGESTIVE) AND DIASTOLIC (CONGESTIVE) HEART FAILURE (HCC): ICD-10-CM

## 2022-02-26 DIAGNOSIS — R06.02 SHORTNESS OF BREATH: ICD-10-CM

## 2022-02-26 DIAGNOSIS — R09.02 HYPOXIA: Primary | ICD-10-CM

## 2022-02-26 PROBLEM — J06.9 UPPER RESPIRATORY INFECTION, VIRAL: Status: ACTIVE | Noted: 2022-02-26

## 2022-02-26 LAB
ABSOLUTE EOS #: 0.34 K/UL (ref 0–0.44)
ABSOLUTE IMMATURE GRANULOCYTE: 0.05 K/UL (ref 0–0.3)
ABSOLUTE LYMPH #: 1.3 K/UL (ref 1.1–3.7)
ABSOLUTE MONO #: 0.95 K/UL (ref 0.1–1.2)
ADENOVIRUS PCR: NOT DETECTED
ANION GAP SERPL CALCULATED.3IONS-SCNC: 11 MMOL/L (ref 9–17)
BASOPHILS # BLD: 1 % (ref 0–2)
BASOPHILS ABSOLUTE: 0.07 K/UL (ref 0–0.2)
BORDETELLA PARAPERTUSSIS: NOT DETECTED
BORDETELLA PERTUSSIS PCR: NOT DETECTED
BUN BLDV-MCNC: 16 MG/DL (ref 8–23)
CALCIUM SERPL-MCNC: 9.1 MG/DL (ref 8.6–10.4)
CHLAMYDIA PNEUMONIAE BY PCR: NOT DETECTED
CHLORIDE BLD-SCNC: 101 MMOL/L (ref 98–107)
CO2: 23 MMOL/L (ref 20–31)
CORONAVIRUS 229E PCR: NOT DETECTED
CORONAVIRUS HKU1 PCR: NOT DETECTED
CORONAVIRUS NL63 PCR: NOT DETECTED
CORONAVIRUS OC43 PCR: NOT DETECTED
CREAT SERPL-MCNC: 0.57 MG/DL (ref 0.7–1.2)
D-DIMER QUANTITATIVE: 0.56 MG/L FEU
EOSINOPHILS RELATIVE PERCENT: 3 % (ref 1–4)
GFR AFRICAN AMERICAN: >60 ML/MIN
GFR NON-AFRICAN AMERICAN: >60 ML/MIN
GFR SERPL CREATININE-BSD FRML MDRD: ABNORMAL ML/MIN/{1.73_M2}
GLUCOSE BLD-MCNC: 111 MG/DL (ref 70–99)
HCT VFR BLD CALC: 46.7 % (ref 40.7–50.3)
HEMOGLOBIN: 15.5 G/DL (ref 13–17)
HUMAN METAPNEUMOVIRUS PCR: NOT DETECTED
IMMATURE GRANULOCYTES: 1 %
INFLUENZA A BY PCR: NOT DETECTED
INFLUENZA B BY PCR: NOT DETECTED
LYMPHOCYTES # BLD: 13 % (ref 24–43)
MCH RBC QN AUTO: 31.8 PG (ref 25.2–33.5)
MCHC RBC AUTO-ENTMCNC: 33.2 G/DL (ref 28.4–34.8)
MCV RBC AUTO: 95.7 FL (ref 82.6–102.9)
MONOCYTES # BLD: 9 % (ref 3–12)
MYCOPLASMA PNEUMONIAE PCR: NOT DETECTED
NRBC AUTOMATED: 0 PER 100 WBC
PARAINFLUENZA 1 PCR: NOT DETECTED
PARAINFLUENZA 2 PCR: NOT DETECTED
PARAINFLUENZA 3 PCR: NOT DETECTED
PARAINFLUENZA 4 PCR: NOT DETECTED
PDW BLD-RTO: 12.2 % (ref 11.8–14.4)
PLATELET # BLD: 203 K/UL (ref 138–453)
PMV BLD AUTO: 11.7 FL (ref 8.1–13.5)
POTASSIUM SERPL-SCNC: 4.2 MMOL/L (ref 3.7–5.3)
PRO-BNP: 723 PG/ML
PROCALCITONIN: 0.2 NG/ML
PROCALCITONIN: 0.21 NG/ML
RBC # BLD: 4.88 M/UL (ref 4.21–5.77)
RESP SYNCYTIAL VIRUS PCR: NOT DETECTED
RHINO/ENTEROVIRUS PCR: DETECTED
SARS-COV-2, PCR: NOT DETECTED
SARS-COV-2, RAPID: NOT DETECTED
SEG NEUTROPHILS: 73 % (ref 36–65)
SEGMENTED NEUTROPHILS ABSOLUTE COUNT: 7.49 K/UL (ref 1.5–8.1)
SODIUM BLD-SCNC: 135 MMOL/L (ref 135–144)
SPECIMEN DESCRIPTION: ABNORMAL
SPECIMEN DESCRIPTION: NORMAL
TROPONIN, HIGH SENSITIVITY: 15 NG/L (ref 0–22)
TROPONIN, HIGH SENSITIVITY: 16 NG/L (ref 0–22)
TSH SERPL DL<=0.05 MIU/L-ACNC: 2.55 MIU/L (ref 0.3–5)
WBC # BLD: 10.2 K/UL (ref 3.5–11.3)

## 2022-02-26 PROCEDURE — 6370000000 HC RX 637 (ALT 250 FOR IP): Performed by: STUDENT IN AN ORGANIZED HEALTH CARE EDUCATION/TRAINING PROGRAM

## 2022-02-26 PROCEDURE — 84145 PROCALCITONIN (PCT): CPT

## 2022-02-26 PROCEDURE — 84443 ASSAY THYROID STIM HORMONE: CPT

## 2022-02-26 PROCEDURE — 36600 WITHDRAWAL OF ARTERIAL BLOOD: CPT

## 2022-02-26 PROCEDURE — 0202U NFCT DS 22 TRGT SARS-COV-2: CPT

## 2022-02-26 PROCEDURE — 2580000003 HC RX 258: Performed by: STUDENT IN AN ORGANIZED HEALTH CARE EDUCATION/TRAINING PROGRAM

## 2022-02-26 PROCEDURE — 2060000000 HC ICU INTERMEDIATE R&B

## 2022-02-26 PROCEDURE — 6360000002 HC RX W HCPCS: Performed by: STUDENT IN AN ORGANIZED HEALTH CARE EDUCATION/TRAINING PROGRAM

## 2022-02-26 PROCEDURE — 84484 ASSAY OF TROPONIN QUANT: CPT

## 2022-02-26 PROCEDURE — 85025 COMPLETE CBC W/AUTO DIFF WBC: CPT

## 2022-02-26 PROCEDURE — 82803 BLOOD GASES ANY COMBINATION: CPT

## 2022-02-26 PROCEDURE — 99223 1ST HOSP IP/OBS HIGH 75: CPT | Performed by: INTERNAL MEDICINE

## 2022-02-26 PROCEDURE — 6360000002 HC RX W HCPCS: Performed by: EMERGENCY MEDICINE

## 2022-02-26 PROCEDURE — 83880 ASSAY OF NATRIURETIC PEPTIDE: CPT

## 2022-02-26 PROCEDURE — 96375 TX/PRO/DX INJ NEW DRUG ADDON: CPT

## 2022-02-26 PROCEDURE — 94664 DEMO&/EVAL PT USE INHALER: CPT

## 2022-02-26 PROCEDURE — 2500000003 HC RX 250 WO HCPCS: Performed by: STUDENT IN AN ORGANIZED HEALTH CARE EDUCATION/TRAINING PROGRAM

## 2022-02-26 PROCEDURE — 2700000000 HC OXYGEN THERAPY PER DAY

## 2022-02-26 PROCEDURE — 85379 FIBRIN DEGRADATION QUANT: CPT

## 2022-02-26 PROCEDURE — 99285 EMERGENCY DEPT VISIT HI MDM: CPT

## 2022-02-26 PROCEDURE — 87635 SARS-COV-2 COVID-19 AMP PRB: CPT

## 2022-02-26 PROCEDURE — 96374 THER/PROPH/DIAG INJ IV PUSH: CPT

## 2022-02-26 PROCEDURE — 83735 ASSAY OF MAGNESIUM: CPT

## 2022-02-26 PROCEDURE — 36415 COLL VENOUS BLD VENIPUNCTURE: CPT

## 2022-02-26 PROCEDURE — 94640 AIRWAY INHALATION TREATMENT: CPT

## 2022-02-26 PROCEDURE — 71045 X-RAY EXAM CHEST 1 VIEW: CPT

## 2022-02-26 PROCEDURE — 80048 BASIC METABOLIC PNL TOTAL CA: CPT

## 2022-02-26 RX ORDER — POLYETHYLENE GLYCOL 3350 17 G/17G
17 POWDER, FOR SOLUTION ORAL DAILY PRN
Status: DISCONTINUED | OUTPATIENT
Start: 2022-02-26 | End: 2022-03-01 | Stop reason: HOSPADM

## 2022-02-26 RX ORDER — ONDANSETRON 2 MG/ML
4 INJECTION INTRAMUSCULAR; INTRAVENOUS EVERY 6 HOURS PRN
Status: DISCONTINUED | OUTPATIENT
Start: 2022-02-26 | End: 2022-03-01 | Stop reason: HOSPADM

## 2022-02-26 RX ORDER — ASPIRIN 81 MG/1
81 TABLET, CHEWABLE ORAL DAILY
Status: DISCONTINUED | OUTPATIENT
Start: 2022-02-27 | End: 2022-03-01 | Stop reason: HOSPADM

## 2022-02-26 RX ORDER — IPRATROPIUM BROMIDE AND ALBUTEROL SULFATE 2.5; .5 MG/3ML; MG/3ML
1 SOLUTION RESPIRATORY (INHALATION)
Status: DISCONTINUED | OUTPATIENT
Start: 2022-02-26 | End: 2022-02-26

## 2022-02-26 RX ORDER — ACETAMINOPHEN 650 MG/1
650 SUPPOSITORY RECTAL EVERY 6 HOURS PRN
Status: DISCONTINUED | OUTPATIENT
Start: 2022-02-26 | End: 2022-03-01 | Stop reason: HOSPADM

## 2022-02-26 RX ORDER — TAMSULOSIN HYDROCHLORIDE 0.4 MG/1
0.4 CAPSULE ORAL DAILY
Status: DISCONTINUED | OUTPATIENT
Start: 2022-02-26 | End: 2022-02-26

## 2022-02-26 RX ORDER — ATORVASTATIN CALCIUM 20 MG/1
20 TABLET, FILM COATED ORAL DAILY
Status: DISCONTINUED | OUTPATIENT
Start: 2022-02-26 | End: 2022-02-26

## 2022-02-26 RX ORDER — LISINOPRIL 5 MG/1
5 TABLET ORAL DAILY
COMMUNITY

## 2022-02-26 RX ORDER — ATORVASTATIN CALCIUM 20 MG/1
20 TABLET, FILM COATED ORAL NIGHTLY
Status: DISCONTINUED | OUTPATIENT
Start: 2022-02-27 | End: 2022-02-27

## 2022-02-26 RX ORDER — ASPIRIN 81 MG/1
81 TABLET ORAL DAILY
Status: DISCONTINUED | OUTPATIENT
Start: 2022-02-26 | End: 2022-02-26

## 2022-02-26 RX ORDER — ALBUTEROL SULFATE 2.5 MG/3ML
2.5 SOLUTION RESPIRATORY (INHALATION) 4 TIMES DAILY
Status: DISCONTINUED | OUTPATIENT
Start: 2022-02-26 | End: 2022-02-27

## 2022-02-26 RX ORDER — ALBUTEROL SULFATE 2.5 MG/3ML
2.5 SOLUTION RESPIRATORY (INHALATION) EVERY 6 HOURS PRN
Status: DISCONTINUED | OUTPATIENT
Start: 2022-02-26 | End: 2022-03-01 | Stop reason: HOSPADM

## 2022-02-26 RX ORDER — ALBUTEROL SULFATE 2.5 MG/3ML
2.5 SOLUTION RESPIRATORY (INHALATION)
Status: DISCONTINUED | OUTPATIENT
Start: 2022-02-26 | End: 2022-02-27

## 2022-02-26 RX ORDER — FUROSEMIDE 10 MG/ML
20 INJECTION INTRAMUSCULAR; INTRAVENOUS ONCE
Status: COMPLETED | OUTPATIENT
Start: 2022-02-26 | End: 2022-02-26

## 2022-02-26 RX ORDER — ISOSORBIDE MONONITRATE 30 MG/1
30 TABLET, EXTENDED RELEASE ORAL DAILY
Status: DISCONTINUED | OUTPATIENT
Start: 2022-02-26 | End: 2022-02-26

## 2022-02-26 RX ORDER — PREDNISONE 20 MG/1
40 TABLET ORAL DAILY
Status: DISCONTINUED | OUTPATIENT
Start: 2022-02-26 | End: 2022-02-27

## 2022-02-26 RX ORDER — LEVALBUTEROL INHALATION SOLUTION 1.25 MG/3ML
1.25 SOLUTION RESPIRATORY (INHALATION) EVERY 8 HOURS PRN
Status: DISCONTINUED | OUTPATIENT
Start: 2022-02-26 | End: 2022-02-27

## 2022-02-26 RX ORDER — SODIUM CHLORIDE 9 MG/ML
25 INJECTION, SOLUTION INTRAVENOUS PRN
Status: DISCONTINUED | OUTPATIENT
Start: 2022-02-26 | End: 2022-03-01 | Stop reason: HOSPADM

## 2022-02-26 RX ORDER — SODIUM CHLORIDE 0.9 % (FLUSH) 0.9 %
5-40 SYRINGE (ML) INJECTION EVERY 12 HOURS SCHEDULED
Status: DISCONTINUED | OUTPATIENT
Start: 2022-02-26 | End: 2022-03-01 | Stop reason: HOSPADM

## 2022-02-26 RX ORDER — ONDANSETRON 4 MG/1
4 TABLET, ORALLY DISINTEGRATING ORAL EVERY 8 HOURS PRN
Status: DISCONTINUED | OUTPATIENT
Start: 2022-02-26 | End: 2022-03-01 | Stop reason: HOSPADM

## 2022-02-26 RX ORDER — DILTIAZEM HYDROCHLORIDE 5 MG/ML
20 INJECTION INTRAVENOUS ONCE
Status: COMPLETED | OUTPATIENT
Start: 2022-02-26 | End: 2022-02-26

## 2022-02-26 RX ORDER — SODIUM CHLORIDE 0.9 % (FLUSH) 0.9 %
5-40 SYRINGE (ML) INJECTION PRN
Status: DISCONTINUED | OUTPATIENT
Start: 2022-02-26 | End: 2022-03-01 | Stop reason: HOSPADM

## 2022-02-26 RX ORDER — METOPROLOL SUCCINATE 50 MG/1
50 TABLET, EXTENDED RELEASE ORAL DAILY
Status: DISCONTINUED | OUTPATIENT
Start: 2022-02-26 | End: 2022-03-01

## 2022-02-26 RX ORDER — BUDESONIDE AND FORMOTEROL FUMARATE DIHYDRATE 80; 4.5 UG/1; UG/1
2 AEROSOL RESPIRATORY (INHALATION) 2 TIMES DAILY
Status: DISCONTINUED | OUTPATIENT
Start: 2022-02-26 | End: 2022-03-01 | Stop reason: HOSPADM

## 2022-02-26 RX ORDER — ACETAMINOPHEN 325 MG/1
650 TABLET ORAL EVERY 6 HOURS PRN
Status: DISCONTINUED | OUTPATIENT
Start: 2022-02-26 | End: 2022-03-01 | Stop reason: HOSPADM

## 2022-02-26 RX ADMIN — SODIUM CHLORIDE, PRESERVATIVE FREE 10 ML: 5 INJECTION INTRAVENOUS at 20:50

## 2022-02-26 RX ADMIN — PREDNISONE 40 MG: 20 TABLET ORAL at 17:29

## 2022-02-26 RX ADMIN — ALBUTEROL SULFATE 2.5 MG: 2.5 SOLUTION RESPIRATORY (INHALATION) at 15:34

## 2022-02-26 RX ADMIN — FUROSEMIDE 20 MG: 10 INJECTION, SOLUTION INTRAVENOUS at 09:54

## 2022-02-26 RX ADMIN — ALBUTEROL SULFATE 2.5 MG: 2.5 SOLUTION RESPIRATORY (INHALATION) at 10:35

## 2022-02-26 RX ADMIN — RIVAROXABAN 20 MG: 20 TABLET, FILM COATED ORAL at 17:29

## 2022-02-26 RX ADMIN — IPRATROPIUM BROMIDE 0.25 MG: 0.5 SOLUTION RESPIRATORY (INHALATION) at 10:33

## 2022-02-26 RX ADMIN — DILTIAZEM HYDROCHLORIDE 20 MG: 5 INJECTION INTRAVENOUS at 09:53

## 2022-02-26 RX ADMIN — METOPROLOL SUCCINATE 50 MG: 50 TABLET, FILM COATED, EXTENDED RELEASE ORAL at 17:29

## 2022-02-26 RX ADMIN — ALBUTEROL SULFATE 2.5 MG: 2.5 SOLUTION RESPIRATORY (INHALATION) at 10:32

## 2022-02-26 RX ADMIN — ALBUTEROL SULFATE 2.5 MG: 2.5 SOLUTION RESPIRATORY (INHALATION) at 23:42

## 2022-02-26 ASSESSMENT — ENCOUNTER SYMPTOMS
BLOOD IN STOOL: 0
DIARRHEA: 0
BACK PAIN: 0
SHORTNESS OF BREATH: 1
ABDOMINAL PAIN: 0
NAUSEA: 0
RHINORRHEA: 0
COUGH: 1
SORE THROAT: 0
VOMITING: 0
WHEEZING: 0

## 2022-02-26 NOTE — ED NOTES
X-ray at bedside     Jewell Reece, [facility]  02/26/22 1951

## 2022-02-26 NOTE — Clinical Note
Discharge Plan[de-identified] Home with Office Follow-up   Telemetry/Cardiac Monitoring Required?: Yes   Bed request comments: SAILAJA rivera

## 2022-02-26 NOTE — ED PROVIDER NOTES
[address]  ED  Emergency Department Encounter  EmergencyMedicine Resident     Pt Name:Miko Alcaraz  MRN: [id number]  [place] 1960  Date of evaluation: 2/26/22  PCP:  MARCI Sullivan CNP    This patient was evaluated in the Emergency Department for symptoms described in the history of present illness. The patient was evaluated in the context of the global COVID-19 pandemic, which necessitated consideration that the patient might be at risk for infection with the SARS-CoV-2 virus that causes COVID-19. Institutional protocols and algorithms that pertain to the evaluation of patients at risk for COVID-19 are in a state of rapid change based on information released by regulatory bodies including the CDC and federal and state organizations. These policies and algorithms were followed during the patient's care in the ED. CHIEF COMPLAINT       Chief Complaint   Patient presents with    Shortness of Breath     started today, cough started yesterday       HISTORY OF PRESENT ILLNESS  (Location/Symptom, Timing/Onset, Context/Setting, Quality, Duration, Modifying Factors, Severity.)      Isa Steinberg is a 57 y.o. male who presents with shortness of breath that began last night at rest.  Patient states his shortness of breath came on suddenly. He has had a cough for the past several weeks that is productive of a small amount of yellow mucus, which he states has been worsening recently. Patient has history of CAD s/p cath, CHF, and A. fib on metoprolol and Eliquis. He denies chest pain, headache, changes in vision, lightheadedness, dizziness, weakness, developing, nausea, vomiting, diarrhea, constipation, fevers, chills, numbness, tingling, weakness. He is not vaccinated for Covid. Patient SPO2 83% on room air on arrival.  Patient does not use home oxygen. Stress test 11/10/21 - intermediate risk. No stress induced ischemia. No infarct. ECHO 11/10/21 - LVEF 25-30%. Global LV systolic function severely reduced. Grade III diastolic dysfunction. Sclerotic aortic valve. Trivial aortic insufficiency. Mild MR. Mild TR. PAST MEDICAL / SURGICAL / SOCIAL / FAMILY HISTORY      has a past medical history of Atrial fibrillation ([facility] [address] CAD (coronary artery disease), CHF (congestive heart failure) ([facility] [address] Hyperlipidemia, Hypertension, and S/P cardiac cath. has a past surgical history that includes Vasectomy ([id number]); Tonsillectomy; hernia repair; Coronary angioplasty with stent (01/23/2018); and ablation of dysrhythmic focus. Social History     Socioeconomic History    Marital status: Single     Spouse name: Not on file    Number of children: Not on file    Years of education: Not on file    Highest education level: Not on file   Occupational History    Not on file   Tobacco Use    Smoking status: Never Smoker    Smokeless tobacco: Never Used   Substance and Sexual Activity    Alcohol use: Yes     Comment: OCCASIONAL    Drug use: No    Sexual activity: Not on file   Other Topics Concern    Not on file   Social History Narrative    Not on file     Social Determinants of Health     Financial Resource Strain: Low Risk     Difficulty of Paying Living Expenses: Not hard at all   Food Insecurity: No Food Insecurity    Worried About Running Out of Food in the Last Year: Never true    [address] in the Last Year: Never true   Transportation Needs:     Lack of Transportation (Medical): Not on file    Lack of Transportation (Non-Medical):  Not on file   Physical Activity:     Days of Exercise per Week: Not on file    Minutes of Exercise per Session: Not on file   Stress:     Feeling of Stress : Not on file   Social Connections:     Frequency of Communication with Friends and Family: Not on file    Frequency of Social Gatherings with Friends and Family: Not on file    Attends [employer]ism Services: Not on file    Active Member of Clubs or Organizations: Not on file  Attends Club or Organization Meetings: Not on file    Marital Status: Not on file   Intimate Partner Violence:     Fear of Current or Ex-Partner: Not on file    Emotionally Abused: Not on file    Physically Abused: Not on file    Sexually Abused: Not on file   Housing Stability:     Unable to Pay for Housing in the Last Year: Not on file    Number of [place] in the Last Year: Not on file    Unstable Housing in the Last Year: Not on file       Family History   Problem Relation Age of Onset    Cancer Mother         breast metastatic    Heart Disease Father        Allergies:  Codeine    Home Medications:  Prior to Admission medications    Medication Sig Start Date End Date Taking? Authorizing Provider   tamsulosin (FLOMAX) 0.4 MG capsule Take 1 capsule by mouth daily 10/13/21   MARCI Juarez NP   isosorbide mononitrate (IMDUR) 30 MG extended release tablet Take 30 mg by mouth daily 9/13/21   Historical Provider, MD   metoprolol succinate (TOPROL XL) 50 MG extended release tablet Take 50 mg by mouth daily 9/13/21   Historical Provider, MD   atorvastatin (LIPITOR) 20 MG tablet Take 20 mg by mouth daily 9/13/21   Historical Provider, MD   aspirin 81 MG EC tablet Take 81 mg by mouth daily    Historical Provider, MD   nitroGLYCERIN (NITROSTAT) 0.4 MG SL tablet Place 0.4 mg under the tongue every 5 minutes as needed for Chest pain up to max of 3 total doses. If no relief after 1 dose, call 911. Historical Provider, MD   apixaban (ELIQUIS) 5 MG TABS tablet Take 5 mg by mouth daily Indications: from cardio     Historical Provider, MD       REVIEW OF SYSTEMS    (2-9 systems for level 4, 10 or more for level 5)      Review of Systems   Constitutional: Negative for activity change, appetite change, chills and fever. HENT: Negative for congestion, rhinorrhea and sore throat. Eyes: Negative for visual disturbance.    Respiratory: Positive for cough (Cough productive of small amount of yellow mucus) and shortness of breath. Cardiovascular: Negative for chest pain and palpitations. Gastrointestinal: Negative for abdominal pain, diarrhea, nausea and vomiting. Genitourinary: Negative for dysuria. Musculoskeletal: Negative for arthralgias, back pain, gait problem and myalgias. Skin: Negative for pallor and rash. Neurological: Negative for dizziness, syncope, weakness, light-headedness, numbness and headaches. All other systems reviewed and are negative. PHYSICAL EXAM   (up to 7 for level 4, 8 or more for level 5)      INITIAL VITALS:   /89   Pulse 107   Temp 98.2 °F (36.8 °C) (Oral)   Resp 23   Ht 5' 6\" (1.676 m)   Wt 170 lb (77.1 kg)   SpO2 95%   BMI 27.44 kg/m²     Physical Exam  Vitals reviewed. Constitutional:       General: He is not in acute distress. Appearance: He is not toxic-appearing. HENT:      Head: Normocephalic and atraumatic. Right Ear: Tympanic membrane normal.      Left Ear: Tympanic membrane normal.      Nose: Nose normal.      Mouth/Throat:      Mouth: Mucous membranes are moist.      Pharynx: Oropharynx is clear. Eyes:      Extraocular Movements: Extraocular movements intact. Pupils: Pupils are equal, round, and reactive to light. Neck:      Vascular: JVD present. Cardiovascular:      Rate and Rhythm: Normal rate and regular rhythm. Pulses: Normal pulses. Heart sounds: Normal heart sounds. Pulmonary:      Effort: Respiratory distress (83% on RA on arrival. Improved to 95% on 6 L O2) present. Breath sounds: No stridor. Decreased breath sounds and wheezing (Diffuse bilateral wheezing) present. No rhonchi or rales. Abdominal:      Palpations: Abdomen is soft. Tenderness: There is no abdominal tenderness. There is no guarding or rebound. Musculoskeletal:         General: Normal range of motion. Cervical back: Normal range of motion and neck supple.    Skin:     Capillary Refill: Capillary refill takes less than 2 seconds. Coloration: Skin is not pale. Findings: No rash. Neurological:      General: No focal deficit present. Mental Status: He is alert and oriented to person, place, and time. Sensory: No sensory deficit. Motor: No weakness. DIFFERENTIAL  DIAGNOSIS     PLAN (LABS / IMAGING / EKG):  Orders Placed This Encounter   Procedures    COVID-19, Rapid    XR CHEST PORTABLE    CBC with Auto Differential    Basic Metabolic Panel    Brain Natriuretic Peptide    Troponin    D-Dimer, Quantitative    Troponin    BLOOD GAS, ARTERIAL    Procalcitonin    TSH with Reflex    Telemetry monitoring - continuous duration    Inpatient consult to Internal Medicine    Initiate Oxygen Therapy Protocol    Initiate ED RT Aerosol protocol    Initiate RT Protocol    Respiratory Care Evaluation and Treat    ADMIT TO INPATIENT    ADMIT TO INPATIENT       MEDICATIONS ORDERED:  Orders Placed This Encounter   Medications    DISCONTD: ipratropium-albuterol (DUONEB) nebulizer solution 1 ampule     Order Specific Question:   Initiate RT Bronchodilator Protocol     Answer: Yes    dilTIAZem injection 20 mg    furosemide (LASIX) injection 20 mg    AND Linked Order Group     albuterol (PROVENTIL) nebulizer solution 2.5 mg      Order Specific Question:   Initiate RT Bronchodilator Protocol      Answer:   No     ipratropium (ATROVENT) 0.02 % nebulizer solution 0.25 mg      Order Specific Question:   Initiate RT Bronchodilator Protocol      Answer:   No    albuterol (PROVENTIL) nebulizer solution 2.5 mg     Order Specific Question:   Initiate RT Bronchodilator Protocol     Answer: Yes    albuterol (PROVENTIL) nebulizer solution 2.5 mg     Order Specific Question:   Initiate RT Bronchodilator Protocol     Answer:    Yes    budesonide-formoterol (SYMBICORT) 80-4.5 MCG/ACT inhaler 2 puff       DDX: COPD exacerbation, asthma, pneumothorax, anaphylaxis, anxiety, PE , pericardial effusion, CHF, Co-signs this chart in the absence of a cardiologist.    EMERGENCY DEPARTMENT COURSE:  ED Course as of 02/26/22 1455   Sat Feb 26, 2022   0956 SARS-CoV-2, Rapid: Not Detected [JG]   1000 Patient in Afib with 's. Given 20 mg diltiazem (home med is metoprolol, but patient is currently receiving albuterol). Given 20 mg lasix [JG]   1020 Respiratory treatment in progress [JG]   1030 Pro-BNP(!): 723 [JG]   1030 Negative D dimer based on YEARS criteria [JG]   1030 Troponin, High Sensitivity: 15 [JG]   1030 CXR showing no acute process. [JG]   1100 Lung sounds improved on auscultation. Decrease in wheezing, increased breath sounds throughout. Still overall diminished breath sounds with mild crackles in base of left lung [JG]   1119 Troponin, High Sensitivity: 16 [JG]   1150 HR 90's, Afib rate controlled [JG]   1209 Attempted to lower O2 to 5 L, patient desaturated to 84%. O2 remains at 6L [JG]   1400 Patient went to restroom and did not replace his O2 when he came back to room, on reevaluation his SpO2 was 82% on RA. 6L O2 replaced, SpO2 back up to 93%. [JG]      ED Course User Index  [JG] Brenda Mike MD       CONSULTS:  IP CONSULT TO INTERNAL MEDICINE  IP CONSULT TO HEART FAILURE NURSE/COORDINATOR  IP CONSULT TO CASE MANAGEMENT    FINAL IMPRESSION      1. Hypoxia    2. Shortness of breath          DISPOSITION / PLAN     DISPOSITION Admitted 02/26/2022 01:59:15 PM      PATIENT REFERRED TO:  No follow-up provider specified.     DISCHARGE MEDICATIONS:  New Prescriptions    No medications on file       Brenda Mike MD  Emergency Medicine Resident    (Please note that portions of thisnote were completed with a voice recognition program.  Efforts were made to edit the dictations but occasionally words are mis-transcribed.)       Brenda Mike MD  Resident  02/26/22 50 Gaetano Higgins MD  Resident  02/26/22 [id number]

## 2022-02-26 NOTE — H&P
[address]     Department of Internal Medicine - Staff Internal Medicine Teaching Service          ADMISSION NOTE/HISTORY AND PHYSICAL EXAMINATION   Date: 2/26/2022  Patient Name: Porsche Cardoso  Date of admission: 2/26/2022  9:07 AM  YOB: 1960  PCP: MARCI Michele CNP  History Obtained From:  patient    CHIEF COMPLAINT     Chief complaint: Shortness of breath    HISTORY OF PRESENTING ILLNESS     The patient is a pleasant 57 y.o. male presents with a chief complaint of shortness of breath last night suddenly. He has had a cough ongoing for sputum production whitish yellow-colored. He reported that his wife has been sick. He is not vaccinated with Covid. Patient has a PMH significant of CHFrEF (Echo 11/2021: LVEF 25-30%, grade III diastolic dysfunction), ischemic cardiomyopathy (S/p stress test 11/2021: intermediate risk- no stress induced ischemia; cath in 01/2018: multivessel CAD-  s/p NOHEMY- distal ramus and proximal LAD), and A.fib (on Toprolol 50 mg daily and Xarelto). He denies chest pain, palpitations, fever, chills, weakness, abdominal pain, nausea or vomiting. In the ER, patient was hypertensive /115, tachycardia 125, tachypnea 28, SpO2 90% on room air initially however he became hypoxic with static sats in the 80s and was placed on 6 L oxygen. On exam, patient is alert awake oriented x4, speaking in full sentences. Lung exam has diffuse wheezing bilaterally. On EKG, patient was in A. fib RVR with HR in the 120s. In the ER he received Cardizem 20 mg IV bolus that controlled rate. He also received lasix 20 mg IV and 2 duoneb breathing treatment. CXR showed no acute processes. Labs included, creatinine 1.57, pro-Arian 0.20, proBNP 723, CBC within normal limits, D-dimer 0.57, rhino/enterovirus PCR is positive. Covid test negative. Patient is admitted for acute hypoxic respiratory failure likely secondary to CHF exacerbation.      Review of Systems:  Review of Systems   Constitutional: Negative for appetite change, chills and fever. HENT: Negative for ear pain, nosebleeds and sore throat. Respiratory: Positive for cough and shortness of breath. Negative for wheezing. Cardiovascular: Negative for chest pain, palpitations and leg swelling. Gastrointestinal: Negative for abdominal pain, blood in stool, diarrhea, nausea and vomiting. Genitourinary: Negative for dysuria, hematuria and urgency. Musculoskeletal: Negative for back pain, gait problem and neck pain. Neurological: Negative for dizziness, seizures, syncope and headaches. Psychiatric/Behavioral: Negative for agitation, behavioral problems and confusion. All other systems reviewed and are negative. PAST MEDICAL HISTORY     Past Medical History:   Diagnosis Date    Atrial fibrillation ([facility] [address]     CAD (coronary artery disease)     CHF (congestive heart failure) ([facility]) 2/10/2015    EF 15-20%     Hyperlipidemia     Hypertension     S/P cardiac cath 01/23/2018       PAST SURGICAL HISTORY     Past Surgical History:   Procedure Laterality Date    ABLATION OF DYSRHYTHMIC FOCUS      CORONARY ANGIOPLASTY WITH STENT PLACEMENT  01/23/2018    X 2    HERNIA REPAIR      TONSILLECTOMY      VASECTOMY  [id number]       ALLERGIES     Codeine    MEDICATIONS PRIOR TO ADMISSION     Prior to Admission medications    Medication Sig Start Date End Date Taking? Authorizing Provider   rivaroxaban (XARELTO) 20 MG TABS tablet Take 20 mg by mouth Daily with supper   Yes Historical Provider, MD   lisinopril (PRINIVIL;ZESTRIL) 5 MG tablet Take 5 mg by mouth daily   Yes Historical Provider, MD   metoprolol succinate (TOPROL XL) 50 MG extended release tablet Take 50 mg by mouth daily 9/13/21  Yes Historical Provider, MD   nitroGLYCERIN (NITROSTAT) 0.4 MG SL tablet Place 0.4 mg under the tongue every 5 minutes as needed for Chest pain up to max of 3 total doses.  If no relief after 1 dose, call 911.    Historical Provider, MD       SOCIAL HISTORY     Tobacco: Current smoker  Alcohol: Social use  Illicits: Denies use  Occupation: N/A    FAMILY HISTORY     Family History   Problem Relation Age of Onset    Cancer Mother         breast metastatic    Heart Disease Father        PHYSICAL EXAM     Vitals: BP (!) 150/104   Pulse 115   Temp 98.4 °F (36.9 °C) (Oral)   Resp 26   Ht 5' 6\" (1.676 m)   Wt 177 lb 4.8 oz (80.4 kg)   SpO2 96%   BMI 28.62 kg/m²   Tmax: Temp (24hrs), Av.5 °F (36.9 °C), Min:98.2 °F (36.8 °C), Max:98.8 °F (37.1 °C)    Last Body weight:   Wt Readings from Last 3 Encounters:   22 177 lb 4.8 oz (80.4 kg)   10/13/21 176 lb (79.8 kg)   10/04/21 175 lb (79.4 kg)     Body Mass Index : Body mass index is 28.62 kg/m². PHYSICAL EXAMINATION:  Physical Exam  Vitals and nursing note reviewed. Constitutional:       General: He is not in acute distress. Appearance: He is not ill-appearing. HENT:      Mouth/Throat:      Pharynx: No oropharyngeal exudate or posterior oropharyngeal erythema. Cardiovascular:      Rate and Rhythm: Tachycardia present. Rhythm irregular. Pulses: Normal pulses. Heart sounds: Normal heart sounds. No murmur heard. No gallop. Pulmonary:      Effort: No respiratory distress. Breath sounds: Wheezing present. Abdominal:      General: Bowel sounds are normal. There is no distension. Palpations: Abdomen is soft. Tenderness: There is no abdominal tenderness. There is no guarding. Musculoskeletal:         General: Normal range of motion. Right lower leg: No edema. Left lower leg: No edema. Neurological:      Mental Status: He is alert and oriented to person, place, and time. Psychiatric:         Mood and Affect: Mood normal.         Behavior: Behavior normal.         Thought Content:  Thought content normal.            INVESTIGATIONS     Laboratory Testing:     Recent Results (from the past 24 hour(s))   CBC with Auto Differential    Collection Time: 02/26/22  9:19 AM   Result Value Ref Range    WBC 10.2 3.5 - 11.3 k/uL    RBC 4.88 4.21 - 5.77 m/uL    Hemoglobin 15.5 13.0 - 17.0 g/dL    Hematocrit 46.7 40.7 - 50.3 %    MCV 95.7 82.6 - 102.9 fL    MCH 31.8 25.2 - 33.5 pg    MCHC 33.2 28.4 - 34.8 g/dL    RDW 12.2 11.8 - 14.4 %    Platelets 280 488 - 829 k/uL    MPV 11.7 8.1 - 13.5 fL    NRBC Automated 0.0 0.0 per 100 WBC    Seg Neutrophils 73 (H) 36 - 65 %    Lymphocytes 13 (L) 24 - 43 %    Monocytes 9 3 - 12 %    Eosinophils % 3 1 - 4 %    Basophils 1 0 - 2 %    Immature Granulocytes 1 (H) 0 %    Segs Absolute 7.49 1.50 - 8.10 k/uL    Absolute Lymph # 1.30 1.10 - 3.70 k/uL    Absolute Mono # 0.95 0.10 - 1.20 k/uL    Absolute Eos # 0.34 0.00 - 0.44 k/uL    Basophils Absolute 0.07 0.00 - 0.20 k/uL    Absolute Immature Granulocyte 0.05 0.00 - 0.30 k/uL   Basic Metabolic Panel    Collection Time: 02/26/22  9:19 AM   Result Value Ref Range    Glucose 111 (H) 70 - 99 mg/dL    BUN 16 8 - 23 mg/dL    CREATININE 0.57 (L) 0.70 - 1.20 mg/dL    Calcium 9.1 8.6 - 10.4 mg/dL    Sodium 135 135 - 144 mmol/L    Potassium 4.2 3.7 - 5.3 mmol/L    Chloride 101 98 - 107 mmol/L    CO2 23 20 - 31 mmol/L    Anion Gap 11 9 - 17 mmol/L    GFR Non-African American >60 >60 mL/min    GFR African American >60 >60 mL/min    GFR Comment         Brain Natriuretic Peptide    Collection Time: 02/26/22  9:19 AM   Result Value Ref Range    Pro- (H) <300 pg/mL   Troponin    Collection Time: 02/26/22  9:19 AM   Result Value Ref Range    Troponin, High Sensitivity 15 0 - 22 ng/L   D-Dimer, Quantitative    Collection Time: 02/26/22  9:19 AM   Result Value Ref Range    D-Dimer, Quant 0.56 mg/L FEU   COVID-19, Rapid    Collection Time: 02/26/22  9:30 AM    Specimen: Nasopharyngeal Swab   Result Value Ref Range    Specimen Description . NASOPHARYNGEAL SWAB     SARS-CoV-2, Rapid Not Detected Not Detected   Troponin    Collection Time: 02/26/22 10:35 AM   Result Value Ref Range    Troponin, High Sensitivity 16 0 - 22 ng/L   Procalcitonin    Collection Time: 02/26/22 10:35 AM   Result Value Ref Range    Procalcitonin 0.20 (H) <0.09 ng/mL   TSH with Reflex    Collection Time: 02/26/22 10:35 AM   Result Value Ref Range    TSH 2.55 0.30 - 5.00 mIU/L   Procalcitonin    Collection Time: 02/26/22  3:32 PM   Result Value Ref Range    Procalcitonin 0.21 (H) <0.09 ng/mL   Respiratory Panel, Molecular, with COVID-19 (Restricted: peds pts or suitable admitted adults)    Collection Time: 02/26/22  5:36 PM    Specimen: Nasopharyngeal Swab   Result Value Ref Range    Specimen Description . NASOPHARYNGEAL SWAB     Adenovirus PCR Not Detected Not Detected    Coronavirus 229E PCR Not Detected Not Detected    Coronavirus HKU1 PCR Not Detected Not Detected    Coronavirus NL63 PCR Not Detected Not Detected    Coronavirus OC43 PCR Not Detected Not Detected    SARS-CoV-2, PCR Not Detected Not Detected    Human Metapneumovirus PCR Not Detected Not Detected    Rhino/Enterovirus PCR DETECTED (A) Not Detected    Influenza A by PCR Not Detected Not Detected    Influenza B by PCR Not Detected Not Detected    Parainfluenza 1 PCR Not Detected Not Detected    Parainfluenza 2 PCR Not Detected Not Detected    Parainfluenza 3 PCR Not Detected Not Detected    Parainfluenza 4 PCR Not Detected Not Detected    Resp Syncytial Virus PCR Not Detected Not Detected    Bordetella Parapertussis Not Detected Not Detected    B Pertussis by PCR Not Detected Not Detected    Chlamydia pneumoniae By PCR Not Detected Not Detected    Mycoplasma pneumo by PCR Not Detected Not Detected       Imaging:   XR CHEST PORTABLE    Result Date: 2/26/2022  No acute process. ASSESSMENT & PLAN     ASSESSMENT / PLAN:     IMPRESSION  This is a 57 y.o. male who presented with shortness of breath and found to have Acute hypoxic respiratory failure secondary to acute on chronic combined systolic and diastolic heart failure.       Principal Problem:    Acute on chronic combined systolic (congestive) and diastolic (congestive) heart failure (HCC)  Active Problems:    Atrial fibrillation (HCC)    CHF (congestive heart failure) ([address]    Upper respiratory infection, viral  Resolved Problems:    * No resolved hospital problems. *    1. Acute hypoxic respiratory failure secondary to acute on chronic combined systolic and diastolic heart failure (Echo 11/2021: LVEF 25-30%, grade III diastolic dysfunction)  - on 6L NC oxygen, saturating well  -Received Lasix 20 mg IV in the ER  -Monitor inputs and outputs  -We will reassess tomorrow for Lasix dosage  - Continue Symbicort twice daily and DuoNeb as needed for wheezing  -Supplemental oxygen to maintain SPO2 > 92%    2. Atrial Fibrillation with RVR (HR 120s)  - Received Cardizem 20 mg IV bolus that controlled HR. He also received lasix 20 mg IV and 2 duoneb breathing treatment  -Started on home meds: Toprol-XL 50 mg daily,    3. Upper Respiratory Infection  - Respiratory panel- positive for Rhino/Enterovirus  - Covid negative  - Supportive treatment    4. Ischemic cardiomyopathy (S/p stress test 11/2021: intermediate risk- no stress induced ischemia; cath in 01/2018: multivessel CAD-  s/p NOHEMY- distal ramus and proximal LAD  - Continue ASA, Lipitor  - Lipid profile in the am, will follow up     DVT ppx: On Xarelto  GI ppx: Not indicated    PT/OT: On board  Discharge Planning:  consulted, will follow up    Zita Crabtree MD  Internal Medicine Resident, PGY-1  [facility]; [place], [place]  2/26/2022, 11:42 PM  Attending Physician Statement  I have discussed the care of Onyvax and I have examined the patient myselft and taken ros and hpi , including pertinent history and exam findings,  with the resident. I have reviewed the key elements of all parts of the encounter with the resident.   I agree with the assessment, plan and orders as documented by the resident.       Electronically signed by Omi Chavez MD

## 2022-02-26 NOTE — RT PROTOCOL NOTE
JORGE BLANTON, [facility] No admission diagnoses are documented for this encounter. .   Vitals:    02/26/22 1146   BP: (!) 135/92   Pulse: 97   Resp: 12   Temp:    SpO2: 96%   . Patients home meds are   Prior to Admission medications    Medication Sig Start Date End Date Taking? Authorizing Provider   tamsulosin (FLOMAX) 0.4 MG capsule Take 1 capsule by mouth daily 10/13/21   MARCI Sorto NP   isosorbide mononitrate (IMDUR) 30 MG extended release tablet Take 30 mg by mouth daily 9/13/21   Historical Provider, MD   metoprolol succinate (TOPROL XL) 50 MG extended release tablet Take 50 mg by mouth daily 9/13/21   Historical Provider, MD   atorvastatin (LIPITOR) 20 MG tablet Take 20 mg by mouth daily 9/13/21   Historical Provider, MD   aspirin 81 MG EC tablet Take 81 mg by mouth daily    Historical Provider, MD   nitroGLYCERIN (NITROSTAT) 0.4 MG SL tablet Place 0.4 mg under the tongue every 5 minutes as needed for Chest pain up to max of 3 total doses. If no relief after 1 dose, call 911. Historical Provider, MD   apixaban (ELIQUIS) 5 MG TABS tablet Take 5 mg by mouth daily Indications: from cardio     Historical Provider, MD   .  Assessment     Pt presents to the er for sob and a cough. Pt in triage has o2 saturation of 90%. On room arrival pt o2 sat found to be 83% on RA. Pt placed on 6L via NC and new o2 sat of 96%. Pt states that he has a hx of a-fib and is not rate controlled    Patient denies history of copd or asthma. Non smoker. Does have URI symptoms.        RR 24    Breath Sounds: wheeze      Bronchodilator assessment at level  3  Hyperinflation assessment at level   Secretion Management assessment at level      [x]    Bronchodilator Assessment  BRONCHODILATOR ASSESSMENT SCORE  Score 0 1 2 3 4 5   Breath Sounds   []  Patient Baseline []  No Wheeze good aeration []  Faint, scattered wheezing, good aeration [x]  Expiratory Wheezing and or moderately diminished []  Insp/Exp wheeze and/or very diminished []  Insp/Exp and/ or marked distress   Respiratory Rate   []  Patient Baseline []  Less than 20 []  Less than 20 [x]  20-25 []  Greater than 25 []  Greater than 25   Peak flow % of Pred or PB []  NA   []  Greater than 90%  []  81-90% []  71-80% []  Less than or equal to 70%  or unable to perform []  Unable due to Respiratory Distress   Dyspnea re []  Patient Baseline []  No SOB []  No SOB [x]  SOB on exertion []  SOB min activity []  At rest/acute   e FEV% Predicted       [x]  NA []  Above 69%  []  Unable []  Above 60-69%  []  Unable []  Above 50-59%  []  Unable []  Above 35-49%  []  Unable []  Less than 35%  []  Unable                   JORGE BLANTON RCP  12:25 PM

## 2022-02-26 NOTE — PLAN OF CARE
Problem: Falls - Risk of:  Goal: Will remain free from falls  Description: Will remain free from falls  Outcome: Ongoing  Goal: Absence of physical injury  Description: Absence of physical injury  Outcome: Ongoing     Problem: Discharge Planning:  Goal: Participates in care planning  Description: Participates in care planning  Outcome: Ongoing  Goal: Discharged to appropriate level of care  Description: Discharged to appropriate level of care  Outcome: Ongoing     Problem:  Activity Intolerance:  Goal: Ability to tolerate increased activity will improve  Description: Ability to tolerate increased activity will improve  Outcome: Ongoing     Problem: Anxiety/Stress:  Goal: Level of anxiety will decrease  Description: Level of anxiety will decrease  Outcome: Ongoing     Problem: Nutrition Deficit:  Goal: Ability to achieve adequate nutritional intake will improve  Description: Ability to achieve adequate nutritional intake will improve  Outcome: Ongoing     Problem: Pain:  Goal: Pain level will decrease  Description: Pain level will decrease  Outcome: Ongoing  Goal: Ability to notify healthcare provider of pain before it becomes unmanageable or unbearable will improve  Description: Ability to notify healthcare provider of pain before it becomes unmanageable or unbearable will improve  Outcome: Ongoing  Goal: Control of acute pain  Description: Control of acute pain  Outcome: Ongoing  Goal: Control of chronic pain  Description: Control of chronic pain  Outcome: Ongoing     Problem: Serum Glucose Level - Abnormal:  Goal: Ability to maintain appropriate glucose levels will improve to within specified parameters  Description: Ability to maintain appropriate glucose levels will improve to within specified parameters  Outcome: Ongoing     Problem: Skin Integrity - Impaired:  Goal: Will show no infection signs and symptoms  Description: Will show no infection signs and symptoms  Outcome: Ongoing  Goal: Absence of new skin

## 2022-02-26 NOTE — ED PROVIDER NOTES
[facility]  Emergency Department  Faculty Attestation     I performed a history and physical examination of the patient and discussed management with the resident. I reviewed the residents note and agree with the documented findings and plan of care. Any areas of disagreement are noted on the chart. I was personally present for the key portions of any procedures. I have documented in the chart those procedures where I was not present during the key portions. I have reviewed the emergency nurses triage note. I agree with the chief complaint, past medical history, past surgical history, allergies, medications, social and family history as documented unless otherwise noted below. For Physician Assistant/ Nurse Practitioner cases/documentation I have personally evaluated this patient and have completed at least one if not all key elements of the E/M (history, physical exam, and MDM). Additional findings are as noted.       Primary Care Physician:  MARCI Petty CNP    Screenings:  [unfilled]    CHIEF COMPLAINT       Chief Complaint   Patient presents with    Shortness of Breath     started today, cough started yesterday       RECENT VITALS:   Temp: 98.2 °F (36.8 °C),  Pulse: 99, Resp: 24, BP: (!) 159/99    LABS:  Labs Reviewed   CBC WITH AUTO DIFFERENTIAL - Abnormal; Notable for the following components:       Result Value    Seg Neutrophils 73 (*)     Lymphocytes 13 (*)     Immature Granulocytes 1 (*)     All other components within normal limits   BASIC METABOLIC PANEL - Abnormal; Notable for the following components:    Glucose 111 (*)     CREATININE 0.57 (*)     All other components within normal limits   COVID-19, RAPID   TROPONIN   D-DIMER, QUANTITATIVE   BRAIN NATRIURETIC PEPTIDE       Radiology  XR CHEST PORTABLE    (Results Pending)       CRITICAL CARE: There was a high probability of clinically significant/life threatening deterioration in this patient's condition which required my urgent intervention. Total critical care time was none minutes. This excludes any time for separately reportable procedures. EKG:   EKG Interpretation    Interpreted by me    Rhythm: Atrial fibrillation  Rate: Tachycardic  Axis: normal  Ectopy: none  Conduction: normal  ST Segments: no acute change  T Waves: no acute change  Q Waves: none    Clinical Impression: Rapid atrial fibrillation without acute ST or T wave changes    Attending Physician Additional  Notes    Patient has shortness of breath coughing and wheezing. No history of asthma. He has clear sputum production. No hemoptysis. No chest pain. No fevers chills or sweats. No myalgias arthralgias sore throat rhinorrhea. No leg pain calf swelling or mobility. He has a history of atrial fibrillation and CHF. Has been compliant with his medications. On exam he is dyspneic, tachypneic, tachycardic and hypertensive with atrial fibrillation approximately 120 and blood pressure 526 systolic. Saturations are 80% on room air, 96% on supplemental oxygen. Breath sounds however diminished bilaterally with expiratory prolongation and mild diffuse wheezing. He does have JVD. No obvious gallop. No edema cords Homans calf tenderness. Abdomen is benign. Skin is warm and dry with normal capillary refill. No pallor. GCS is 15. Normal speech mentation memory and pupils. Impression is dyspnea consider CHF exacerbation versus bronchospasm rule out pneumonia, less likely pulmonary embolism or other cause. Plan is chest x-ray, aerosols, diuretic, laboratory studies, cardiac monitoring, AV hunter blockade probably with Cardizem, reassess. Leticia Perez.  Franklin Drake MD, [address]  Attending Emergency  Physician               Jenna Melendez MD  02/26/22 [id number]

## 2022-02-27 ENCOUNTER — APPOINTMENT (OUTPATIENT)
Dept: GENERAL RADIOLOGY | Age: 62
DRG: 291 | End: 2022-02-27
Payer: COMMERCIAL

## 2022-02-27 ENCOUNTER — APPOINTMENT (OUTPATIENT)
Dept: CT IMAGING | Age: 62
DRG: 291 | End: 2022-02-27
Payer: COMMERCIAL

## 2022-02-27 LAB
ABSOLUTE EOS #: 0.09 K/UL (ref 0–0.4)
ABSOLUTE IMMATURE GRANULOCYTE: 0 K/UL (ref 0–0.3)
ABSOLUTE LYMPH #: 0.45 K/UL (ref 1–4.8)
ABSOLUTE MONO #: 0.62 K/UL (ref 0.1–0.8)
ANION GAP SERPL CALCULATED.3IONS-SCNC: 12 MMOL/L (ref 9–17)
BASOPHILS # BLD: 0 % (ref 0–2)
BASOPHILS ABSOLUTE: 0 K/UL (ref 0–0.2)
BUN BLDV-MCNC: 13 MG/DL (ref 8–23)
CALCIUM SERPL-MCNC: 9.2 MG/DL (ref 8.6–10.4)
CHLORIDE BLD-SCNC: 104 MMOL/L (ref 98–107)
CHOLESTEROL/HDL RATIO: 3.4
CHOLESTEROL: 206 MG/DL
CO2: 24 MMOL/L (ref 20–31)
CREAT SERPL-MCNC: 0.61 MG/DL (ref 0.7–1.2)
EOSINOPHILS RELATIVE PERCENT: 1 % (ref 1–4)
GFR AFRICAN AMERICAN: >60 ML/MIN
GFR NON-AFRICAN AMERICAN: >60 ML/MIN
GFR SERPL CREATININE-BSD FRML MDRD: ABNORMAL ML/MIN/{1.73_M2}
GLUCOSE BLD-MCNC: 144 MG/DL (ref 70–99)
HCT VFR BLD CALC: 43.5 % (ref 40.7–50.3)
HDLC SERPL-MCNC: 61 MG/DL
HEMOGLOBIN: 14.6 G/DL (ref 13–17)
IMMATURE GRANULOCYTES: 0 %
LDL CHOLESTEROL: 134 MG/DL (ref 0–130)
LYMPHOCYTES # BLD: 5 % (ref 24–44)
MAGNESIUM: 1.9 MG/DL (ref 1.6–2.6)
MCH RBC QN AUTO: 31.9 PG (ref 25.2–33.5)
MCHC RBC AUTO-ENTMCNC: 33.6 G/DL (ref 28.4–34.8)
MCV RBC AUTO: 95 FL (ref 82.6–102.9)
MONOCYTES # BLD: 7 % (ref 1–7)
MORPHOLOGY: NORMAL
NRBC AUTOMATED: 0 PER 100 WBC
PDW BLD-RTO: 12.2 % (ref 11.8–14.4)
PLATELET # BLD: 192 K/UL (ref 138–453)
PMV BLD AUTO: 11.7 FL (ref 8.1–13.5)
POTASSIUM SERPL-SCNC: 4.3 MMOL/L (ref 3.7–5.3)
RBC # BLD: 4.58 M/UL (ref 4.21–5.77)
SEG NEUTROPHILS: 87 % (ref 36–66)
SEGMENTED NEUTROPHILS ABSOLUTE COUNT: 7.74 K/UL (ref 1.8–7.7)
SODIUM BLD-SCNC: 140 MMOL/L (ref 135–144)
TRIGL SERPL-MCNC: 56 MG/DL
WBC # BLD: 8.9 K/UL (ref 3.5–11.3)

## 2022-02-27 PROCEDURE — 94664 DEMO&/EVAL PT USE INHALER: CPT

## 2022-02-27 PROCEDURE — 36415 COLL VENOUS BLD VENIPUNCTURE: CPT

## 2022-02-27 PROCEDURE — 71045 X-RAY EXAM CHEST 1 VIEW: CPT

## 2022-02-27 PROCEDURE — 94761 N-INVAS EAR/PLS OXIMETRY MLT: CPT

## 2022-02-27 PROCEDURE — 80048 BASIC METABOLIC PNL TOTAL CA: CPT

## 2022-02-27 PROCEDURE — 6360000002 HC RX W HCPCS: Performed by: STUDENT IN AN ORGANIZED HEALTH CARE EDUCATION/TRAINING PROGRAM

## 2022-02-27 PROCEDURE — 6360000004 HC RX CONTRAST MEDICATION: Performed by: STUDENT IN AN ORGANIZED HEALTH CARE EDUCATION/TRAINING PROGRAM

## 2022-02-27 PROCEDURE — 94640 AIRWAY INHALATION TREATMENT: CPT

## 2022-02-27 PROCEDURE — 80061 LIPID PANEL: CPT

## 2022-02-27 PROCEDURE — 85025 COMPLETE CBC W/AUTO DIFF WBC: CPT

## 2022-02-27 PROCEDURE — 6370000000 HC RX 637 (ALT 250 FOR IP): Performed by: STUDENT IN AN ORGANIZED HEALTH CARE EDUCATION/TRAINING PROGRAM

## 2022-02-27 PROCEDURE — 6370000000 HC RX 637 (ALT 250 FOR IP)

## 2022-02-27 PROCEDURE — 71260 CT THORAX DX C+: CPT

## 2022-02-27 PROCEDURE — 2580000003 HC RX 258: Performed by: STUDENT IN AN ORGANIZED HEALTH CARE EDUCATION/TRAINING PROGRAM

## 2022-02-27 PROCEDURE — 2060000000 HC ICU INTERMEDIATE R&B

## 2022-02-27 PROCEDURE — 2700000000 HC OXYGEN THERAPY PER DAY

## 2022-02-27 RX ORDER — METHYLPREDNISOLONE SODIUM SUCCINATE 40 MG/ML
40 INJECTION, POWDER, LYOPHILIZED, FOR SOLUTION INTRAMUSCULAR; INTRAVENOUS DAILY
Status: CANCELLED | OUTPATIENT
Start: 2022-02-28 | End: 2022-03-04

## 2022-02-27 RX ORDER — ACETYLCYSTEINE 200 MG/ML
600 SOLUTION ORAL; RESPIRATORY (INHALATION) 4 TIMES DAILY
Status: DISCONTINUED | OUTPATIENT
Start: 2022-02-28 | End: 2022-03-01 | Stop reason: HOSPADM

## 2022-02-27 RX ORDER — METHYLPREDNISOLONE SODIUM SUCCINATE 40 MG/ML
40 INJECTION, POWDER, LYOPHILIZED, FOR SOLUTION INTRAMUSCULAR; INTRAVENOUS DAILY
Status: DISCONTINUED | OUTPATIENT
Start: 2022-02-27 | End: 2022-02-28

## 2022-02-27 RX ORDER — ALBUTEROL SULFATE 2.5 MG/3ML
2.5 SOLUTION RESPIRATORY (INHALATION) 4 TIMES DAILY
Status: DISCONTINUED | OUTPATIENT
Start: 2022-02-28 | End: 2022-03-01 | Stop reason: HOSPADM

## 2022-02-27 RX ORDER — ATORVASTATIN CALCIUM 40 MG/1
40 TABLET, FILM COATED ORAL NIGHTLY
Status: DISCONTINUED | OUTPATIENT
Start: 2022-02-27 | End: 2022-03-01 | Stop reason: HOSPADM

## 2022-02-27 RX ADMIN — ASPIRIN 81 MG: 81 TABLET, CHEWABLE ORAL at 10:35

## 2022-02-27 RX ADMIN — RIVAROXABAN 20 MG: 20 TABLET, FILM COATED ORAL at 10:35

## 2022-02-27 RX ADMIN — ALBUTEROL SULFATE 2.5 MG: 2.5 SOLUTION RESPIRATORY (INHALATION) at 11:58

## 2022-02-27 RX ADMIN — ALBUTEROL SULFATE 2.5 MG: 2.5 SOLUTION RESPIRATORY (INHALATION) at 19:57

## 2022-02-27 RX ADMIN — SODIUM CHLORIDE, PRESERVATIVE FREE 10 ML: 5 INJECTION INTRAVENOUS at 20:58

## 2022-02-27 RX ADMIN — BUDESONIDE AND FORMOTEROL FUMARATE DIHYDRATE 2 PUFF: 80; 4.5 AEROSOL RESPIRATORY (INHALATION) at 19:57

## 2022-02-27 RX ADMIN — DESMOPRESSIN ACETATE 40 MG: 0.2 TABLET ORAL at 20:58

## 2022-02-27 RX ADMIN — IOPAMIDOL 75 ML: 755 INJECTION, SOLUTION INTRAVENOUS at 15:13

## 2022-02-27 RX ADMIN — METHYLPREDNISOLONE SODIUM SUCCINATE 40 MG: 40 INJECTION, POWDER, FOR SOLUTION INTRAMUSCULAR; INTRAVENOUS at 10:36

## 2022-02-27 RX ADMIN — BUDESONIDE AND FORMOTEROL FUMARATE DIHYDRATE 2 PUFF: 80; 4.5 AEROSOL RESPIRATORY (INHALATION) at 09:09

## 2022-02-27 RX ADMIN — ALBUTEROL SULFATE 2.5 MG: 2.5 SOLUTION RESPIRATORY (INHALATION) at 16:00

## 2022-02-27 RX ADMIN — ALBUTEROL SULFATE 2.5 MG: 2.5 SOLUTION RESPIRATORY (INHALATION) at 09:08

## 2022-02-27 RX ADMIN — METOPROLOL SUCCINATE 50 MG: 50 TABLET, FILM COATED, EXTENDED RELEASE ORAL at 10:35

## 2022-02-27 RX ADMIN — SODIUM CHLORIDE, PRESERVATIVE FREE 10 ML: 5 INJECTION INTRAVENOUS at 10:40

## 2022-02-27 NOTE — CARE COORDINATION
Case Management Initial Discharge Plan  [address],             Met with:patient to discuss discharge plans. Information verified: address, contacts, phone number, , insurance Yes  Insurance Provider: [address] [place] [address]    Emergency Contact/Next of Kin name & number: Shawn PULIDO/MOCQBA/[phone]  Who are involved in patient's support system? family    PCP: MARCI Sullivan CNP  Date of last visit: 1 month ago      Discharge Planning    Living Arrangements:  Spouse/Significant Other     Home has 2 stories  3 stairs to climb to get into front door, 10 stairs to climb to reach second floor  Location of bedroom/bathroom in home 2nd floor    Patient able to perform ADL's:Independent    Current Services (outpatient & in home) N/A  DME equipment: N/A  DME provider:     Is patient receiving oral anticoagulation therapy? Yes Willie Quick    If indicated:   Physician managing anticoagulation treatment: Roslyn Tate  Where does patient obtain lab work for [employer] treatment? [facility]      Potential Assistance Needed:  N/A    Patient agreeable to home care: No  Mason of choice provided:  n/a    Prior SNF/Rehab Placement and Facility: N/A  Agreeable to SNF/Rehab: No  Mason of choice provided: n/a     Evaluation: no    Expected Discharge date:       Patient expects to be discharged to: If home: is the family and/or caregiver wiling & able to provide support at home? Yes but pt states that he is independent  Who will be providing this support? wife*    Follow Up Appointment: Best Day/ Time:      Transportation provider: Pt drives  Transportation arrangements needed for discharge: No wife will transport home    Readmission Risk              Risk of Unplanned Readmission:  10             Does patient have a readmission risk score greater than 14?: No  If yes, follow-up appointment must be made within 7 days of discharge.      Goals of Care:       Educated Pt on transitional options, provided freedom of choice and are agreeable with plan      Discharge Plan: Pt will discharge home independently with his wife          Electronically signed by FRED Galdamez on 2/27/22 at 10:19 AM EST

## 2022-02-27 NOTE — PROGRESS NOTES
[facility]  Internal Medicine Teaching Residency Program  Inpatient Daily Progress Note  ______________________________________________________________________________    Patient: Isa Steinberg  YOB: 1960   MDW:[id number]    Acct: [de-identified]     Room: [address] [place] [address]  Admit date: 2/26/2022  Today's date: 02/27/22  Number of days in the hospital: 1    SUBJECTIVE   Admitting Diagnosis: Acute on chronic combined systolic (congestive) and diastolic (congestive) heart failure (HCC)     CC: Shortness of breath    Pt examined at bedside. Chart & results reviewed. No acute events overnight. Patient remained afebrile and hemodynamically stable. Uout: 1400 mL since admission  - He reported improvement with shortness of breath, chest pain, wheezing. He denies palpitations, nausea, vomiting, fever or chills. Positive for Rhinovirus/Enterovirus PCR  -Started Solu-Medrol 40 mg IV for 5 days. /67; HR 83; RR 15; SpO2 98% on 6L NC   Labs reviewed: Cr 0.61; , Cholesterol 206;     ROS:  Constitutional:  negative for chills, fevers, sweats  Respiratory:  negative for cough, dyspnea on exertion, hemoptysis, shortness of breath, wheezing  Cardiovascular:  negative for chest pain, chest pressure/discomfort, lower extremity edema, palpitations  Gastrointestinal:  negative for abdominal pain, constipation, diarrhea, nausea, vomiting  Neurological:  negative for dizziness, headache    BRIEF HISTORY     The patient is a pleasant 57 y.o. male presents with a chief complaint of shortness of breath last night suddenly. He has had a cough ongoing for sputum production whitish yellow-colored. He reported that his wife has been sick. He is not vaccinated with Covid.  Patient has a PMH significant of CHFrEF (Echo 11/2021: LVEF 25-30%, grade III diastolic dysfunction), ischemic cardiomyopathy (S/p stress test 11/2021: intermediate risk- no stress induced ischemia; cath in results for input(s): PROTIME, INR in the last 72 hours. APTT: No results for input(s): APTT in the last 72 hours. CARDIAC ENZYMES: No results for input(s): CKMB, CKMBINDEX, TROPONINI in the last 72 hours. Invalid input(s): CKTOTAL;3  FASTING LIPID PANEL:  Lab Results   Component Value Date    CHOL 206 (H) 02/27/2022    HDL 61 02/27/2022    TRIG 56 02/27/2022     LIVER PROFILE: No results for input(s): AST, ALT, ALB, BILIDIR, BILITOT, ALKPHOS in the last 72 hours. MICROBIOLOGY: No results found for: CULTURE    Imaging:    XR CHEST PORTABLE    Result Date: 2/26/2022  No acute process. ASSESSMENT & PLAN     ASSESSMENT / PLAN:     Principal Problem:    Acute on chronic combined systolic (congestive) and diastolic (congestive) heart failure (HCC)  Active Problems:    Atrial fibrillation (HCC)    CHF (congestive heart failure) (HCC)    Upper respiratory infection, viral  Resolved Problems:    * No resolved hospital problems. *    IMPRESSION  This is a 57 y.o. male who presented with shortness of breath and found to have Acute hypoxic respiratory failure secondary to acute on chronic combined systolic and diastolic heart failure. 1. Acute hypoxic respiratory failure secondary to acute on chronic combined systolic and diastolic heart failure (Echo 11/2021: LVEF 25-30%, grade III diastolic dysfunction)  - on 6L NC oxygen, saturating well  - Received Lasix 20 mg IV in the ER  - Monitor inputs and outputs  - will start diuresis today  - Continue Symbicort twice daily and DuoNeb as needed for wheezing  -Supplemental oxygen to maintain SPO2 > 92%  - Wean off oxygen as tolerated     2. Atrial Fibrillation with RVR (HR 120s)  - Received Cardizem 20 mg IV bolus that controlled HR.   He also received lasix 20 mg IV and 2 duoneb breathing treatment  -Started on home meds: Toprol-XL 50 mg daily,     3. Upper Respiratory Infection  - Respiratory panel- positive for Rhino/Enterovirus  - Covid negative  - Supportive treatment     4. Ischemic cardiomyopathy (S/p stress test 11/2021: intermediate risk- no stress induced ischemia; cath in 01/2018: multivessel CAD-  s/p NOHEMY- distal ramus and proximal LAD  - Continue ASA, Lipitor  - Lipid profile - , Cholesterol 206; Lipitor dosage increased to 40 mg nightly     DVT ppx: On Xarelto  GI ppx: Not indicated     PT/OT: On board  Discharge Planning:  consulted, will follow up    Melanie Whitfield MD  Internal Medicine Resident, PGY-1  [address], [place]  2/27/2022, 7:40 AM  Attending Physician Statement  I have discussed the care of HoaClearRisk Cassi and I have examined the patient myselft and taken ros and hpi , including pertinent history and exam findings,  with the resident. I have reviewed the key elements of all parts of the encounter with the resident. I agree with the assessment, plan and orders as documented by the resident.   CT reviewed   No PE  Partial Lower lobe collapse, get pulm on board     Electronically signed by Karen Donato MD

## 2022-02-27 NOTE — PLAN OF CARE
Problem: RESPIRATORY  Intervention: Nebulizer management  Note: BRONCHOSPASM/BRONCHOCONSTRICTION     [x]         IMPROVE AERATION/BREATH SOUNDS  [x]   ADMINISTER BRONCHODILATOR THERAPY AS APPROPRIATE  [x]   ASSESS BREATH SOUNDS  [x]   IMPLEMENT AEROSOL/MDI PROTOCOL  [x]   PATIENT EDUCATION AS NEEDED

## 2022-02-27 NOTE — PROGRESS NOTES
Tamra Wheeler, PPatient Assessment complete. Shortness of breath [R06.02]  Hypoxia [R09.02]  Acute on chronic combined systolic (congestive) and diastolic (congestive) heart failure ([facility] [address] [I50.43] . Vitals:    02/27/22 1430   BP: 129/85   Pulse: 107   Resp: 17   Temp: 98 °F (36.7 °C)   SpO2: 95%   . Patients home meds are   Prior to Admission medications    Medication Sig Start Date End Date Taking? Authorizing Provider   rivaroxaban (XARELTO) 20 MG TABS tablet Take 20 mg by mouth Daily with supper   Yes Historical Provider, MD   lisinopril (PRINIVIL;ZESTRIL) 5 MG tablet Take 5 mg by mouth daily   Yes Historical Provider, MD   metoprolol succinate (TOPROL XL) 50 MG extended release tablet Take 50 mg by mouth daily 9/13/21  Yes Historical Provider, MD   nitroGLYCERIN (NITROSTAT) 0.4 MG SL tablet Place 0.4 mg under the tongue every 5 minutes as needed for Chest pain up to max of 3 total doses. If no relief after 1 dose, call 911.     Historical Provider, MD   .        RR 16  Breath Sounds: clear, diminished in the bases      Bronchodilator assessment at level  3  Hyperinflation assessment at level   Secretion Management assessment at level      []    Bronchodilator Assessment  BRONCHODILATOR ASSESSMENT SCORE  Score 0 1 2 3 4 5   Breath Sounds   []  Patient Baseline []  No Wheeze good aeration [x]  Faint, scattered wheezing, good aeration []  Expiratory Wheezing and or moderately diminished []  Insp/Exp wheeze and/or very diminished []  Insp/Exp and/ or marked distress   Respiratory Rate   []  Patient Baseline [x]  Less than 20 [x]  Less than 20 []  20-25 []  Greater than 25 []  Greater than 25   Peak flow % of Pred or PB []  NA   []  Greater than 90%  []  81-90% []  71-80% []  Less than or equal to 70%  or unable to perform []  Unable due to Respiratory Distress   Dyspnea re []  Patient Baseline []  No SOB []  No SOB [x]  SOB on exertion []  SOB min activity []  At rest/acute   e FEV% Predicted       []  NA []  Above 69%  []  Unable []  Above 60-69%  []  Unable []  Above 50-59%  []  Unable []  Above 35-49%  []  Unable []  Less than 35%  []  Unable                 []  Hyperinflation Assessment  Score 1 2 3   CXR and Breath Sounds   [x]  Clear []  No atelectasis  Basilar aeration []  Atelectasis or absent basilar breath sounds   Incentive Spirometry Volume  (Per IBW)   []  Greater than or equal to 15ml/Kg []  less than 15ml/Kg []  less than 15ml/Kg   Surgery within last 2 weeks [x]  None or general   []  Abdominal or thoracic surgery  []  Abdominal or thoracic   Chronic Pulmonary Historyre [x]  No []  Yes []  Yes     []  Secretion Management Assessment  Score 1 2 3   Bilateral Breath Sounds   []  Occasional Rhonchi []  Scattered Rhonchi []  Course Rhonchi and/or poor aeration   Sputum    []  Small amount of thin secretions []  Moderate amount of viscous secretions []  Copius, Viscious Yellow/ Secretions   CXR as reported by physician []  clear  []  Unavailable []  Infiltrates and/or consolidation  []  Unavailable []  Mucus Plugging and or lobar consolidation  []  Unavailable   Cough [x]  Strong, productive cough []  Weak productive cough []  No cough or weak non-productive cough   Carline Khan RCP  6:18 PM

## 2022-02-28 LAB
ABSOLUTE EOS #: 0 K/UL (ref 0–0.44)
ABSOLUTE IMMATURE GRANULOCYTE: 0 K/UL (ref 0–0.3)
ABSOLUTE LYMPH #: 0.63 K/UL (ref 1.1–3.7)
ABSOLUTE MONO #: 0.54 K/UL (ref 0.1–1.2)
ANION GAP SERPL CALCULATED.3IONS-SCNC: 12 MMOL/L (ref 9–17)
ANION GAP SERPL CALCULATED.3IONS-SCNC: 12 MMOL/L (ref 9–17)
BASOPHILS # BLD: 0 % (ref 0–2)
BASOPHILS ABSOLUTE: 0 K/UL (ref 0–0.2)
BUN BLDV-MCNC: 19 MG/DL (ref 8–23)
BUN BLDV-MCNC: 22 MG/DL (ref 8–23)
CALCIUM SERPL-MCNC: 9.4 MG/DL (ref 8.6–10.4)
CALCIUM SERPL-MCNC: 9.5 MG/DL (ref 8.6–10.4)
CHLORIDE BLD-SCNC: 103 MMOL/L (ref 98–107)
CHLORIDE BLD-SCNC: 98 MMOL/L (ref 98–107)
CO2: 23 MMOL/L (ref 20–31)
CO2: 24 MMOL/L (ref 20–31)
CREAT SERPL-MCNC: 0.61 MG/DL (ref 0.7–1.2)
CREAT SERPL-MCNC: 0.69 MG/DL (ref 0.7–1.2)
EOSINOPHILS RELATIVE PERCENT: 0 % (ref 1–4)
GFR AFRICAN AMERICAN: >60 ML/MIN
GFR AFRICAN AMERICAN: >60 ML/MIN
GFR NON-AFRICAN AMERICAN: >60 ML/MIN
GFR NON-AFRICAN AMERICAN: >60 ML/MIN
GFR SERPL CREATININE-BSD FRML MDRD: ABNORMAL ML/MIN/{1.73_M2}
GFR SERPL CREATININE-BSD FRML MDRD: ABNORMAL ML/MIN/{1.73_M2}
GLUCOSE BLD-MCNC: 142 MG/DL (ref 70–99)
GLUCOSE BLD-MCNC: 296 MG/DL (ref 70–99)
HCT VFR BLD CALC: 43.3 % (ref 40.7–50.3)
HEMOGLOBIN: 14.6 G/DL (ref 13–17)
IMMATURE GRANULOCYTES: 0 %
LYMPHOCYTES # BLD: 7 % (ref 24–43)
MCH RBC QN AUTO: 32.3 PG (ref 25.2–33.5)
MCHC RBC AUTO-ENTMCNC: 33.7 G/DL (ref 28.4–34.8)
MCV RBC AUTO: 95.8 FL (ref 82.6–102.9)
MONOCYTES # BLD: 6 % (ref 3–12)
MORPHOLOGY: NORMAL
NRBC AUTOMATED: 0 PER 100 WBC
PDW BLD-RTO: 12.4 % (ref 11.8–14.4)
PLATELET # BLD: 214 K/UL (ref 138–453)
PMV BLD AUTO: 11.5 FL (ref 8.1–13.5)
POTASSIUM SERPL-SCNC: 4.4 MMOL/L (ref 3.7–5.3)
POTASSIUM SERPL-SCNC: 4.8 MMOL/L (ref 3.7–5.3)
RBC # BLD: 4.52 M/UL (ref 4.21–5.77)
SEG NEUTROPHILS: 87 % (ref 36–65)
SEGMENTED NEUTROPHILS ABSOLUTE COUNT: 7.83 K/UL (ref 1.5–8.1)
SODIUM BLD-SCNC: 133 MMOL/L (ref 135–144)
SODIUM BLD-SCNC: 139 MMOL/L (ref 135–144)
WBC # BLD: 9 K/UL (ref 3.5–11.3)

## 2022-02-28 PROCEDURE — 99254 IP/OBS CNSLTJ NEW/EST MOD 60: CPT | Performed by: INTERNAL MEDICINE

## 2022-02-28 PROCEDURE — 94761 N-INVAS EAR/PLS OXIMETRY MLT: CPT

## 2022-02-28 PROCEDURE — 97530 THERAPEUTIC ACTIVITIES: CPT

## 2022-02-28 PROCEDURE — 94640 AIRWAY INHALATION TREATMENT: CPT

## 2022-02-28 PROCEDURE — 97166 OT EVAL MOD COMPLEX 45 MIN: CPT

## 2022-02-28 PROCEDURE — 99233 SBSQ HOSP IP/OBS HIGH 50: CPT | Performed by: INTERNAL MEDICINE

## 2022-02-28 PROCEDURE — 97535 SELF CARE MNGMENT TRAINING: CPT

## 2022-02-28 PROCEDURE — 6360000002 HC RX W HCPCS: Performed by: STUDENT IN AN ORGANIZED HEALTH CARE EDUCATION/TRAINING PROGRAM

## 2022-02-28 PROCEDURE — 6370000000 HC RX 637 (ALT 250 FOR IP): Performed by: STUDENT IN AN ORGANIZED HEALTH CARE EDUCATION/TRAINING PROGRAM

## 2022-02-28 PROCEDURE — 6370000000 HC RX 637 (ALT 250 FOR IP)

## 2022-02-28 PROCEDURE — 36415 COLL VENOUS BLD VENIPUNCTURE: CPT

## 2022-02-28 PROCEDURE — 6360000002 HC RX W HCPCS: Performed by: INTERNAL MEDICINE

## 2022-02-28 PROCEDURE — 2060000000 HC ICU INTERMEDIATE R&B

## 2022-02-28 PROCEDURE — 85025 COMPLETE CBC W/AUTO DIFF WBC: CPT

## 2022-02-28 PROCEDURE — 2700000000 HC OXYGEN THERAPY PER DAY

## 2022-02-28 PROCEDURE — 97161 PT EVAL LOW COMPLEX 20 MIN: CPT

## 2022-02-28 PROCEDURE — 2580000003 HC RX 258: Performed by: STUDENT IN AN ORGANIZED HEALTH CARE EDUCATION/TRAINING PROGRAM

## 2022-02-28 PROCEDURE — 80048 BASIC METABOLIC PNL TOTAL CA: CPT

## 2022-02-28 RX ORDER — METHYLPREDNISOLONE SODIUM SUCCINATE 40 MG/ML
40 INJECTION, POWDER, LYOPHILIZED, FOR SOLUTION INTRAMUSCULAR; INTRAVENOUS EVERY 12 HOURS
Status: DISCONTINUED | OUTPATIENT
Start: 2022-02-28 | End: 2022-03-01

## 2022-02-28 RX ORDER — LEVOFLOXACIN 5 MG/ML
750 INJECTION, SOLUTION INTRAVENOUS EVERY 24 HOURS
Status: DISCONTINUED | OUTPATIENT
Start: 2022-02-28 | End: 2022-03-01 | Stop reason: HOSPADM

## 2022-02-28 RX ORDER — DIGOXIN 0.25 MG/ML
125 INJECTION INTRAMUSCULAR; INTRAVENOUS EVERY 6 HOURS
Status: COMPLETED | OUTPATIENT
Start: 2022-02-28 | End: 2022-03-01

## 2022-02-28 RX ORDER — DIGOXIN 0.25 MG/ML
250 INJECTION INTRAMUSCULAR; INTRAVENOUS ONCE
Status: COMPLETED | OUTPATIENT
Start: 2022-02-28 | End: 2022-02-28

## 2022-02-28 RX ORDER — FUROSEMIDE 10 MG/ML
20 INJECTION INTRAMUSCULAR; INTRAVENOUS DAILY
Status: DISCONTINUED | OUTPATIENT
Start: 2022-02-28 | End: 2022-03-01

## 2022-02-28 RX ORDER — FUROSEMIDE 10 MG/ML
20 INJECTION INTRAMUSCULAR; INTRAVENOUS 2 TIMES DAILY
Status: DISCONTINUED | OUTPATIENT
Start: 2022-02-28 | End: 2022-02-28

## 2022-02-28 RX ORDER — POTASSIUM CHLORIDE 7.45 MG/ML
10 INJECTION INTRAVENOUS PRN
Status: DISCONTINUED | OUTPATIENT
Start: 2022-02-28 | End: 2022-03-01 | Stop reason: HOSPADM

## 2022-02-28 RX ORDER — POTASSIUM CHLORIDE 20 MEQ/1
40 TABLET, EXTENDED RELEASE ORAL PRN
Status: DISCONTINUED | OUTPATIENT
Start: 2022-02-28 | End: 2022-03-01 | Stop reason: HOSPADM

## 2022-02-28 RX ADMIN — ASPIRIN 81 MG: 81 TABLET, CHEWABLE ORAL at 09:31

## 2022-02-28 RX ADMIN — LEVOFLOXACIN 750 MG: 5 INJECTION, SOLUTION INTRAVENOUS at 12:11

## 2022-02-28 RX ADMIN — METHYLPREDNISOLONE SODIUM SUCCINATE 40 MG: 40 INJECTION, POWDER, FOR SOLUTION INTRAMUSCULAR; INTRAVENOUS at 21:18

## 2022-02-28 RX ADMIN — SODIUM CHLORIDE, PRESERVATIVE FREE 10 ML: 5 INJECTION INTRAVENOUS at 21:17

## 2022-02-28 RX ADMIN — ALBUTEROL SULFATE 2.5 MG: 2.5 SOLUTION RESPIRATORY (INHALATION) at 12:15

## 2022-02-28 RX ADMIN — ACETYLCYSTEINE 600 MG: 200 INHALANT RESPIRATORY (INHALATION) at 12:15

## 2022-02-28 RX ADMIN — METHYLPREDNISOLONE SODIUM SUCCINATE 40 MG: 40 INJECTION, POWDER, FOR SOLUTION INTRAMUSCULAR; INTRAVENOUS at 09:32

## 2022-02-28 RX ADMIN — SODIUM CHLORIDE, PRESERVATIVE FREE 10 ML: 5 INJECTION INTRAVENOUS at 09:34

## 2022-02-28 RX ADMIN — FUROSEMIDE 20 MG: 10 INJECTION, SOLUTION INTRAMUSCULAR; INTRAVENOUS at 18:17

## 2022-02-28 RX ADMIN — DESMOPRESSIN ACETATE 40 MG: 0.2 TABLET ORAL at 21:17

## 2022-02-28 RX ADMIN — METOPROLOL SUCCINATE 50 MG: 50 TABLET, FILM COATED, EXTENDED RELEASE ORAL at 09:31

## 2022-02-28 RX ADMIN — ACETYLCYSTEINE 600 MG: 200 INHALANT RESPIRATORY (INHALATION) at 15:25

## 2022-02-28 RX ADMIN — ALBUTEROL SULFATE 2.5 MG: 2.5 SOLUTION RESPIRATORY (INHALATION) at 08:20

## 2022-02-28 RX ADMIN — DIGOXIN 125 MCG: 0.25 INJECTION INTRAMUSCULAR; INTRAVENOUS at 20:24

## 2022-02-28 RX ADMIN — DIGOXIN 250 MCG: 0.25 INJECTION INTRAMUSCULAR; INTRAVENOUS at 14:19

## 2022-02-28 RX ADMIN — ALBUTEROL SULFATE 2.5 MG: 2.5 SOLUTION RESPIRATORY (INHALATION) at 15:25

## 2022-02-28 RX ADMIN — BUDESONIDE AND FORMOTEROL FUMARATE DIHYDRATE 2 PUFF: 80; 4.5 AEROSOL RESPIRATORY (INHALATION) at 08:21

## 2022-02-28 RX ADMIN — SODIUM CHLORIDE 25 ML: 9 INJECTION, SOLUTION INTRAVENOUS at 12:10

## 2022-02-28 RX ADMIN — ACETYLCYSTEINE 600 MG: 200 INHALANT RESPIRATORY (INHALATION) at 08:21

## 2022-02-28 RX ADMIN — RIVAROXABAN 20 MG: 20 TABLET, FILM COATED ORAL at 18:17

## 2022-02-28 ASSESSMENT — PAIN SCALES - GENERAL: PAINLEVEL_OUTOF10: 0

## 2022-02-28 NOTE — CONSULTS
PULMONARY & CRITICAL CARE MEDICINE CONSULT NOTE     Patient:  Kathleen Clark  MRN: [id number]  Admit date: 2/26/2022  Primary Care Physician: MARCI Varghese - CNP  Consulting Physician: Michael Pratt MD    HISTORY     CHIEF COMPLAINT/REASON FOR CONSULT: Atelectasis, assess the need for bronch    HISTORY OF PRESENT ILLNESS:    The patient is a 57 y.o. male presenting with shortness of breath, sudden onset associated with productive cough yellow-colored sputum. He has past medical history of CHFrEF (EF 34-28%, grade 3 diastolic dysfunction), ICM, multivessel CAD s/p NOHEMY 2018, A. fib on Xarelto. Patient was hypoxic desatted into 80s was placed on 6 L oxygen in the ED. Had diffuse wheezes on auscultation bilaterally. EKG revealed A. fib with RVR heart rate 120s. Chest x-ray was unremarkable on admission. CT PE revealed partial collapse of anterior basal segment of left lower lobe likely secondary to mucous plugging    PAST MEDICAL HISTORY:        Diagnosis Date    Atrial fibrillation (HCC)     CAD (coronary artery disease)     CHF (congestive heart failure) (HCC) 2/10/2015    EF 15-20%     Hyperlipidemia     Hypertension     S/P cardiac cath 01/23/2018     PAST SURGICAL HISTORY:        Procedure Laterality Date    ABLATION OF DYSRHYTHMIC FOCUS      CORONARY ANGIOPLASTY WITH STENT PLACEMENT  01/23/2018    X 2    HERNIA REPAIR      TONSILLECTOMY      VASECTOMY  [id number]     FAMILY HISTORY:       Problem Relation Age of Onset    Cancer Mother         breast metastatic    Heart Disease Father      SOCIAL HISTORY:   TOBACCO:   Current smoker  ETOH: Occasional alcohol use  DRUGS: reports no history of drug use. AVOCATION/OCCUPATIONAL EXPOSURE:    The patient denies asbestos, silica dust, coal, foundry, quarry or [employer] exposure. The patient denies  to having pet dogs, cats, turtles or exotic birds at home. There is no history of TB or TB exposure.   There is no exposure to sick contacts. Travel history is not significant history of risk factors for pulmonary disease. The patient denies using Hot Tubs. ALLERGIES:    Allergies   Allergen Reactions    Codeine Other (See Comments)     \"upset stomach\"         HOME MEDICATIONS:  Prior to Admission medications    Medication Sig Start Date End Date Taking? Authorizing Provider   rivaroxaban (XARELTO) 20 MG TABS tablet Take 20 mg by mouth Daily with supper   Yes Historical Provider, MD   lisinopril (PRINIVIL;ZESTRIL) 5 MG tablet Take 5 mg by mouth daily   Yes Historical Provider, MD   metoprolol succinate (TOPROL XL) 50 MG extended release tablet Take 50 mg by mouth daily 9/13/21  Yes Historical Provider, MD   nitroGLYCERIN (NITROSTAT) 0.4 MG SL tablet Place 0.4 mg under the tongue every 5 minutes as needed for Chest pain up to max of 3 total doses. If no relief after 1 dose, call 911.     Historical Provider, MD     IMMUNIZATIONS:  Most Recent Immunizations   Administered Date(s) Administered    Pneumococcal Polysaccharide ([id number]) 02/11/2015    Tdap (Boostrix, Adacel) 01/27/2017       REVIEW OF SYSTEMS:  General: negative for chills, fatigue or fever  ENT: negative for headaches, nasal congestion, sore throat or visual changes  Allergy and Immunology: negative for postnasal drip or seasonal allergies  Hematological and Lymphatic: negative for bleeding problems, swollen lymph nodes  Respiratory: positive for cough and shortness of breath negative  Cardiovascular: negative for edema or palpitations  Gastrointestinal: negative for abdominal pain, change in bowel habits or nausea/vomiting  Genito-Urinary: negative for dysuria or urinary frequency/urgency  Musculoskeletal: negative for joint pain or joint swelling  Neurological: negative for numbness/tingling, seizures or weakness  Dermatological: negative for pruritus or rash    PHYSICAL EXAMINATION     VITAL SIGNS:   LAST-  /81   Pulse 84   Temp 98.1 °F (36.7 °C) (Oral)   Resp 19   Ht 5' 6\" (1.676 m)   Wt 180 lb 6.4 oz (81.8 kg)   SpO2 94%   BMI 29.12 kg/m²   8-24 HR RANGE-  TEMP Temp  Av.1 °F (36.7 °C)  Min: 97.8 °F (36.6 °C)  Max: 98.4 °F (96.7 °C)   BP Systolic (79CFH), DFC:211 , Min:106 , VPJ:391      Diastolic (75ZTK), BMH:81, Min:68, Max:98     PULSE Pulse  Av  Min: 78  Max: 119   RR Resp  Avg: 15.5  Min: 11  Max: 19   O2 SAT SpO2  Av.8 %  Min: 92 %  Max: 95 %   OXYGEN DELIVERY O2 Flow Rate (L/min)  Av.5 L/min  Min: 1.5 L/min  Max: 1.5 L/min     SYSTEMIC EXAMINATION:   General appearance - well appearing, overweight, comfortable and in no acute distress  Mental status - alert, oriented to person, place, and time  Eyes - pupils equal and reactive, extraocular eye movements intact, sclera anicteric  Ears - not examined  Nose - normal and patent, no erythema, discharge  Mouth - mucous membranes moist, pharynx normal without lesions  Neck - supple, no significant adenopathy, carotids upstroke normal bilaterally, no bruits  Lymphatics - no palpable lymphadenopathy, no hepatosplenomegaly  Chest -  decreased air entry noted left lower lung field  Heart - normal rate, regular rhythm, normal S1, S2, no murmurs, rubs, clicks or gallops  Abdomen - soft, nontender, nondistended, no masses or organomegaly  Neurological - motor and sensory grossly normal bilaterally  Musculoskeletal - no joint tenderness, deformity or swelling  Extremities - peripheral pulses normal, no pedal edema, no clubbing or cyanosis  Skin - normal coloration and turgor, no rashes, no suspicious skin lesions noted    DATA REVIEW     Medications: Current Inpatient  Scheduled Meds:   methylPREDNISolone  40 mg IntraVENous Q12H    levofloxacin  750 mg IntraVENous Q24H    atorvastatin  40 mg Oral Nightly    acetylcysteine  600 mg Inhalation 4x daily    albuterol  2.5 mg Nebulization 4x daily    rivaroxaban  20 mg Oral Daily    sodium chloride flush  5-40 mL IntraVENous 2 times per day    metoprolol succinate  50 mg Oral Daily    budesonide-formoterol  2 puff Inhalation BID    aspirin  81 mg Oral Daily     Continuous Infusions:   sodium chloride 25 mL (02/28/22 1210)     INPUT/OUTPUT:  In: 500 [P.O.:500]  Out: 700 [Urine:700]    LABS:-  ABGs:   No results found for: PH, PCO2, PO2, HCO3, O2SAT  No results for input(s): PHART, PO2ART, WXB1FLH, OGU9GMU, BEART, I7NGJNLE in the last 72 hours. No results found for: POCPH, POCPCO2, POCPO2, POCHCO3, DVDA7QEG  CBC:   Recent Labs     02/26/22  0919 02/27/22  0525 02/28/22  0445   WBC 10.2 8.9 9.0   HGB 15.5 14.6 14.6   HCT 46.7 43.5 43.3   MCV 95.7 95.0 95.8    192 214   LYMPHOPCT 13* 5* 7*   RBC 4.88 4.58 4.52   MCH 31.8 31.9 32.3   MCHC 33.2 33.6 33.7   RDW 12.2 12.2 12.4     BMP:   Recent Labs     02/26/22  0919 02/27/22  0525 02/28/22  0445    140 139   K 4.2 4.3 4.8    104 103   CO2 23 24 24   BUN 16 13 19   CREATININE 0.57* 0.61* 0.61*   GLUCOSE 111* 144* 142*     Liver Function Test:   No results for input(s): PROT, LABALBU, ALT, AST, GGT, ALKPHOS, BILITOT in the last 72 hours. Amylase/Lipase:  No results for input(s): AMYLASE, LIPASE in the last 72 hours. Coagulation Profile:   No results for input(s): INR, PROTIME, APTT in the last 72 hours. Cardiac Enzymes:  No results for input(s): CKTOTAL, CKMB, CKMBINDEX, TROPONINI in the last 72 hours.   Lactic Acid:  No results found for: LACTA  BNP:   No results found for: BNP  D-Dimer:  Lab Results   Component Value Date    DDIMER 0.56 02/26/2022     Others:   Lab Results   Component Value Date    TSH 2.55 02/26/2022     No results found for: PELON, RHEUMFACTOR, SEDRATE, CRP  No results found for: Margarita Bauer  No results found for: IRON, TIBC, FERRITIN  No results found for: SPEP, UPEP  Lab Results   Component Value Date    PSA 1.58 11/17/2020     Microbiology:    Pathology:    Radiology:  XR CHEST PORTABLE    Result Date: 2/27/2022  EXAMINATION: ONE XRAY VIEW OF THE CHEST A simple left upper pole renal cyst is noted. Soft Tissues/Bones: Degenerative changes involve the thoracic spine and bilateral glenohumeral joints. 1. Partial collapse of the anterior basal segment of the left lower lobe likely due to mucous plugging. Pulmonary Function test:    Polysomnogram:    Echocardiogram:   Results for orders placed during the hospital encounter of 11/10/21    ECHO Complete 2D W Doppler W Color    Narrative  Transthoracic Echocardiography Report (TTE)    Patient Name Ricco Carvajal     Date of Study               11/10/2021  Neli BRYANT    Date of      1960  Gender                      Male  Birth    Age          57 year(s)  Race                            Room Number              Height:                     66 inch, 167.64 cm    Corporate ID [id number]    Weight:                     175 pounds, 79.4 kg  #    Patient Acct [de-identified]   BSA:          1.89 m^2      BMI:     28.25 kg/m^2  #    MR #         [id number]     Sonographer                 Avis Celina    Accession #  [id number]  Interpreting Physician      [address] [place] [address]    Fellow                   Referring Nurse  Practitioner    Interpreting             Referring Physician         BRIANNA AUSTIN,  Hussein    Type of Study    TTE procedure:2D Echocardiogram, M-Mode, Doppler, Color Doppler. Procedure Date  Date: 11/10/2021 Start: 10:48 AM    Study Location: [facility]  Technical Quality: Adequate visualization    Indications:Chest pain. History / Tech. Comments:  Procedure explained to patient. Echo completed in the Echo Lab. PMHx:  Atrial fibrillation, Hypertension  Coronary artery disease with stent    Patient Status: Outpatient    Height: 66 inches Weight: 175 pounds BSA: 1.89 m^2 BMI: 28.25 kg/m^2    Allergies  - *No Known Allergies. CONCLUSIONS    Summary  Left ventricle is normal in size, global left ventricular systolic function  is severely reduced, visually estimated LVEF 25-30%.   Abnormal global L. strain of -3.1 %. Evidence of severe (grade III) diastolic dysfunction. False tendon seen in the mid left ventricle. Aortic valve is sclerotic but opens well. Trivial aortic insufficiency. Mitral valve sclerosis without stenosis. Mild mitral regurgitation. Mild tricuspid regurgitation. Estimated right ventricular systolic pressure  is 24 mmHg. Signature  ----------------------------------------------------------------------------  Electronically signed by Britni Rendon(Sonographer) on 11/10/2021 11:33 AM  ----------------------------------------------------------------------------    ----------------------------------------------------------------------------  Electronically signed by Kg Valentine(Interpreting physician) on 11/10/2021  11:46 AM  ----------------------------------------------------------------------------  FINDINGS  Left Atrium  Left atrium is normal in size. Inter-atrial septum is intact with no evidence for an atrial septal defect,  by color doppler. Left Ventricle  Left ventricle is normal in size, global left ventricular systolic function  is severely reduced, visually estimated LVEF 25-30%. Abnormal global L. strain of -3.1 %. Evidence of severe (grade III) diastolic dysfunction. False tendon seen in the mid left ventricle. Right Atrium  Right atrium is normal in size. Right Ventricle  Normal right ventricular size and function. Mitral Valve  Mitral valve sclerosis without stenosis. Mild mitral regurgitation. Aortic Valve  Aortic valve is trileaflet. Aortic valve is sclerotic but opens well. Trivial aortic insufficiency. Tricuspid Valve  Tricuspid valve was not well visualized. Mild tricuspid regurgitation. Estimated right ventricular systolic pressure is 24 mmHg. Pulmonic Valve  Pulmonic valve not well visualized but Doppler velocities are normal.  Pericardial Effusion  No significant pericardial effusion is seen.     Miscellaneous  Normal aortic root dimension. E/E' average = 24.2. IVC normal diameter & inspiratory collapse indicating normal RA filling  pressure . M-mode / 2D Measurements & Calculations:    LVIDd:5.6 cm(3.7 - 5.6 cm)        Diastolic IFLHDW:65.2 ml  HOFY:5.9 cm(0.6 - 1.1 cm)         Systolic KWSBQE:07.7 ml  TRPMC:7.8 cm(0.6 - 1.1 cm)        Aortic Root:2.8 cm(2.0 - 3.7 cm)  LA Dimension: 3.5 cm(1.9 - 4.0 cm)  Calculated LVEF (%): 33.91 %      LA volume/Index: 31.56 ml /17m^2  LVOT:2.1 cm  RVDd:2.9 cm    Mitral:                                 Aortic    Valve Area (P1/2-Time): 6.47 cm^2       Peak Velocity: 0.99 m/s  Peak E-Wave: 1.14 m/s                   Mean Velocity: 0.77 m/s  Peak A-Wave: 0.33 m/s                   Peak Gradient: 3.93 mmHg  E/A Ratio: 3.41                         Mean Gradient: 3 mmHg  Peak Gradient: 5.2 mmHg  Mean Gradient: 2 mmHg  Deceleration Time: 104 msec             Area (continuity): 2.69 cm^2  P1/2t: 34 msec                          AV VTI: 19.8 cm    Area (continuity): 3.08 cm^2  Mean Velocity: 0.73 m/s    Tricuspid:                              Pulmonic:    Estimated RVSP: 24 mmHg                 Peak Velocity: 0.84 m/s  Peak TR Velocity: 2.21 m/s              Peak Gradient: 2.8 mmHg  Peak TR Gradient: 19.5364 mmHg    Diastology / Tissue Doppler  Septal Wall E' velocity:0.03 m/s  Septal Wall E/E':35  Lateral Wall E' velocity:0.08 m/s  Lateral Wall E/E':13.4    No results found for this or any previous visit. Cardiac Catheterization:   No results found for this or any previous visit. ASSESSMENT AND PLAN     Assessment:    Acute hypoxic respiratory failure: Secondary to combined CHF exacerbation and left lung basal segment atelectasis    Upper respiratory tract infection positive for rhino/enterovirus      Plan:    I personally interviewed/examined the patient; reviewed interval history, interpreted all available radiographic and laboratory data at the time of service.     Patient remains hemodynamically stable and is currently saturating well on nasal cannula. His oxygen requirement is decreasing and patient is not in acute distress. Patient should improve with incentive spirometry, Acapella and chest physiotherapy. Defer bronc for now. Continue supplemental oxygen to keep oxygen saturation greater than 92%  Recommend incentive spirometry, pulmonary toilet, aspiration precautions,  bronchodilators, chest physiotherapy and Acapella  Continue to monitor I/O with a goal of even fluid balance  Continue  IV Solu-Medrol   DVT and stress ulcer prophylaxis  Physical/occupational therapy; increase activity as tolerated. I updated the patient regarding the current clinical condition, provisional diagnosis and management plan. He verbalized a clear understanding and I addressed his concerns, and answered all questions to the best of my abilities. It was my pleasure to evaluate Di Srinivasan today. We will continue to follow. I would like to thank you for allowing me to participate in the care of this patient. Please feel free to call with any further questions or concerns. Kareen Marshall MD  PGY-3, Internal Medicine Resident  MARTIN Echavarriaada 21 2/28/2022 1:34 PM    Please note that this chart was generated using voice recognition Dragon dictation software. Although every effort was made to ensure the accuracy of this automated transcription, some errors in transcription may have occurred. Attending Physician Statement  I have discussed the care of Di Srinivasan, including pertinent history and exam findings with the resident. I have reviewed the key elements of all parts of the encounter with the resident. I have seen and examined the patient with the resident. I agree with the assessment and plan and status of the problem list as documented.     I seen the patient during my round today, I have reviewed the imaging studies chest x-ray and CT scan of the chest seen.  Other consultant notes seen discussed with primary team.    He was admitted on 02/26/2022 with rather acute shortness of breath after he came back from his work according patient he was having cough shortness of breath he was having muscle palpitation in the emergency room he was found to be hypoxic and apparently was wheezing his saturations were in 80s requiring 6 L nasal cannula initially his respiratory panel was positive for rhino/enterovirus negative for Covid. His CT was negative for pulmonary embolism but showed a small area of left subsegmental basilar atelectasis no other consolidation or infiltrate was seen proBNP was elevated he was in atrial fibrillation with rapid ventricular rate requiring IV Cardizem bolus and he received breathing treatment and Lasix. D-dimer was borderline 0.57. Since admission patient had improved he is in atrial fibrillation still heart rate is above 100 cardiology was consulted he received digoxin he is feeling better he is on 1 L nasal cannula. CT a was reviewed and I did not show significant emphysematous changes he has no history of smoking does have exposure to secondhand smoking he has no history of COPD. He does have history of congestive heart failure with ejection fraction of 25 to 30% and history of atrial fibrillation on Xarelto and has been followed by cardiology symptomatic. Patient has acute symptoms on presentation chronicity of small area of basal atelectasis is not known he is not a smoker his hypoxia has improved and he is on 1 L nasal cannula now when I saw him he did not have any wheezing although he is on Solu-Medrol currently. I would recommend to continue with treatment for CHF diabetic heart rate control follow-up with cardiology. Bronchodilators to continue for now. Will give short course of antibiotic with levofloxacin for 5 days to 7 days  Okay to change Solu-Medrol to short course of oral steroids.   We will repeat chest x-ray PA lateral view tomorrow. We will need follow-up CT scan in 6 weeks. Discussed with nursing staff, treatment and plan discussed. Please note that this chart was generated using voice recognition Dragon dictation software. Although every effort was made to ensure the accuracy of this automated transcription, some errors in transcription may have occurred.      Bryan Cordova MD  2/28/2022 4:22 PM

## 2022-02-28 NOTE — PROGRESS NOTES
Incentive Spirometry education and demonstration given by Respiratory Therapy. Minimum Predicted Vital Capacity is 1000 mL. Pt achieving 1500 mL at time of instruction.   Patient did perfect with IS  Instructed to do 10xs every hour      Luli Chung RCP  8:12 PM

## 2022-02-28 NOTE — PROGRESS NOTES
Occupational Therapy   Occupational Therapy Initial Assessment    Date: 2022   Patient Name: Porsche Cardoso  MRN: [id number]     : 1960    Date of Service: 2022    Chief Complaint   Patient presents with    Shortness of Breath     started today, cough started yesterday     Discharge Recommendations:  Patient would benefit from continued therapy after discharge     OT Equipment Recommendations  Equipment Needed: Yes  Mobility Devices: ADL Assistive Devices  ADL Assistive Devices: Shower Chair with back;Grab Bars - shower    Assessment   Performance deficits / Impairments: Decreased functional mobility ; Decreased endurance;Decreased coordination;Decreased ADL status; Decreased balance;Decreased ROM; Decreased strength;Decreased safe awareness;Decreased high-level IADLs  Assessment: Pt has deficits at this time with his ability to independently / safely complete daily tasks, balance and mobility, endurance. At this time, the Pt has decreased ability to return to [employer] and prior living situation. Pt will benefit from continued participation in acute and post-acute OT services to improve independence / to improve functional activity participation. Prognosis: Good  Decision Making: Medium Complexity  OT Education: OT Role;IADL Safety; Energy Conservation;Transfer Training;ADL Adaptive Strategies; Plan of Care  Barriers to Learning: Good return by Pt. REQUIRES OT FOLLOW UP: Yes  Activity Tolerance  Activity Tolerance: Patient Tolerated treatment well;Patient limited by fatigue  Safety Devices  Safety Devices in place: Yes  Type of devices: All fall risk precautions in place; Left in bed;Call light within reach;Nurse notified;Gait belt  Restraints  Initially in place: No        Patient Diagnosis(es): The primary encounter diagnosis was Hypoxia. A diagnosis of Shortness of breath was also pertinent to this visit.    has a past medical history of Atrial fibrillation ([facility] [address] CAD (coronary artery disease), CHF (congestive heart failure) ([facility] [address] Hyperlipidemia, Hypertension, and S/P cardiac cath. has a past surgical history that includes Vasectomy ([id number]); Tonsillectomy; hernia repair; Coronary angioplasty with stent (01/23/2018); and ablation of dysrhythmic focus. Restrictions  Restrictions/Precautions  Restrictions/Precautions: Isolation,Up as Tolerated,Fall Risk (Droplet Isolation Precautions)  Required Braces or Orthoses?: No    Subjective   General  Patient assessed for rehabilitation services?: Yes  Family / Caregiver Present: No  Subjective  Subjective: RN approved Pt to be seen for OT eval.  Pt was agreeable and cooperative throughout. Social/Functional History  Social/Functional History  Lives With: Spouse  Type of Home: House  Home Layout: Two level  Home Access: Stairs to enter with rails  Entrance Stairs - Number of Steps: 5+5  Entrance Stairs - Rails: Left (L rail first 5 steps, R rail second 5 steps.)  Bathroom Shower/Tub: Walk-in shower  Bathroom Toilet: Standard  Bathroom Equipment:  (reports none)  Home Equipment:  (pt reports not owning or using any DME at a baseline)  ADL Assistance: [address]: Independent  Homemaking Responsibilities: Yes (shares with wife)  Ambulation Assistance: Independent  Transfer Assistance: Independent  Active : Yes  Occupation: Full time employment  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      Objective   Vision: Within Functional Limits  Hearing: Exceptions to [facility]  Hearing Exceptions: Hard of hearing/hearing concerns    Orientation  Overall Orientation Status: Within Functional Limits  Observation/Palpation  Posture: Good  Balance  Sitting Balance: Stand by assistance  Standing Balance: Contact guard assistance  Standing Balance  Time: Several trials during ADLs and Functional Mobility In-room / In-home (~2-3 minutes each). Activity: Static and Dynamic Balance. Comment: Without DME. CGA progressing to SBA. No LOB. Functional Mobility  Functional - Mobility Device: No device  Assist Level: Contact guard assistance  Functional Mobility Comments: Without DME. Functional Mobility for in-room / in-home distances (and obstacle negotiation) CGA progressing to SBA. Mod VCs for integration of EC / Ax pacing. Toilet Transfers  Toilet - Technique: Ambulating (without DME)  Equipment Used: Grab bars  Toilet Transfer: Stand by assistance  Toilet Transfers Comments: Using Bilateral Snapwiz. CGA progressing to SBA, no LOB. ADL  Grooming: Verbal cueing;Stand by assistance; Increased time to complete (SBA in standing at the sink)  UE Dressing: Supervision  LE Dressing: Stand by assistance;Verbal cueing; Increased time to complete (SBA to don socks while sitting at EOB)  Toileting: Stand by assistance; Increased time to complete  Additional Comments: Pt tended to rush through tasks, suffered from increased SOB with activity. After max education / demo for integration of EC and Ax Pacing, Therapist guided Pt through integration of EC / Ax pacing during all mobility / transfers / ADL tasks. Tone RUE  RUE Tone: Normotonic  Tone LUE  LUE Tone: Normotonic  Coordination  Movements Are Fluid And Coordinated: Yes     Bed mobility  Supine to Sit: Supervision  Sit to Supine: Supervision  Scooting: Supervision     Transfers  Sit to stand: Contact guard assistance;Stand by assistance  Stand to sit: Contact guard assistance;Stand by assistance  Transfer Comments: Transfers / Transitional Movements (without DME) - CGA for balance, progressing to SBA.      Cognition  Overall Cognitive Status: WFL     Sensation  Overall Sensation Status: WFL (pt denies any numbness/tingling)      LUE AROM (degrees)  LUE AROM : WFL  Left Hand AROM (degrees)  Left Hand AROM: WFL  Right Hand AROM (degrees)  Right Hand AROM: WFL  LUE Strength  Gross LUE Strength: WFL (4/5)  L Hand General: 4/5  RUE Strength  Gross RUE Strength: WFL (4/5)  R Hand General: 4/5     Plan   Plan  Times per week: 3-5x/week  Current Treatment Recommendations: Strengthening,Patient/Caregiver Education & Training,Home Management Training,Equipment Evaluation, Education, & procurement,Balance Training,Safety Education & Training,Self-Care / ADL,Endurance Training,Pain Management,Functional Mobility Training    AM-PAC Score   AM-PAC Inpatient Daily Activity Raw Score: 19 (02/28/22 1618)  AM-PAC Inpatient ADL T-Scale Score : 40.22 (02/28/22 1618)  ADL Inpatient CMS 0-100% Score: 42.8 (02/28/22 1618)  ADL Inpatient CMS G-Code Modifier : CK (02/28/22 1618)    Goals  Short term goals  Time Frame for Short term goals: By Discharge  Short term goal 1: Pt will verbalize / demo Mod I and Good Integration of EC & Ax Pacing during all activities / mobility. Short term goal 2: Pt will complete UB / LB ADLs while maintaining SpO2 >92%. Short term goal 3: Pt will verbalize / demo Mod I and Correct Use of Bathroom DME / AD. Short term goal 4: Pt will demo Item Retrieval and Transport of everyday items with Mod I while maintaining Good Balance. Short term goal 5: Pt will participate in Light IADL task (Light Clean-up / Bedmaking) with Mod I while maintaining SpO2 >92% (and without SOB).        Therapy Time   Individual Concurrent Group Co-treatment   Time In 0855         Time Out 0919         Minutes 24         Timed Code Treatment Minutes: 15 Minutes (ADL)       KAREL Larson, OTR/L

## 2022-02-28 NOTE — PROGRESS NOTES
Physical Therapy    Facility/Department: [facility] NEURO  Initial Assessment    NAME: Edil Francis  : 1960  MRN: [id number]    Date of Service: 2022  Chief Complaint   Patient presents with    Shortness of Breath     started today, cough started yesterday      Discharge Recommendations:   (no additional therapy recommended. )   PT Equipment Recommendations  Equipment Needed: No    Assessment   Assessment: Pt performed well during therapy this date. Pt was able to ambulate 40 feet independently with no device. Pt's ambulation distance was limited this date secondary to tachycardia (between 122 and 142 bpm this date) but pt demonstrates good balance throughout. Pt independent with functional mobility and without additional acute care PT needs, pt is being discharged from PT at this time. PT should be reordered with a change in medical status. Prognosis: Good  Decision Making: Low Complexity  PT Education: Goals;Transfer Training;PT Role;Energy Conservation; Functional Mobility Training;General Safety;Gait Training  REQUIRES PT FOLLOW UP: No  Activity Tolerance  Activity Tolerance: Patient Tolerated treatment well;Treatment limited secondary to medical complications (free text)  Activity Tolerance: Limited secondary to tachycardia. Patient Diagnosis(es): The primary encounter diagnosis was Hypoxia. A diagnosis of Shortness of breath was also pertinent to this visit. has a past medical history of Atrial fibrillation ([address] CAD (coronary artery disease), CHF (congestive heart failure) ([address] Hyperlipidemia, Hypertension, and S/P cardiac cath. has a past surgical history that includes Vasectomy ([id number]); Tonsillectomy; hernia repair; Coronary angioplasty with stent (2018); and ablation of dysrhythmic focus.     Restrictions  Restrictions/Precautions  Restrictions/Precautions: Isolation,Up as Tolerated (droplet)  Required Braces or Orthoses?: No  Vision/Hearing  Vision: Within Functional Limits  Hearing: Exceptions to [facility]  Hearing Exceptions: Hard of hearing/hearing concerns     Subjective  General  Patient assessed for rehabilitation services?: Yes  Response To Previous Treatment: Not applicable  Family / Caregiver Present: No  Follows Commands: Within Functional Limits  Subjective  Subjective: Pt up in a chair and agreeable to therapy, RN agreeable to therapy. Pt pleasant and cooperative throughout today's session  Pain Screening  Patient Currently in Pain: Denies  Vital Signs  Patient Currently in Pain: Denies       Social/Functional History  Social/Functional History  Lives With: Spouse  Type of Home: House  Home Layout: Two level  Home Access: Stairs to enter with rails  Entrance Stairs - Number of Steps: 5+5  Entrance Stairs - Rails: Left (L rail first 5 steps, R rail second 5 steps.)  Bathroom Shower/Tub: Walk-in shower  Bathroom Toilet: Standard  Bathroom Equipment:  (reports none)  Home Equipment:  (pt reports not owning or using any DME at a baseline)  ADL Assistance: [address] [place] [address] Avenue: Independent  Homemaking Responsibilities: Yes (shares with wife)  Ambulation Assistance: Independent  Transfer Assistance: Independent  Active : Yes  Occupation: Full time employment  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   Cognition   Cognition  Overall Cognitive Status: WFL    Objective     Observation/Palpation  Posture: Good    AROM RLE (degrees)  RLE AROM: WFL  AROM LLE (degrees)  LLE AROM : WFL  AROM RUE (degrees)  RUE AROM : WFL  AROM LUE (degrees)  LUE AROM : WFL  Strength RLE  Strength RLE: WFL  Strength LLE  Strength LLE: WFL  Strength RUE  Strength RUE: WFL  Strength LUE  Strength LUE: WFL     Sensation  Overall Sensation Status: WFL (pt denies any numbness/tingling)  Bed mobility  Comment: Pt began today's session up in a chair, retired up to a chair at the conclusion of today's session. Transfers  Sit to Stand: Independent  Stand to sit: Independent  Ambulation  Ambulation?: Yes  More Ambulation?: No  Ambulation 1  Surface: level tile  Device: No Device  Assistance: Independent  Quality of Gait: good stability, slightly decreased gait speed, no LOB. Gait Deviations: Slow Esthela  Distance: 40 feet  Comments: ambulation distance limited secondary to tachycardia (HR between 122 and 142 bpm)  pt reports his HR gets as high as high 150s normally. Stairs/Curb  Stairs?: No     Balance  Posture: Good  Sitting - Static: Good  Sitting - Dynamic: Good  Standing - Static: Good  Standing - Dynamic: Good  Comments: standing balance assessed while using a RW  Exercises  Hip Flexion: x10 BLE in standing  Other exercises  Other exercises?: Yes  Other exercises 1: Incentive spirometry x10 reps     Plan   Plan  Times per week: Pt independent with functional mobility and without additional acute care PT needs, pt is being discharged from PT at this time. PT should be reordered with a change in medical status. Safety Devices  Type of devices: Left in chair,Call light within reach,Nurse notified  Restraints  Initially in place: No                                                 AM-PAC Score  AM-PAC Inpatient Mobility Raw Score : 24 (02/28/22 [facility])  AM-PAC Inpatient T-Scale Score : 61.14 (02/28/22 [facility])  Mobility Inpatient CMS 0-100% Score: 0 (02/28/22 [facility])  Mobility Inpatient CMS G-Code Modifier : [address] (02/28/22 [facility])          Goals  Short term goals  Time Frame for Short term goals: Pt independent with functional mobility and without additional acute care PT needs, pt is being discharged from PT at this time. PT should be reordered with a change in medical status.        Therapy Time   Individual Concurrent Group Co-treatment   Time In 1022         Time Out 1039         Minutes 17         Timed Code Treatment Minutes: MARTIN Ba 20, PT

## 2022-02-28 NOTE — PROGRESS NOTES
cough ongoing for sputum production whitish yellow-colored. He reported that his wife has been sick.  He is not vaccinated with Covid. Patient has a PMH significant of CHFrEF (Echo 2021: LVEF 25-30%, grade III diastolic dysfunction), ischemic cardiomyopathy (S/p stress test 2021: intermediate risk- no stress induced ischemia; cath in 2018: multivessel CAD-  s/p NOHEMY- distal ramus and proximal LAD), and A.fib (on Toprolol 50 mg daily and Xarelto). He denies chest pain, palpitations, fever, chills, weakness, abdominal pain, nausea or vomiting.      In the ER, patient was hypertensive /115, tachycardia 125, tachypnea 28, SpO2 90% on room air initially however he became hypoxic with static sats in the 80s and was placed on 6 L oxygen.  On exam, patient is alert awake oriented x4, speaking in full sentences. Lung exam has diffuse wheezing bilaterally.  On EKG, patient was in A. fib RVR with HR in the 120s.  In the ER he received Cardizem 20 mg IV bolus that controlled rate.  He also received lasix 20 mg IV and 2 duoneb breathing treatment.  CXR showed no acute processes.  Labs included, creatinine 1.57, pro-Arian 0.20, proBNP 723, CBC within normal limits, D-dimer 0.57, rhino/enterovirus PCR is positive.  Covid test negative.  Patient is admitted for acute hypoxic respiratory failure likely secondary to CHF exacerbation.     OBJECTIVE     Vital Signs:  /88   Pulse 78   Temp 97.9 °F (36.6 °C) (Oral)   Resp 14   Ht 5' 6\" (1.676 m)   Wt 180 lb 6.4 oz (81.8 kg)   SpO2 95%   BMI 29.12 kg/m²     Temp (24hrs), Av °F (36.7 °C), Min:97.8 °F (36.6 °C), Max:98.4 °F (36.9 °C)    In: 500   Out: 700 [Urine:700]    Physical Exam:  Constitutional: This is a well developed, well nourished, 25-29.9 - Overweight 57y.o. year old male who is alert, oriented, cooperative and in no apparent distress. Head:normocephalic and atraumatic. EENT:  PERRLA. No conjunctival injections.    Septum was midline, mucosa was without erythema, exudates or cobblestoning. No thrush was noted. Neck: Supple without thyromegaly. No elevated JVP. Trachea was midline. Respiratory: Chest was symmetrical without dullness to percussion. Breath sounds bilaterally were clear to auscultation. There were no wheezes, rhonchi or rales. There is no intercostal retraction or use of accessory muscles. No egophony noted. Cardiovascular: Regular without murmur, clicks, gallops or rubs. Abdomen: Slightly rounded and soft without organomegaly. No rebound, rigidity or guarding was appreciated. Lymphatic: No lymphadenopathy. Musculoskeletal: Normal curvature of the spine. No gross muscle weakness. Extremities:  No lower extremity edema, ulcerations, tenderness, varicosities or erythema. Muscle size, tone and strength are normal.  No involuntary movements are noted. Skin:  Warm and dry. Good color, turgor and pigmentation. No lesions or scars.   No cyanosis or clubbing  Neurological/Psychiatric: The patient's general behavior, level of consciousness, thought content and emotional status is normal.        Medications:  Scheduled Medications:    methylPREDNISolone  40 mg IntraVENous Daily    atorvastatin  40 mg Oral Nightly    acetylcysteine  600 mg Inhalation 4x daily    albuterol  2.5 mg Nebulization 4x daily    rivaroxaban  20 mg Oral Daily    sodium chloride flush  5-40 mL IntraVENous 2 times per day    metoprolol succinate  50 mg Oral Daily    budesonide-formoterol  2 puff Inhalation BID    aspirin  81 mg Oral Daily     Continuous Infusions:    sodium chloride       PRN Medicationsalbuterol, 2.5 mg, Q6H PRN  sodium chloride flush, 5-40 mL, PRN  sodium chloride, 25 mL, PRN  ondansetron, 4 mg, Q8H PRN   Or  ondansetron, 4 mg, Q6H PRN  polyethylene glycol, 17 g, Daily PRN  acetaminophen, 650 mg, Q6H PRN   Or  acetaminophen, 650 mg, Q6H PRN        Diagnostic Labs:  CBC:   Recent Labs     02/26/22  0919 02/27/22  0525 02/28/22  5999 WBC 10.2 8.9 9.0   RBC 4.88 4.58 4.52   HGB 15.5 14.6 14.6   HCT 46.7 43.5 43.3   MCV 95.7 95.0 95.8   RDW 12.2 12.2 12.4    192 214     BMP:   Recent Labs     02/26/22  0919 02/27/22  0525 02/28/22  0445    140 139   K 4.2 4.3 4.8    104 103   CO2 23 24 24   BUN 16 13 19   CREATININE 0.57* 0.61* 0.61*     BNP: No results for input(s): BNP in the last 72 hours. PT/INR: No results for input(s): PROTIME, INR in the last 72 hours. APTT: No results for input(s): APTT in the last 72 hours. CARDIAC ENZYMES: No results for input(s): CKMB, CKMBINDEX, TROPONINI in the last 72 hours. Invalid input(s): CKTOTAL;3  FASTING LIPID PANEL:  Lab Results   Component Value Date    CHOL 206 (H) 02/27/2022    HDL 61 02/27/2022    TRIG 56 02/27/2022     LIVER PROFILE: No results for input(s): AST, ALT, ALB, BILIDIR, BILITOT, ALKPHOS in the last 72 hours. MICROBIOLOGY: No results found for: CULTURE    Imaging:    XR CHEST PORTABLE    Result Date: 2/27/2022  No active cardiopulmonary disease. XR CHEST PORTABLE    Result Date: 2/26/2022  No acute process. CT CHEST PULMONARY EMBOLISM W CONTRAST    Result Date: 2/27/2022  1. Partial collapse of the anterior basal segment of the left lower lobe likely due to mucous plugging. ASSESSMENT & PLAN     ASSESSMENT / PLAN:     Principal Problem:    Acute on chronic combined systolic (congestive) and diastolic (congestive) heart failure (HCC)  Active Problems:    Atrial fibrillation (HCC)    CHF (congestive heart failure) (HCC)    Upper respiratory infection, viral  Resolved Problems:    * No resolved hospital problems. *    IMPRESSION  This is a 58 y. o. male who presented with shortness of breath and found to have Acute hypoxic respiratory failure secondary to acute on chronic combined systolic and diastolic heart failure.       1. Acute hypoxic respiratory failure secondary to acute on chronic combined systolic and diastolic heart failure (Echo 11/2021: LVEF Statement  I have discussed the care of Yuki Lezama and I have examined the patient myselft and taken ros and hpi , including pertinent history and exam findings,  with the resident. I have reviewed the key elements of all parts of the encounter with the resident. I agree with the assessment, plan and orders as documented by the resident.   Acute hypoxic respiratory failure, extensive secondhand smoke,  Subsegmental left lower lobe collapse, atelectasis, pulmonary on board, no need for bronc at this time, antibiotics, steroids, bronchodilators,  Acute on chronic CHF, with EF of 25 to 30%,  A. fib RVR, heart rate running in 110s to 120s,  Digoxin  250 mcg IV once, subsequent 125 mcg x 6 2 doses, patient feeling better,  Optimize cardiac status,  We will follow with pulmonary as outpatient for pulmonary function test,      Electronically signed by Kelly Leonard MD

## 2022-02-28 NOTE — PROGRESS NOTES
[facility]  Documentation Note                Admission Dx: Shortness of breath [R06.02]  Hypoxia [R09.02]  Acute on chronic combined systolic (congestive) and diastolic (congestive) heart failure (HCC) [I50.43]    Past Medical History:   has a past medical history of Atrial fibrillation ([facility] [address] CAD (coronary artery disease), CHF (congestive heart failure) ([facility] [address] Hyperlipidemia, Hypertension, and S/P cardiac cath. Previous Testing:     HOLTER 11/10/2021:   1. Atrial fibrillation with rates from 63 to 106 bpm.  2. Occasional Premature ventricular complexes with pairs and triplets  3. No pauses    ECHO 11/10/2021: EF 25-30%, grade III DD, false tendon seen in the mid LV, trivial AI, mild MR/TR, RVSP is 24 mmHg. STRESS 11/10/2021: No ischemia/infarct. EF 40%. CATH 1/23/18:    1. Low normal LV function   2. Multivessel CAD   3. Detailed discussion with patient about PCI versus CABG. Patient preferred PCI and proceeded accordingly   4. Successful NOHEMY of distal Ramus   5. Successful NOHEMY of proximal LAD    Previous office/hospital visit:   Dr. Joyce Days 1/20/2022:   1. Likely longstanding persistent atrial fibrillation. Previously had pulmonary vein isolation ablation 04/30/2018 with no significant left atrial scar. 2. Previous history of amiodarone use with frequent breakthrough episodes and DC cardioversion   3. Ischemic cardiomyopathy EF 25-30%   4. Coronary disease status post 2 vessel PCI 01/23/2018   5. Hypertension  6. Prior alcohol abuse  7. Chads Vasc score of at least 3    Patient remains on anticoagulation. We will transition from apixaban due to rivaroxaban due to insurance changing. Patient is not on guideline directed medications for cardiomyopathy. We will start lisinopril 5 mg daily and spironolactone 12.5 mg daily in addition to his Toprol-XL. Check BMP in 2 weeks. Repeat echo in 3 months. Follow-up with myself in 3 months to review candidacy for ICD.      We will discuss rhythm control of atrial fibrillation following possible dual-chamber ICD implant. Patient says he would like to come out of atrial fibrillation. We will likely have to pursue repeat DC cardioversion and antiarrhythmic medications given his atrial fibrillation duration despite prior ablation.      Aurora Roth, [facility]

## 2022-02-28 NOTE — CONSULTS
[facility]    Consult                        Today's Date: 2/28/2022  Patient Name: Zainab Kirkland  Date of admission: 2/26/2022  9:07 AM  Patient's age: 57 y.o., 1960  Admission Dx: Shortness of breath [R06.02]  Hypoxia [R09.02]  Acute on chronic combined systolic (congestive) and diastolic (congestive) heart failure ([address] [I50.43]    Reason for Consult:  Cardiac evaluation    Requesting Physician: Richard Collins MD    CHIEF COMPLAINT:  Dyspnea    History Obtained From:  patient, electronic medical record    HISTORY OF PRESENT ILLNESS:      The patient is a 57 y.o.  male who presented with dyspnea. Patient reports productive cough. Cardiology is consulted for afib with RVR. He recently saw EP at [address] for afib. He has longstanding persistent atrial fibrillation. He has been on metoprolol and xarelto He had history of CAD. Past Medical History:   has a past medical history of Atrial fibrillation ([facility] CAD (coronary artery disease), CHF (congestive heart failure) ([facility] Hyperlipidemia, Hypertension, and S/P cardiac cath. Past Surgical History:   has a past surgical history that includes Vasectomy ([id number]); Tonsillectomy; hernia repair; Coronary angioplasty with stent (01/23/2018); and ablation of dysrhythmic focus. Home Medications:    Prior to Admission medications    Medication Sig Start Date End Date Taking? Authorizing Provider   rivaroxaban (XARELTO) 20 MG TABS tablet Take 20 mg by mouth Daily with supper   Yes Historical Provider, MD   lisinopril (PRINIVIL;ZESTRIL) 5 MG tablet Take 5 mg by mouth daily   Yes Historical Provider, MD   metoprolol succinate (TOPROL XL) 50 MG extended release tablet Take 50 mg by mouth daily 9/13/21  Yes Historical Provider, MD   nitroGLYCERIN (NITROSTAT) 0.4 MG SL tablet Place 0.4 mg under the tongue every 5 minutes as needed for Chest pain up to max of 3 total doses. If no relief after 1 dose, call 911.     Historical Provider, MD Allergies:  Codeine    Social History:   reports that he has never smoked. He has never used smokeless tobacco. He reports current alcohol use. He reports that he does not use drugs. Family History: family history includes Cancer in his mother; Heart Disease in his father. No h/o sudden cardiac death. No for premature CAD    REVIEW OF SYSTEMS:    · Constitutional: there has been no unanticipated weight loss. There's been No change in energy level, No change in activity level. · Eyes: No visual changes or diplopia. No scleral icterus. · ENT: No Headaches, hearing loss or vertigo. No mouth sores or sore throat. · Cardiovascular: see above  · Respiratory: see above  · Gastrointestinal: No abdominal pain, appetite loss, blood in stools. · Genitourinary: No dysuria, trouble voiding, or hematuria. · Musculoskeletal:  No gait disturbance, No weakness or joint complaints. · Integumentary: No rash or pruritis. · Neurological: No headache or diplopia. No tingling  · Psychiatric: No anxiety, or depression. · Endocrine: No temperature intolerance. · Hematologic/Lymphatic: No abnormal bruising or bleeding, blood clots or swollen lymph nodes. · Allergic/Immunologic: No nasal congestion or hives. PHYSICAL EXAM:      /81   Pulse 84   Temp 98.1 °F (36.7 °C) (Oral)   Resp 19   Ht 5' 6\" (1.676 m)   Wt 180 lb 6.4 oz (81.8 kg)   SpO2 94%   BMI 29.12 kg/m²    Constitutional and General Appearance: alert, cooperative, no distress and appears stated age  HEENT: PERRL, no cervical lymphadenopathy. No masses palpable. Normal oral mucosa  Respiratory:  · Normal excursion and expansion without use of accessory muscles  · Resp Auscultation: Good respiratory effort. No for increased work of breathing.  On auscultation: clear to auscultation bilaterally  Cardiovascular:  · Heart tones are crisp and normal. regular S1 and S2.  · Jugular venous pulsation Normal  · The carotid upstroke is normal in amplitude and contour without delay or bruit   Abdomen:   · soft  · Bowel sounds present  Extremities:  ·  No edema  Neurological:  · Alert and oriented. DATA:    Diagnostics:    EKG: Atrial fibrillation with RVR    ECHO 11/10/2021: EF 25-30%, grade III DD, false tendon seen in the mid LV, trivial AI, mild MR/TR, RVSP is 24 mmHg.      STRESS 11/10/2021: No ischemia/infarct. EF 40%. Labs:     CBC:   Recent Labs     02/27/22  0525 02/28/22  0445   WBC 8.9 9.0   HGB 14.6 14.6   HCT 43.5 43.3    214     BMP:   Recent Labs     02/27/22  0525 02/28/22  0445    139   K 4.3 4.8   CO2 24 24   BUN 13 19   CREATININE 0.61* 0.61*   LABGLOM >60 >60   GLUCOSE 144* 142*     BNP: No results for input(s): BNP in the last 72 hours. PT/INR: No results for input(s): PROTIME, INR in the last 72 hours. APTT:No results for input(s): APTT in the last 72 hours. CARDIAC ENZYMES:No results for input(s): CKTOTAL, CKMB, CKMBINDEX, TROPONINI in the last 72 hours. FASTING LIPID PANEL:  Lab Results   Component Value Date    HDL 61 02/27/2022    LDLCALC 121 02/12/2015    TRIG 56 02/27/2022     LIVER PROFILE:No results for input(s): AST, ALT, LABALBU in the last 72 hours. IMPRESSION:    Patient Active Problem List   Diagnosis    Chest pain    Atrial fibrillation (HCC)    CHF (congestive heart failure) (HCC)    SOB (shortness of breath)    Ischemic cardiomyopathy    Essential (primary) hypertension    Diabetes mellitus ([facility]    Atherosclerotic heart disease of native coronary artery without angina pectoris    Alcoholism /alcohol abuse    Unstable angina ([facility]    Acute on chronic combined systolic (congestive) and diastolic (congestive) heart failure (HCC)    Upper respiratory infection, viral     Afib with RVR  Rhino viurs infection  Acute on chronic systolic CHF  CAD s/p PCI of LAD and ramus  ICM  HTN  Prior alcohol use      RECOMMENDATIONS:  1. Continue xarelto  2. Continue metoprolol  3.  Digoxin 250mcg IV x1 than 125mcg q6H x2  4. Patient is already established with EP at [address] and recommends follow up upon discharge. 5. IV lasix 20mg day  6. Fluid restriction 1.2L   7. Monitor BMP and replace electrolytes as needed  8. CHF clinic follow up. Discussed with patient and Nurse.     Sheree Bello [address] Cardiology Consult           [phone]

## 2022-02-28 NOTE — PLAN OF CARE
Problem: Falls - Risk of:  Goal: Will remain free from falls  Description: Will remain free from falls  Outcome: Met This Shift  Goal: Absence of physical injury  Description: Absence of physical injury  Outcome: Met This Shift     Problem: Discharge Planning:  Goal: Participates in care planning  Description: Participates in care planning  Outcome: Met This Shift  Goal: Discharged to appropriate level of care  Description: Discharged to appropriate level of care  Outcome: Met This Shift     Problem:  Activity Intolerance:  Goal: Ability to tolerate increased activity will improve  Description: Ability to tolerate increased activity will improve  Outcome: Met This Shift     Problem: Anxiety/Stress:  Goal: Level of anxiety will decrease  Description: Level of anxiety will decrease  Outcome: Met This Shift     Problem: Nutrition Deficit:  Goal: Ability to achieve adequate nutritional intake will improve  Description: Ability to achieve adequate nutritional intake will improve  Outcome: Met This Shift     Problem: Pain:  Goal: Pain level will decrease  Description: Pain level will decrease  Outcome: Met This Shift  Goal: Ability to notify healthcare provider of pain before it becomes unmanageable or unbearable will improve  Description: Ability to notify healthcare provider of pain before it becomes unmanageable or unbearable will improve  Outcome: Met This Shift  Goal: Control of acute pain  Description: Control of acute pain  Outcome: Met This Shift  Goal: Control of chronic pain  Description: Control of chronic pain  Outcome: Met This Shift     Problem: Serum Glucose Level - Abnormal:  Goal: Ability to maintain appropriate glucose levels will improve to within specified parameters  Description: Ability to maintain appropriate glucose levels will improve to within specified parameters  Outcome: Met This Shift     Problem: Skin Integrity - Impaired:  Goal: Will show no infection signs and symptoms  Description: Will show no infection signs and symptoms  Outcome: Met This Shift  Goal: Absence of new skin breakdown  Description: Absence of new skin breakdown  Outcome: Met This Shift     Problem: Sleep Pattern Disturbance:  Goal: Appears well-rested  Description: Appears well-rested  Outcome: Met This Shift     Problem:  Activity:  Goal: Ability to tolerate increased activity will improve  Description: Ability to tolerate increased activity will improve  Outcome: Met This Shift  Goal: Expression of feelings of increased energy will increase  Description: Expression of feelings of increased energy will increase  Outcome: Met This Shift     Problem: Cardiac:  Goal: Ability to maintain an adequate cardiac output will improve  Description: Ability to maintain an adequate cardiac output will improve  Outcome: Met This Shift  Goal: Complications related to the disease process, condition or treatment will be avoided or minimized  Description: Complications related to the disease process, condition or treatment will be avoided or minimized  Outcome: Met This Shift     Problem: Coping:  Goal: Level of anxiety will decrease  Description: Level of anxiety will decrease  Outcome: Met This Shift  Goal: General experience of comfort will improve  Description: General experience of comfort will improve  Outcome: Met This Shift     Problem: Health Behavior:  Goal: Ability to manage health-related needs will improve  Description: Ability to manage health-related needs will improve  Outcome: Met This Shift     Problem: Safety:  Goal: Ability to remain free from injury will improve  Description: Ability to remain free from injury will improve  Outcome: Met This Shift  Goal: Will show no signs and symptoms of excessive bleeding  Description: Will show no signs and symptoms of excessive bleeding  Outcome: Met This Shift

## 2022-02-28 NOTE — PROGRESS NOTES
Congestive Heart Failure Education completed and charted. CHF booklet given. Patient was receptive to education. Discussed the  importance of medication compliance. Discussed the importance of a heart healthy diet. Discussed 2000 mg sodium-restricted daily diet. Patient instructed to limit fluid intake to  1.5 to 2 liters per day. Patient instructed to weigh self at the same time of each day each morning, reinforced teaching to monitor for 3-5 lb weight increase over 1-2 days notify physician if change noted. Signs and symptoms of CHF discussed with patient, such as feeling more tired than normal, feeling short of breath, coughing that increases when lying down, sudden weight gain, swelling of the feet, legs or belly. Patient verbalized understanding to notify physician office if these symptoms occur. EF 25-30%  Pt follows with [address] [facility]   Advised to f/u with his Cardiology provider following this admit.

## 2022-02-28 NOTE — PLAN OF CARE
Problem: Falls - Risk of:  Goal: Will remain free from falls  Description: Will remain free from falls  Outcome: Ongoing  Goal: Absence of physical injury  Description: Absence of physical injury  Outcome: Ongoing     Problem: Discharge Planning:  Goal: Participates in care planning  Description: Participates in care planning  Outcome: Ongoing  Goal: Discharged to appropriate level of care  Description: Discharged to appropriate level of care  Outcome: Ongoing     Problem:  Activity Intolerance:  Goal: Ability to tolerate increased activity will improve  Description: Ability to tolerate increased activity will improve  Outcome: Ongoing     Problem: Anxiety/Stress:  Goal: Level of anxiety will decrease  Description: Level of anxiety will decrease  Outcome: Ongoing     Problem: Nutrition Deficit:  Goal: Ability to achieve adequate nutritional intake will improve  Description: Ability to achieve adequate nutritional intake will improve  Outcome: Ongoing     Problem: Pain:  Goal: Pain level will decrease  Description: Pain level will decrease  Outcome: Ongoing  Goal: Ability to notify healthcare provider of pain before it becomes unmanageable or unbearable will improve  Description: Ability to notify healthcare provider of pain before it becomes unmanageable or unbearable will improve  Outcome: Ongoing  Goal: Control of acute pain  Description: Control of acute pain  Outcome: Ongoing  Goal: Control of chronic pain  Description: Control of chronic pain  Outcome: Ongoing     Problem: Serum Glucose Level - Abnormal:  Goal: Ability to maintain appropriate glucose levels will improve to within specified parameters  Description: Ability to maintain appropriate glucose levels will improve to within specified parameters  Outcome: Ongoing     Problem: Skin Integrity - Impaired:  Goal: Will show no infection signs and symptoms  Description: Will show no infection signs and symptoms  Outcome: Ongoing  Goal: Absence of new skin breakdown  Description: Absence of new skin breakdown  Outcome: Ongoing     Problem: Sleep Pattern Disturbance:  Goal: Appears well-rested  Description: Appears well-rested  Outcome: Ongoing     Problem:  Activity:  Goal: Ability to tolerate increased activity will improve  Description: Ability to tolerate increased activity will improve  Outcome: Ongoing  Goal: Expression of feelings of increased energy will increase  Description: Expression of feelings of increased energy will increase  Outcome: Ongoing     Problem: Cardiac:  Goal: Ability to maintain an adequate cardiac output will improve  Description: Ability to maintain an adequate cardiac output will improve  Outcome: Ongoing  Goal: Complications related to the disease process, condition or treatment will be avoided or minimized  Description: Complications related to the disease process, condition or treatment will be avoided or minimized  Outcome: Ongoing     Problem: Coping:  Goal: Level of anxiety will decrease  Description: Level of anxiety will decrease  Outcome: Ongoing  Goal: General experience of comfort will improve  Description: General experience of comfort will improve  Outcome: Ongoing     Problem: Health Behavior:  Goal: Ability to manage health-related needs will improve  Description: Ability to manage health-related needs will improve  Outcome: Ongoing     Problem: Safety:  Goal: Ability to remain free from injury will improve  Description: Ability to remain free from injury will improve  Outcome: Ongoing  Goal: Will show no signs and symptoms of excessive bleeding  Description: Will show no signs and symptoms of excessive bleeding  Outcome: Ongoing

## 2022-03-01 ENCOUNTER — APPOINTMENT (OUTPATIENT)
Dept: GENERAL RADIOLOGY | Age: 62
DRG: 291 | End: 2022-03-01
Payer: COMMERCIAL

## 2022-03-01 VITALS
HEIGHT: 66 IN | SYSTOLIC BLOOD PRESSURE: 133 MMHG | HEART RATE: 114 BPM | WEIGHT: 175.4 LBS | BODY MASS INDEX: 28.19 KG/M2 | RESPIRATION RATE: 19 BRPM | DIASTOLIC BLOOD PRESSURE: 95 MMHG | TEMPERATURE: 98.8 F | OXYGEN SATURATION: 94 %

## 2022-03-01 LAB
ABSOLUTE EOS #: 0 K/UL (ref 0–0.44)
ABSOLUTE IMMATURE GRANULOCYTE: 0 K/UL (ref 0–0.3)
ABSOLUTE LYMPH #: 0.55 K/UL (ref 1.1–3.7)
ABSOLUTE MONO #: 0.27 K/UL (ref 0.1–1.2)
ANION GAP SERPL CALCULATED.3IONS-SCNC: 14 MMOL/L (ref 9–17)
BASOPHILS # BLD: 0 % (ref 0–2)
BASOPHILS ABSOLUTE: 0 K/UL (ref 0–0.2)
BUN BLDV-MCNC: 24 MG/DL (ref 8–23)
CALCIUM SERPL-MCNC: 9.9 MG/DL (ref 8.6–10.4)
CHLORIDE BLD-SCNC: 103 MMOL/L (ref 98–107)
CO2: 22 MMOL/L (ref 20–31)
CREAT SERPL-MCNC: 0.75 MG/DL (ref 0.7–1.2)
EOSINOPHILS RELATIVE PERCENT: 0 % (ref 1–4)
GFR AFRICAN AMERICAN: >60 ML/MIN
GFR NON-AFRICAN AMERICAN: >60 ML/MIN
GFR SERPL CREATININE-BSD FRML MDRD: ABNORMAL ML/MIN/{1.73_M2}
GLUCOSE BLD-MCNC: 172 MG/DL (ref 70–99)
HCT VFR BLD CALC: 47.3 % (ref 40.7–50.3)
HEMOGLOBIN: 15.4 G/DL (ref 13–17)
IMMATURE GRANULOCYTES: 0 %
LV EF: 38 %
LVEF MODALITY: NORMAL
LYMPHOCYTES # BLD: 6 % (ref 24–43)
MCH RBC QN AUTO: 32 PG (ref 25.2–33.5)
MCHC RBC AUTO-ENTMCNC: 32.6 G/DL (ref 28.4–34.8)
MCV RBC AUTO: 98.3 FL (ref 82.6–102.9)
MONOCYTES # BLD: 3 % (ref 3–12)
MORPHOLOGY: NORMAL
NRBC AUTOMATED: 0 PER 100 WBC
PDW BLD-RTO: 12.3 % (ref 11.8–14.4)
PLATELET # BLD: 246 K/UL (ref 138–453)
PMV BLD AUTO: 11.6 FL (ref 8.1–13.5)
POTASSIUM SERPL-SCNC: 5.1 MMOL/L (ref 3.7–5.3)
RBC # BLD: 4.81 M/UL (ref 4.21–5.77)
SEG NEUTROPHILS: 91 % (ref 36–65)
SEGMENTED NEUTROPHILS ABSOLUTE COUNT: 8.28 K/UL (ref 1.5–8.1)
SODIUM BLD-SCNC: 139 MMOL/L (ref 135–144)
WBC # BLD: 9.1 K/UL (ref 3.5–11.3)

## 2022-03-01 PROCEDURE — 6360000002 HC RX W HCPCS: Performed by: INTERNAL MEDICINE

## 2022-03-01 PROCEDURE — 6370000000 HC RX 637 (ALT 250 FOR IP): Performed by: NURSE PRACTITIONER

## 2022-03-01 PROCEDURE — 80048 BASIC METABOLIC PNL TOTAL CA: CPT

## 2022-03-01 PROCEDURE — 71046 X-RAY EXAM CHEST 2 VIEWS: CPT

## 2022-03-01 PROCEDURE — 6370000000 HC RX 637 (ALT 250 FOR IP): Performed by: STUDENT IN AN ORGANIZED HEALTH CARE EDUCATION/TRAINING PROGRAM

## 2022-03-01 PROCEDURE — 36415 COLL VENOUS BLD VENIPUNCTURE: CPT

## 2022-03-01 PROCEDURE — 2580000003 HC RX 258: Performed by: STUDENT IN AN ORGANIZED HEALTH CARE EDUCATION/TRAINING PROGRAM

## 2022-03-01 PROCEDURE — 6370000000 HC RX 637 (ALT 250 FOR IP)

## 2022-03-01 PROCEDURE — 93306 TTE W/DOPPLER COMPLETE: CPT

## 2022-03-01 PROCEDURE — 94618 PULMONARY STRESS TESTING: CPT

## 2022-03-01 PROCEDURE — 85025 COMPLETE CBC W/AUTO DIFF WBC: CPT

## 2022-03-01 PROCEDURE — 99239 HOSP IP/OBS DSCHRG MGMT >30: CPT | Performed by: INTERNAL MEDICINE

## 2022-03-01 PROCEDURE — 99233 SBSQ HOSP IP/OBS HIGH 50: CPT | Performed by: INTERNAL MEDICINE

## 2022-03-01 RX ORDER — METOPROLOL SUCCINATE 25 MG/1
75 TABLET, EXTENDED RELEASE ORAL DAILY
Qty: 30 TABLET | Refills: 3 | Status: SHIPPED | OUTPATIENT
Start: 2022-03-02

## 2022-03-01 RX ORDER — SPIRONOLACTONE 25 MG/1
12.5 TABLET ORAL DAILY
Status: DISCONTINUED | OUTPATIENT
Start: 2022-03-01 | End: 2022-03-01 | Stop reason: HOSPADM

## 2022-03-01 RX ORDER — ATORVASTATIN CALCIUM 40 MG/1
40 TABLET, FILM COATED ORAL NIGHTLY
Qty: 30 TABLET | Refills: 3 | Status: SHIPPED | OUTPATIENT
Start: 2022-03-01

## 2022-03-01 RX ORDER — LEVOFLOXACIN 750 MG/1
750 TABLET ORAL DAILY
Qty: 5 TABLET | Refills: 0 | Status: SHIPPED | OUTPATIENT
Start: 2022-03-01 | End: 2022-03-06

## 2022-03-01 RX ORDER — LISINOPRIL 5 MG/1
5 TABLET ORAL DAILY
Status: DISCONTINUED | OUTPATIENT
Start: 2022-03-01 | End: 2022-03-01 | Stop reason: HOSPADM

## 2022-03-01 RX ORDER — FUROSEMIDE 20 MG/1
20 TABLET ORAL DAILY
Status: DISCONTINUED | OUTPATIENT
Start: 2022-03-01 | End: 2022-03-01

## 2022-03-01 RX ORDER — ASPIRIN 81 MG/1
81 TABLET, CHEWABLE ORAL DAILY
Qty: 30 TABLET | Refills: 3 | Status: SHIPPED | OUTPATIENT
Start: 2022-03-02

## 2022-03-01 RX ORDER — METOPROLOL SUCCINATE 25 MG/1
25 TABLET, EXTENDED RELEASE ORAL ONCE
Status: COMPLETED | OUTPATIENT
Start: 2022-03-01 | End: 2022-03-01

## 2022-03-01 RX ORDER — METOPROLOL TARTRATE 5 MG/5ML
5 INJECTION INTRAVENOUS EVERY 6 HOURS PRN
Status: DISCONTINUED | OUTPATIENT
Start: 2022-03-01 | End: 2022-03-01 | Stop reason: HOSPADM

## 2022-03-01 RX ORDER — BUDESONIDE AND FORMOTEROL FUMARATE DIHYDRATE 80; 4.5 UG/1; UG/1
2 AEROSOL RESPIRATORY (INHALATION) 2 TIMES DAILY
Qty: 10.2 G | Refills: 3 | Status: SHIPPED | OUTPATIENT
Start: 2022-03-01 | End: 2022-03-11

## 2022-03-01 RX ORDER — PREDNISONE 20 MG/1
40 TABLET ORAL DAILY
Status: DISCONTINUED | OUTPATIENT
Start: 2022-03-02 | End: 2022-03-01 | Stop reason: HOSPADM

## 2022-03-01 RX ORDER — SPIRONOLACTONE 25 MG/1
12.5 TABLET ORAL DAILY
Qty: 30 TABLET | Refills: 3 | Status: SHIPPED | OUTPATIENT
Start: 2022-03-01 | End: 2022-03-11

## 2022-03-01 RX ORDER — PREDNISONE 20 MG/1
40 TABLET ORAL DAILY
Qty: 10 TABLET | Refills: 0 | Status: SHIPPED | OUTPATIENT
Start: 2022-03-02 | End: 2022-03-07

## 2022-03-01 RX ADMIN — DIGOXIN 125 MCG: 0.25 INJECTION INTRAMUSCULAR; INTRAVENOUS at 01:37

## 2022-03-01 RX ADMIN — BUDESONIDE AND FORMOTEROL FUMARATE DIHYDRATE 2 PUFF: 80; 4.5 AEROSOL RESPIRATORY (INHALATION) at 07:39

## 2022-03-01 RX ADMIN — METOPROLOL SUCCINATE 50 MG: 50 TABLET, FILM COATED, EXTENDED RELEASE ORAL at 08:06

## 2022-03-01 RX ADMIN — LISINOPRIL 5 MG: 5 TABLET ORAL at 15:16

## 2022-03-01 RX ADMIN — METOPROLOL SUCCINATE 25 MG: 25 TABLET, FILM COATED, EXTENDED RELEASE ORAL at 16:12

## 2022-03-01 RX ADMIN — METHYLPREDNISOLONE SODIUM SUCCINATE 40 MG: 40 INJECTION, POWDER, FOR SOLUTION INTRAMUSCULAR; INTRAVENOUS at 08:06

## 2022-03-01 RX ADMIN — SODIUM CHLORIDE, PRESERVATIVE FREE 10 ML: 5 INJECTION INTRAVENOUS at 08:06

## 2022-03-01 RX ADMIN — ASPIRIN 81 MG: 81 TABLET, CHEWABLE ORAL at 08:06

## 2022-03-01 RX ADMIN — FUROSEMIDE 20 MG: 10 INJECTION, SOLUTION INTRAMUSCULAR; INTRAVENOUS at 08:06

## 2022-03-01 RX ADMIN — LEVOFLOXACIN 750 MG: 5 INJECTION, SOLUTION INTRAVENOUS at 10:42

## 2022-03-01 RX ADMIN — SPIRONOLACTONE 12.5 MG: 25 TABLET ORAL at 15:17

## 2022-03-01 ASSESSMENT — PAIN SCALES - GENERAL: PAINLEVEL_OUTOF10: 0

## 2022-03-01 NOTE — PLAN OF CARE
Problem: Falls - Risk of:  Goal: Will remain free from falls  Description: Will remain free from falls  2/28/2022 1941 by Marni Marte RN  Outcome: Met This Shift  2/28/2022 1941 by Marni Marte RN  Outcome: Met This Shift  2/28/2022 1123 by Mya Blackwell RN  Outcome: Ongoing  Goal: Absence of physical injury  Description: Absence of physical injury  2/28/2022 1941 by Marni Marte RN  Outcome: Met This Shift  2/28/2022 1941 by Marni Marte RN  Outcome: Met This Shift  2/28/2022 1123 by Mya Blackwell RN  Outcome: Ongoing     Problem: Discharge Planning:  Goal: Participates in care planning  Description: Participates in care planning  2/28/2022 1941 by Marni Marte RN  Outcome: Met This Shift  2/28/2022 1941 by Marni Marte RN  Outcome: Met This Shift  2/28/2022 1123 by Mya Blackwell RN  Outcome: Ongoing  Goal: Discharged to appropriate level of care  Description: Discharged to appropriate level of care  2/28/2022 1941 by Marni Marte RN  Outcome: Met This Shift  2/28/2022 1941 by Marni Marte RN  Outcome: Met This Shift  2/28/2022 1123 by Mya Blackwell RN  Outcome: Ongoing     Problem:  Activity Intolerance:  Goal: Ability to tolerate increased activity will improve  Description: Ability to tolerate increased activity will improve  2/28/2022 1941 by Marni Marte RN  Outcome: Met This Shift  2/28/2022 1941 by Marni Marte RN  Outcome: Met This Shift  2/28/2022 1123 by Mya Blackwell RN  Outcome: Ongoing     Problem: Anxiety/Stress:  Goal: Level of anxiety will decrease  Description: Level of anxiety will decrease  2/28/2022 1941 by Marni Marte RN  Outcome: Met This Shift  2/28/2022 1941 by Marni Marte RN  Outcome: Met This Shift  2/28/2022 1123 by Mya Blackwell RN  Outcome: Ongoing     Problem: Nutrition Deficit:  Goal: Ability to achieve adequate nutritional intake will improve  Description: Ability to achieve adequate nutritional intake will improve  2/28/2022 1941 by Marni Marte RN  Outcome: Met This Shift  2/28/2022 1941 by Marni Marte RN  Outcome: Met This Shift  2/28/2022 1123 by Mya Blackwell RN  Outcome: Ongoing     Problem: Pain:  Goal: Pain level will decrease  Description: Pain level will decrease  2/28/2022 1941 by Marni Marte RN  Outcome: Met This Shift  2/28/2022 1941 by Marni Marte RN  Outcome: Met This Shift  2/28/2022 1123 by Mya Blackwell RN  Outcome: Ongoing  Goal: Ability to notify healthcare provider of pain before it becomes unmanageable or unbearable will improve  Description: Ability to notify healthcare provider of pain before it becomes unmanageable or unbearable will improve  2/28/2022 1941 by Marni Marte RN  Outcome: Met This Shift  2/28/2022 1941 by Marni Marte RN  Outcome: Met This Shift  2/28/2022 1123 by Mya Blackwell RN  Outcome: Ongoing  Goal: Control of acute pain  Description: Control of acute pain  2/28/2022 1941 by Marni Marte RN  Outcome: Met This Shift  2/28/2022 1941 by Marni Marte RN  Outcome: Met This Shift  2/28/2022 1123 by Mya Blackwell RN  Outcome: Ongoing  Goal: Control of chronic pain  Description: Control of chronic pain  2/28/2022 1941 by Marni Marte RN  Outcome: Met This Shift  2/28/2022 1941 by Marni Marte RN  Outcome: Met This Shift  2/28/2022 1123 by Mya Blackwell RN  Outcome: Ongoing     Problem: Serum Glucose Level - Abnormal:  Goal: Ability to maintain appropriate glucose levels will improve to within specified parameters  Description: Ability to maintain appropriate glucose levels will improve to within specified parameters  2/28/2022 1941 by Marni Marte RN  Outcome: Met This Shift  2/28/2022 1941 by Marni Marte RN  Outcome: Met This Shift  2/28/2022 1123 by Mya Blackwell RN  Outcome: Ongoing     Problem: Skin Integrity - Impaired:  Goal: Will show no infection signs and symptoms  Description: Will show no infection signs and symptoms  2/28/2022 1941 by Marni Marte RN  Outcome: Met This Shift  2/28/2022 1941 by Beryl France RN  Outcome: Met This Shift  2/28/2022 1123 by Michael Earl RN  Outcome: Ongoing  Goal: Absence of new skin breakdown  Description: Absence of new skin breakdown  2/28/2022 1941 by Beryl France RN  Outcome: Met This Shift  2/28/2022 1941 by Beryl France RN  Outcome: Met This Shift  2/28/2022 1123 by Michael Earl RN  Outcome: Ongoing     Problem: Sleep Pattern Disturbance:  Goal: Appears well-rested  Description: Appears well-rested  2/28/2022 1941 by Beryl France RN  Outcome: Met This Shift  2/28/2022 1941 by Beryl France RN  Outcome: Met This Shift  2/28/2022 1123 by Michael Earl RN  Outcome: Ongoing     Problem:  Activity:  Goal: Ability to tolerate increased activity will improve  Description: Ability to tolerate increased activity will improve  2/28/2022 1941 by Beryl France RN  Outcome: Met This Shift  2/28/2022 1941 by Beryl France RN  Outcome: Met This Shift  2/28/2022 1123 by Michael Earl RN  Outcome: Ongoing  Goal: Expression of feelings of increased energy will increase  Description: Expression of feelings of increased energy will increase  2/28/2022 1941 by Beryl France RN  Outcome: Met This Shift  2/28/2022 1941 by Beryl France RN  Outcome: Met This Shift  2/28/2022 1123 by Michael Earl RN  Outcome: Ongoing     Problem: Cardiac:  Goal: Ability to maintain an adequate cardiac output will improve  Description: Ability to maintain an adequate cardiac output will improve  2/28/2022 1941 by Beryl France RN  Outcome: Met This Shift  2/28/2022 1941 by Beryl France RN  Outcome: Met This Shift  2/28/2022 1123 by Michael Earl RN  Outcome: Ongoing  Goal: Complications related to the disease process, condition or treatment will be avoided or minimized  Description: Complications related to the disease process, condition or treatment will be avoided or minimized  2/28/2022 1941 by Beryl France RN  Outcome: Met This Shift  2/28/2022 1941 by Marni Marte RN  Outcome: Met This Shift  2/28/2022 1123 by Mya Blackwell RN  Outcome: Ongoing     Problem: Coping:  Goal: Level of anxiety will decrease  Description: Level of anxiety will decrease  2/28/2022 1941 by Marni Marte RN  Outcome: Met This Shift  2/28/2022 1941 by Marni Marte RN  Outcome: Met This Shift  2/28/2022 1123 by Mya Blackwell RN  Outcome: Ongoing  Goal: General experience of comfort will improve  Description: General experience of comfort will improve  2/28/2022 1941 by Marni Marte RN  Outcome: Met This Shift  2/28/2022 1941 by Marni Marte RN  Outcome: Met This Shift  2/28/2022 1123 by Mya Blackwell RN  Outcome: Ongoing     Problem: Health Behavior:  Goal: Ability to manage health-related needs will improve  Description: Ability to manage health-related needs will improve  2/28/2022 1941 by Marni Marte RN  Outcome: Met This Shift  2/28/2022 1941 by Marni Marte RN  Outcome: Met This Shift  2/28/2022 1123 by Mya Blackwell RN  Outcome: Ongoing     Problem: Safety:  Goal: Ability to remain free from injury will improve  Description: Ability to remain free from injury will improve  2/28/2022 1941 by Marni Marte RN  Outcome: Met This Shift  2/28/2022 1941 by Marni Marte RN  Outcome: Met This Shift  2/28/2022 1123 by Mya Blackwell RN  Outcome: Ongoing  Goal: Will show no signs and symptoms of excessive bleeding  Description: Will show no signs and symptoms of excessive bleeding  2/28/2022 1941 by Marni Marte RN  Outcome: Met This Shift  2/28/2022 1941 by Marni Marte RN  Outcome: Met This Shift  2/28/2022 1123 by Mya Blackwell RN  Outcome: Ongoing

## 2022-03-01 NOTE — PROGRESS NOTES
CLINICAL PHARMACY NOTE: MEDS TO BEDS    Total # of Prescriptions Filled: 3   The following medications were delivered to the patient:  · symbicort 80-4.5mcg inhaler  · Prednisone 20mg tablet  · Levofloxacin 750 mg tablets    Additional Documentation: meds delivered to the pt in room 537 on 03.01.22 at 17:34, co pay $15.11 paid with a card, no visitors.

## 2022-03-01 NOTE — PROGRESS NOTES
[facility]  Progress Note                   Date:   3/1/2022  Patient name: Ariel Galvan  Date of admission:  2/26/2022  9:07 AM  MRN:   [id number]  YOB: 1960  PCP: MARCI Rhodes CNP    Reason for Admission: Shortness of breath [R06.02]  Hypoxia [R09.02]  Acute on chronic combined systolic (congestive) and diastolic (congestive) heart failure ([address] [I50.43]    Subjective:       Clinical Changes /Abnormalities: Pt. Seen & examined in room. No acute CV issues/concerns overnight. Labs, vitals, & tele reviewed. Echo completed this AM. Remains Afib with rates current 110s-120 but patient is frustrated and wants to go home. HR had been stable 90s    Review of Systems    Medications:   Scheduled Meds:   [START ON 3/2/2022] predniSONE  40 mg Oral Daily    levofloxacin  750 mg IntraVENous Q24H    furosemide  20 mg IntraVENous Daily    atorvastatin  40 mg Oral Nightly    acetylcysteine  600 mg Inhalation 4x daily    albuterol  2.5 mg Nebulization 4x daily    rivaroxaban  20 mg Oral Daily    sodium chloride flush  5-40 mL IntraVENous 2 times per day    metoprolol succinate  50 mg Oral Daily    budesonide-formoterol  2 puff Inhalation BID    aspirin  81 mg Oral Daily     Continuous Infusions:   sodium chloride Stopped (03/01/22 1042)     CBC:   Recent Labs     02/27/22  0525 02/28/22  0445 03/01/22  0547   WBC 8.9 9.0 9.1   HGB 14.6 14.6 15.4    214 246     BMP:    Recent Labs     02/28/22  0445 02/28/22  1519 03/01/22  0547    133* 139   K 4.8 4.4 5.1    98 103   CO2 24 23 22   BUN 19 22 24*   CREATININE 0.61* 0.69* 0.75   GLUCOSE 142* 296* 172*     Hepatic:No results for input(s): AST, ALT, ALB, BILITOT, ALKPHOS in the last 72 hours. Troponin: No results for input(s): TROPHS in the last 72 hours. BNP: No results for input(s): BNP in the last 72 hours.   Lipids:   Recent Labs     02/27/22  0525   CHOL 206*   HDL 61     INR: No results for input(s): INR in the last 72 hours. Objective:   Vitals: BP (!) 127/90   Pulse 94   Temp 98.2 °F (36.8 °C) (Oral)   Resp 16   Ht 5' 6\" (1.676 m)   Wt 175 lb 6.4 oz (79.6 kg)   SpO2 91%   BMI 28.31 kg/m²   General appearance: alert and cooperative with exam  HEENT: Head: Normocephalic, no lesions, without obvious abnormality. Neck:no JVD, trachea midline, no adenopathy  Lungs: Clear to auscultation  Heart: Regular rate and rhythm, s1/s2 auscultated, no murmurs  Abdomen: soft, non-tender, bowel sounds active  Extremities: no edema  Neurologic: not done    HOLTER 11/10/2021:   1. Atrial fibrillation with rates from 63 to 106 bpm.  2. Occasional Premature ventricular complexes with pairs and triplets  3. No pauses     ECHO 11/10/2021: EF 25-30%, grade III DD, false tendon seen in the mid LV, trivial AI, mild MR/TR, RVSP is 24 mmHg.      STRESS 11/10/2021: No ischemia/infarct. EF 40%.      CATH 1/23/18:    1. Low normal LV function   2. Multivessel CAD   3. Detailed discussion with patient about PCI versus CABG. Patient preferred PCI and proceeded accordingly   4. Successful NOHEMY of distal Ramus   5. Successful NOHEMY of proximal LAD    Assessment / Acute Cardiac Problems:   Afib with RVR  Rhino viurs infection  Acute on chronic systolic CHF  CAD s/p PCI of LAD and ramus  ICM  HTN  Prior alcohol use    Patient Active Problem List:     Chest pain     Atrial fibrillation (HCC)     CHF (congestive heart failure) (HCC)     SOB (shortness of breath)     Ischemic cardiomyopathy     Essential (primary) hypertension     Diabetes mellitus ([address]     Atherosclerotic heart disease of native coronary artery without angina pectoris     Alcoholism /alcohol abuse     Unstable angina (HCC)     Acute on chronic combined systolic (congestive) and diastolic (congestive) heart failure ([address]     Upper respiratory infection, viral      Plan of Treatment:   1. Stable. Wants to go home. Denies any CP or SOB.  HR remains Afib (chronic) with rates 90-110s. Continue PO Xarelto & BB (increase to 75mg daily). F/U with established cardiology [facility]) as OP  2. Systolic CHF - records reviewed from recent EP visit as OP with [facility]. Will resume Lisinopril and Aldactone as recommended. Continue PO Toprol XL. Discussed importance of fluid restrictions and monitoring PO fluid intake. 3. F/U [facility] in 2 weeks.      Electronically signed by MARCI Beaulieu CNP on 3/1/2022 at [address] [facility].  [phone]

## 2022-03-01 NOTE — PROGRESS NOTES
[facility] [address]  Occupational Therapy Not Seen Note    DATE: 3/1/2022    NAME: Toro Solano  MRN: [id number]   : 1960      Patient not seen this date for Occupational Therapy due to: Increased  at rest, 120's w/sitting at EOB and 130's when standing.       Next Scheduled Treatment: 3/2/22      Electronically signed by JACQUE Asher on 3/1/2022 at 2:33 PM

## 2022-03-01 NOTE — FLOWSHEET NOTE
Pt  Refused all breathing tx. Pt states that his lips are sore and swollen and he believes that its from the tx. No complications and no signs of distress.

## 2022-03-01 NOTE — PLAN OF CARE
Problem: Falls - Risk of:  Goal: Will remain free from falls  Description: Will remain free from falls  Outcome: Completed  Goal: Absence of physical injury  Description: Absence of physical injury  Outcome: Completed     Problem: Discharge Planning:  Goal: Participates in care planning  Description: Participates in care planning  Outcome: Completed  Goal: Discharged to appropriate level of care  Description: Discharged to appropriate level of care  Outcome: Completed     Problem:  Activity Intolerance:  Goal: Ability to tolerate increased activity will improve  Description: Ability to tolerate increased activity will improve  Outcome: Completed     Problem: Anxiety/Stress:  Goal: Level of anxiety will decrease  Description: Level of anxiety will decrease  Outcome: Completed     Problem: Nutrition Deficit:  Goal: Ability to achieve adequate nutritional intake will improve  Description: Ability to achieve adequate nutritional intake will improve  Outcome: Completed     Problem: Pain:  Goal: Pain level will decrease  Description: Pain level will decrease  Outcome: Completed  Goal: Ability to notify healthcare provider of pain before it becomes unmanageable or unbearable will improve  Description: Ability to notify healthcare provider of pain before it becomes unmanageable or unbearable will improve  Outcome: Completed  Goal: Control of acute pain  Description: Control of acute pain  Outcome: Completed  Goal: Control of chronic pain  Description: Control of chronic pain  Outcome: Completed     Problem: Serum Glucose Level - Abnormal:  Goal: Ability to maintain appropriate glucose levels will improve to within specified parameters  Description: Ability to maintain appropriate glucose levels will improve to within specified parameters  Outcome: Completed     Problem: Skin Integrity - Impaired:  Goal: Will show no infection signs and symptoms  Description: Will show no infection signs and symptoms  Outcome: Completed  Goal: Absence of new skin breakdown  Description: Absence of new skin breakdown  Outcome: Completed     Problem: Sleep Pattern Disturbance:  Goal: Appears well-rested  Description: Appears well-rested  Outcome: Completed     Problem:  Activity:  Goal: Ability to tolerate increased activity will improve  Description: Ability to tolerate increased activity will improve  Outcome: Completed  Goal: Expression of feelings of increased energy will increase  Description: Expression of feelings of increased energy will increase  Outcome: Completed     Problem: Cardiac:  Goal: Ability to maintain an adequate cardiac output will improve  Description: Ability to maintain an adequate cardiac output will improve  Outcome: Completed  Goal: Complications related to the disease process, condition or treatment will be avoided or minimized  Description: Complications related to the disease process, condition or treatment will be avoided or minimized  Outcome: Completed     Problem: Coping:  Goal: Level of anxiety will decrease  Description: Level of anxiety will decrease  Outcome: Completed  Goal: General experience of comfort will improve  Description: General experience of comfort will improve  Outcome: Completed     Problem: Health Behavior:  Goal: Ability to manage health-related needs will improve  Description: Ability to manage health-related needs will improve  Outcome: Completed     Problem: Safety:  Goal: Ability to remain free from injury will improve  Description: Ability to remain free from injury will improve  Outcome: Completed  Goal: Will show no signs and symptoms of excessive bleeding  Description: Will show no signs and symptoms of excessive bleeding  Outcome: Completed

## 2022-03-01 NOTE — PROGRESS NOTES
Home Oxygen Evaluation    Home Oxygen Evaluation completed. Patient is on 2 liters per minute via nasal cannula. Resting SpO2 = 93%  Resting SpO2 on room air = 92%    SpO2 on room air with exercise = 93%      Nocturnal Oximetry with patient on room air is recommended is SpO2 is between 89% and 95% (requires additional order).     Kaveh Celaya RCP  3:49 PM

## 2022-03-01 NOTE — PROGRESS NOTES
PULMONARY & CRITICAL CARE MEDICINE CONSULT NOTE     Patient:  Andrea Shannon  MRN: [id number]  Admit date: 2/26/2022  Primary Care Physician: MARCI Langston CNP  Consulting Physician: No att. providers found    HISTORY     CHIEF COMPLAINT/REASON FOR CONSULT: Atelectasis, assess the need for bronch    Subjective:    Patient seen and examined in his room. On 2 L oxygen via nasal cannula and tachycardic with heart rate in 100s. Reviewed repeat chest x-ray. Unchanged from previous x-ray. HISTORY OF PRESENT ILLNESS:    The patient is a 57 y.o. male presenting with shortness of breath, sudden onset associated with productive cough yellow-colored sputum. He has past medical history of CHFrEF (EF 59-23%, grade 3 diastolic dysfunction), ICM, multivessel CAD s/p NOHEMY 2018, A. fib on Xarelto. Patient was hypoxic desatted into 80s was placed on 6 L oxygen in the ED. Had diffuse wheezes on auscultation bilaterally. EKG revealed A. fib with RVR heart rate 120s. Chest x-ray was unremarkable on admission. CT PE revealed partial collapse of anterior basal segment of left lower lobe likely secondary to mucous plugging    PAST MEDICAL HISTORY:        Diagnosis Date    Atrial fibrillation (HCC)     CAD (coronary artery disease)     CHF (congestive heart failure) (HCC) 2/10/2015    EF 15-20%     Hyperlipidemia     Hypertension     S/P cardiac cath 01/23/2018     PAST SURGICAL HISTORY:        Procedure Laterality Date    ABLATION OF DYSRHYTHMIC FOCUS      CORONARY ANGIOPLASTY WITH STENT PLACEMENT  01/23/2018    X 2    HERNIA REPAIR      TONSILLECTOMY      VASECTOMY  [id number]     FAMILY HISTORY:       Problem Relation Age of Onset    Cancer Mother         breast metastatic    Heart Disease Father      SOCIAL HISTORY:   TOBACCO:   Current smoker  ETOH: Occasional alcohol use  DRUGS: reports no history of drug use.   AVOCATION/OCCUPATIONAL EXPOSURE:    The patient denies asbestos, silica dust, coal, foundry, quarry or [employer] exposure. The patient denies  to having pet dogs, cats, turtles or exotic birds at home. There is no history of TB or TB exposure. There is no exposure to sick contacts. Travel history is not significant history of risk factors for pulmonary disease. The patient denies using [employer]. ALLERGIES:    Allergies   Allergen Reactions    Codeine Other (See Comments)     \"upset stomach\"         HOME MEDICATIONS:  Prior to Admission medications    Medication Sig Start Date End Date Taking? Authorizing Provider   aspirin 81 MG chewable tablet Take 1 tablet by mouth daily 3/2/22  Yes MARCI Vazquez CNP   atorvastatin (LIPITOR) 40 MG tablet Take 1 tablet by mouth nightly 3/1/22  Yes MARCI Vazquez CNP   metoprolol succinate (TOPROL XL) 25 MG extended release tablet Take 3 tablets by mouth daily 3/2/22  Yes MARCI Vazquez CNP   spironolactone (ALDACTONE) 25 MG tablet Take 0.5 tablets by mouth daily 3/1/22  Yes MARCI Vazquez CNP   budesonide-formoterol (SYMBICORT) 80-4.5 MCG/ACT AERO Inhale 2 puffs into the lungs 2 times daily 3/1/22  Yes Lorena Carrasco MD   levoFLOXacin (LEVAQUIN) 750 MG tablet Take 1 tablet by mouth daily for 5 days 3/1/22 3/6/22 Yes Lorena Carrasco MD   predniSONE (DELTASONE) 20 MG tablet Take 2 tablets by mouth daily for 5 doses 3/2/22 3/7/22 Yes Lorena Carrasco MD   rivaroxaban (XARELTO) 20 MG TABS tablet Take 20 mg by mouth Daily with supper   Yes Historical Provider, MD   lisinopril (PRINIVIL;ZESTRIL) 5 MG tablet Take 5 mg by mouth daily   Yes Historical Provider, MD   nitroGLYCERIN (NITROSTAT) 0.4 MG SL tablet Place 0.4 mg under the tongue every 5 minutes as needed for Chest pain up to max of 3 total doses. If no relief after 1 dose, call 911.     Historical Provider, MD     IMMUNIZATIONS:  Most Recent Immunizations   Administered Date(s) Administered    Pneumococcal Polysaccharide ([id number]) 2015    Tdap (Boostrix, Adacel) 2017       REVIEW OF SYSTEMS:  General: negative for chills, fatigue or fever  ENT: negative for headaches, nasal congestion, sore throat or visual changes  Allergy and Immunology: negative for postnasal drip or seasonal allergies  Hematological and Lymphatic: negative for bleeding problems, swollen lymph nodes  Respiratory: positive for cough and shortness of breath negative  Cardiovascular: negative for edema or palpitations  Gastrointestinal: negative for abdominal pain, change in bowel habits or nausea/vomiting  Genito-Urinary: negative for dysuria or urinary frequency/urgency  Musculoskeletal: negative for joint pain or joint swelling  Neurological: negative for numbness/tingling, seizures or weakness  Dermatological: negative for pruritus or rash    PHYSICAL EXAMINATION     VITAL SIGNS:   LAST-  BP (!) 133/95   Pulse 114   Temp 98.8 °F (37.1 °C) (Oral)   Resp 19   Ht 5' 6\" (1.676 m)   Wt 175 lb 6.4 oz (79.6 kg)   SpO2 94%   BMI 28.31 kg/m²   8-24 HR RANGE-  TEMP Temp  Av.2 °F (36.8 °C)  Min: 98 °F (36.7 °C)  Max: 98.8 °F (99.7 °C)   BP Systolic (44EUT), WKF:799 , Min:120 , LS      Diastolic (11BUL), MDN:03, Min:69, Max:96     PULSE Pulse  Av.5  Min: 76  Max: 114   RR Resp  Av.7  Min: 16  Max: 27   O2 SAT SpO2  Av.7 %  Min: 91 %  Max: 94 %   OXYGEN DELIVERY O2 Flow Rate (L/min)  Av L/min  Min: 2 L/min  Max: 2 L/min     SYSTEMIC EXAMINATION:   General appearance - well appearing, overweight, comfortable and in no acute distress  Mental status - alert, oriented to person, place, and time  Eyes - pupils equal and reactive, extraocular eye movements intact, sclera anicteric  Ears - not examined  Nose - normal and patent, no erythema, discharge  Mouth - mucous membranes moist, pharynx normal without lesions  Neck - supple, no significant adenopathy, carotids upstroke normal bilaterally, no bruits  Lymphatics - no palpable lymphadenopathy, no hepatosplenomegaly  Chest -  decreased air entry noted left lower lung field  Heart - normal rate, regular rhythm, normal S1, S2, no murmurs, rubs, clicks or gallops  Abdomen - soft, nontender, nondistended, no masses or organomegaly  Neurological - motor and sensory grossly normal bilaterally  Musculoskeletal - no joint tenderness, deformity or swelling  Extremities - peripheral pulses normal, no pedal edema, no clubbing or cyanosis  Skin - normal coloration and turgor, no rashes, no suspicious skin lesions noted    DATA REVIEW     Medications: Current Inpatient  Scheduled Meds:   [START ON 3/2/2022] predniSONE  40 mg Oral Daily    spironolactone  12.5 mg Oral Daily    lisinopril  5 mg Oral Daily    [START ON 3/2/2022] metoprolol succinate  75 mg Oral Daily    levofloxacin  750 mg IntraVENous Q24H    atorvastatin  40 mg Oral Nightly    acetylcysteine  600 mg Inhalation 4x daily    albuterol  2.5 mg Nebulization 4x daily    rivaroxaban  20 mg Oral Daily    sodium chloride flush  5-40 mL IntraVENous 2 times per day    budesonide-formoterol  2 puff Inhalation BID    aspirin  81 mg Oral Daily     Continuous Infusions:   sodium chloride Stopped (03/01/22 1042)     INPUT/OUTPUT:  In: 3287 [P.O.:1687]  Out: 3622 [Urine:3525]    LABS:-  ABGs:   No results found for: PH, PCO2, PO2, HCO3, O2SAT  No results for input(s): PHART, PO2ART, HLJ2FAG, SGK6ARG, BEART, I5AOMOJZ in the last 72 hours.   No results found for: POCPH, POCPCO2, POCPO2, POCHCO3, LGWD5HUW  CBC:   Recent Labs     02/27/22  0525 02/28/22  0445 03/01/22  0547   WBC 8.9 9.0 9.1   HGB 14.6 14.6 15.4   HCT 43.5 43.3 47.3   MCV 95.0 95.8 98.3    214 246   LYMPHOPCT 5* 7* 6*   RBC 4.58 4.52 4.81   MCH 31.9 32.3 32.0   MCHC 33.6 33.7 32.6   RDW 12.2 12.4 12.3     BMP:   Recent Labs     02/28/22  0445 02/28/22  1519 03/01/22  0547    133* 139   K 4.8 4.4 5.1    98 103   CO2 24 23 22   BUN 19 22 24*   CREATININE 0.61* 0.69* 0.75   GLUCOSE 142* 296* 172*     Liver Function Test:   No results for input(s): PROT, LABALBU, ALT, AST, GGT, ALKPHOS, BILITOT in the last 72 hours. Amylase/Lipase:  No results for input(s): AMYLASE, LIPASE in the last 72 hours. Coagulation Profile:   No results for input(s): INR, PROTIME, APTT in the last 72 hours. Cardiac Enzymes:  No results for input(s): CKTOTAL, CKMB, CKMBINDEX, TROPONINI in the last 72 hours. Lactic Acid:  No results found for: LACTA  BNP:   No results found for: BNP  D-Dimer:  Lab Results   Component Value Date    DDIMER 0.56 02/26/2022     Others:   Lab Results   Component Value Date    TSH 2.55 02/26/2022     No results found for: PELON, RHEUMFACTOR, SEDRATE, CRP  No results found for: Corita Hernandez  No results found for: IRON, TIBC, FERRITIN  No results found for: SPEP, UPEP  Lab Results   Component Value Date    PSA 1.58 11/17/2020     Microbiology:    Pathology:    Radiology:  XR CHEST PORTABLE    Result Date: 2/27/2022  EXAMINATION: ONE XRAY VIEW OF THE CHEST 2/27/2022 11:10 am COMPARISON: 02/26/2022 HISTORY: ORDERING SYSTEM PROVIDED HISTORY: hypoxia TECHNOLOGIST PROVIDED HISTORY: hypoxia FINDINGS: The lungs appear clear. There are no pulmonary nodules, masses or infiltrates. The heart and mediastinal structures appear normal.  Bony structures appear normal.     No active cardiopulmonary disease. XR CHEST PORTABLE    Result Date: 2/26/2022  EXAMINATION: ONE XRAY VIEW OF THE CHEST 2/26/2022 9:21 am COMPARISON: 01/02/2018 HISTORY: ORDERING SYSTEM PROVIDED HISTORY: sob TECHNOLOGIST PROVIDED HISTORY: sob Reason for Exam: sob Wall FINDINGS: The lungs are without acute focal process. There is no effusion or pneumothorax. The cardiomediastinal silhouette is without acute process. The osseous structures are without acute process. There is no change from prior examination. No acute process.      CT CHEST PULMONARY EMBOLISM W CONTRAST    Result Date: 2/27/2022  EXAMINATION: CTA OF THE CHEST 2/27/2022 3:12 pm TECHNIQUE: CTA of the chest was performed after the administration of intravenous contrast.  Multiplanar reformatted images are provided for review. MIP images are provided for review. Dose modulation, iterative reconstruction, and/or weight based adjustment of the mA/kV was utilized to reduce the radiation dose to as low as reasonably achievable. COMPARISON: 02/27/2022 HISTORY: ORDERING SYSTEM PROVIDED HISTORY: r/o PE TECHNOLOGIST PROVIDED HISTORY: r/o PE Reason for Exam: short of breath FINDINGS: Pulmonary Arteries: There is no acute pulmonary thromboembolus. Mediastinum: Coronary artery calcifications are a marker of atherosclerosis. There are no enlarged thoracic lymph nodes. Lungs/pleura: Mucous plugging is present within the subsegmental lower lobe bronchi causing partial collapse of the anterior basal segment of the left lower lobe. There is no pneumothorax or pleural effusion. There is bibasilar atelectasis. Upper Abdomen: A small hiatal hernia is noted. A simple left upper pole renal cyst is noted. Soft Tissues/Bones: Degenerative changes involve the thoracic spine and bilateral glenohumeral joints. 1. Partial collapse of the anterior basal segment of the left lower lobe likely due to mucous plugging.        Pulmonary Function test:    Polysomnogram:    Echocardiogram:   Results for orders placed during the hospital encounter of 11/10/21    ECHO Complete 2D W Doppler W Color    Narrative  Transthoracic Echocardiography Report (TTE)    Patient Name Cristobal Kirby     Date of Study               11/10/2021  Scout BRYANT    Date of      1960  Gender                      Male  Birth    Age          57 year(s)  Race                            Room Number              Height:                     66 inch, 167.64 cm    Corporate ID [id number]    Weight:                     175 pounds, 79.4 kg  #    Patient Acct [de-identified]   BSA:          1.89 m^2 BMI:     28.25 kg/m^2  #    MR #         [id number]     Sonographer                 Matheus Justin    Accession #  [id number]  Interpreting Physician      [address] [place] [address]    Fellow                   Referring Nurse  Practitioner    Interpreting             Referring Physician         Hussein BUCHANAN    Type of Study    TTE procedure:2D Echocardiogram, M-Mode, Doppler, Color Doppler. Procedure Date  Date: 11/10/2021 Start: 10:48 AM    Study Location: [facility]  Technical Quality: Adequate visualization    Indications:Chest pain. History / Tech. Comments:  Procedure explained to patient. Echo completed in the Echo Lab. PMHx:  Atrial fibrillation, Hypertension  Coronary artery disease with stent    Patient Status: Outpatient    Height: 66 inches Weight: 175 pounds BSA: 1.89 m^2 BMI: 28.25 kg/m^2    Allergies  - *No Known Allergies. CONCLUSIONS    Summary  Left ventricle is normal in size, global left ventricular systolic function  is severely reduced, visually estimated LVEF 25-30%. Abnormal global L. strain of -3.1 %. Evidence of severe (grade III) diastolic dysfunction. False tendon seen in the mid left ventricle. Aortic valve is sclerotic but opens well. Trivial aortic insufficiency. Mitral valve sclerosis without stenosis. Mild mitral regurgitation. Mild tricuspid regurgitation. Estimated right ventricular systolic pressure  is 24 mmHg.     Signature  ----------------------------------------------------------------------------  Electronically signed by Britni Wasserman(Sonographer) on 11/10/2021 11:33 AM  ----------------------------------------------------------------------------    ----------------------------------------------------------------------------  Electronically signed by [facility]) on 11/10/2021  11:46 AM  ----------------------------------------------------------------------------  FINDINGS  Left Atrium  Left atrium is normal in size.  Inter-atrial septum is intact with no evidence for an atrial septal defect,  by color doppler. Left Ventricle  Left ventricle is normal in size, global left ventricular systolic function  is severely reduced, visually estimated LVEF 25-30%. Abnormal global L. strain of -3.1 %. Evidence of severe (grade III) diastolic dysfunction. False tendon seen in the mid left ventricle. Right Atrium  Right atrium is normal in size. Right Ventricle  Normal right ventricular size and function. Mitral Valve  Mitral valve sclerosis without stenosis. Mild mitral regurgitation. Aortic Valve  Aortic valve is trileaflet. Aortic valve is sclerotic but opens well. Trivial aortic insufficiency. Tricuspid Valve  Tricuspid valve was not well visualized. Mild tricuspid regurgitation. Estimated right ventricular systolic pressure is 24 mmHg. Pulmonic Valve  Pulmonic valve not well visualized but Doppler velocities are normal.  Pericardial Effusion  No significant pericardial effusion is seen. Miscellaneous  Normal aortic root dimension. E/E' average = 24.2. IVC normal diameter & inspiratory collapse indicating normal RA filling  pressure .     M-mode / 2D Measurements & Calculations:    LVIDd:5.6 cm(3.7 - 5.6 cm)        Diastolic OVJTIA:23.0 ml  BBYH:9.3 cm(0.6 - 1.1 cm)         Systolic TXNQVP:72.4 ml  KKPGM:7.8 cm(0.6 - 1.1 cm)        Aortic Root:2.8 cm(2.0 - 3.7 cm)  LA Dimension: 3.5 cm(1.9 - 4.0 cm)  Calculated LVEF (%): 33.91 %      LA volume/Index: 31.56 ml /17m^2  LVOT:2.1 cm  RVDd:2.9 cm    Mitral:                                 Aortic    Valve Area (P1/2-Time): 6.47 cm^2       Peak Velocity: 0.99 m/s  Peak E-Wave: 1.14 m/s                   Mean Velocity: 0.77 m/s  Peak A-Wave: 0.33 m/s                   Peak Gradient: 3.93 mmHg  E/A Ratio: 3.41                         Mean Gradient: 3 mmHg  Peak Gradient: 5.2 mmHg  Mean Gradient: 2 mmHg  Deceleration Time: 104 msec             Area (continuity): 2.69 cm^2  P1/2t: 34 msec                          AV VTI: 19.8 cm    Area (continuity): 3.08 cm^2  Mean Velocity: 0.73 m/s    Tricuspid:                              Pulmonic:    Estimated RVSP: 24 mmHg                 Peak Velocity: 0.84 m/s  Peak TR Velocity: 2.21 m/s              Peak Gradient: 2.8 mmHg  Peak TR Gradient: 19.5364 mmHg    Diastology / Tissue Doppler  Septal Wall E' velocity:0.03 m/s  Septal Wall E/E':35  Lateral Wall E' velocity:0.08 m/s  Lateral Wall E/E':13.4    No results found for this or any previous visit. Cardiac Catheterization:   No results found for this or any previous visit. ASSESSMENT AND PLAN     Assessment:    Acute hypoxic respiratory failure: Secondary to combined CHF exacerbation and left lung basal segment atelectasis    Upper respiratory tract infection positive for rhino/enterovirus      Plan:    I personally interviewed/examined the patient; reviewed interval history, interpreted all available radiographic and laboratory data at the time of service. Patient remains hemodynamically stable and is currently saturating well on nasal cannula. Reviewed repeat chest x-ray showing subsegmental atelectasis. Unchanged from previous x-ray. No history of COPD. Does have exposure to secondhand smoking  Has CHF with EF 25-30%  A. fib on Xarelto    Patient requiring 2 L oxygen via nasal cannula heart rate in 100s today. Inform primary  Recommend home oxygen evaluation prior to discharge  To follow-up with pulmonology outpatient with CT scan in 6 weeks  Primary planning for discharge today    I updated the patient regarding the current clinical condition, provisional diagnosis and management plan. He verbalized a clear understanding and I addressed his concerns, and answered all questions to the best of my abilities. It was my pleasure to evaluate Melissa Cancer today. We will continue to follow.  I would like to thank you for allowing me to participate in the care of this patient. Please feel free to call with any further questions or concerns. Radha Jose MD  PGY-3, Internal Medicine Resident  [id number]  3/1/2022 6:34 PM    Please note that this chart was generated using voice recognition Dragon dictation software. Although every effort was made to ensure the accuracy of this automated transcription, some errors in transcription may have occurred. Attending Physician Statement  I have discussed the care of Melissa Cancer, including pertinent history and exam findings with the resident. I have reviewed the key elements of all parts of the encounter with the resident. I have seen and examined the patient with the resident. I agree with the assessment and plan and status of the problem list as documented. Overnight events noted. Patient had no episode of shortness of breath. He remains on 1-1/2 L of nasal cannula. He does not complain of significant cough. He is able to ambulate. According to patient he has history of low oxygen for long time in the past he had no work-up but he was told that his saturations stayed 92 to 93% in the past had not had any work-up. He is maintaining saturation above 94 to 96% on 1-1/2 L nasal cannula. He remained tachycardic with heart rate is in 115-120 and has been seen and followed by cardiology on Xarelto currently. He had received digoxin. Chest x-ray PA lateral view shows better aeration of left lung shows mild area of lingular atelectasis. He will need follow-up CT scan of the chest in 4 to 6 weeks. He will need outpatient pulmonary function test and 6-minute walk test and follow-up in office. Heart rate control per cardiology. He will need home O2 evaluation before discharge. Continue with current antibiotic therapy. Doubt need for steroids from pulmonary standpoint.   Optimization of treatment for CHF and follow-up with cardiology      Discussed with nursing staff, treatment and plan discussed. Please note that this chart was generated using voice recognition Dragon dictation software. Although every effort was made to ensure the accuracy of this automated transcription, some errors in transcription may have occurred.      Lang Whitney MD  3/1/2022 7:18 PM

## 2022-03-01 NOTE — PROGRESS NOTES
[facility]  Internal Medicine Teaching Residency Program  Inpatient Daily Progress Note  ______________________________________________________________________________    Patient: Radha Engel  YOB: 1960   ZBE:[id number]    Acct: [de-identified]     Room: [address] [place] [address]  Admit date: 2/26/2022  Today's date: 03/01/22  Number of days in the hospital: 3    SUBJECTIVE   Admitting Diagnosis: Acute on chronic combined systolic (congestive) and diastolic (congestive) heart failure (HCC)  CC: Shortness of breath    - Pt examined at bedside. Chart & results reviewed. - No acute events overnight  - Patient currently resting comfortably in bed  - Patient denies any shortness of breath or chest pain  - Patient is currently in A. Fib > rate control  - Afebrile  - Vital signs stable    Plan for today:  - Awaitig pulmonology and cardiology recommendations regarding discharge. Once cleared by both services we will discharge patient. ROS:  Constitutional:  negative for chills, fevers, sweats  Respiratory:  negative for cough, dyspnea on exertion, hemoptysis, shortness of breath, wheezing  Cardiovascular:  negative for chest pain, chest pressure/discomfort, lower extremity edema, palpitations  Gastrointestinal:  negative for abdominal pain, constipation, diarrhea, nausea, vomiting  Neurological:  negative for dizziness, headache    BRIEF HISTORY     The patient is a pleasant 61 y. o. male presents with a chief complaint of shortness of breath last night suddenly.  He has had a cough ongoing for sputum production whitish yellow-colored. He reported that his wife has been sick.  He is not vaccinated with Covid. Patient has a PMH significant of CHFrEF (Echo 11/2021: LVEF 25-30%, grade III diastolic dysfunction), ischemic cardiomyopathy (S/p stress test 11/2021: intermediate risk- no stress induced ischemia; cath in 01/2018: multivessel CAD-  s/p NOHEMY- distal ramus and proximal LAD), and A.fib (on Toprolol 50 mg daily and Xarelto). He denies chest pain, palpitations, fever, chills, weakness, abdominal pain, nausea or vomiting.      In the ER, patient was hypertensive /115, tachycardia 125, tachypnea 28, SpO2 90% on room air initially however he became hypoxic with static sats in the 80s and was placed on 6 L oxygen.  On exam, patient is alert awake oriented x4, speaking in full sentences. Lung exam has diffuse wheezing bilaterally.  On EKG, patient was in A. fib RVR with HR in the 120s.  In the ER he received Cardizem 20 mg IV bolus that controlled rate.  He also received lasix 20 mg IV and 2 duoneb breathing treatment.  CXR showed no acute processes.  Labs included, creatinine 1.57, pro-Arian 0.20, proBNP 723, CBC within normal limits, D-dimer 0.57, rhino/enterovirus PCR is positive.  Covid test negative.  Patient is admitted for acute hypoxic respiratory failure likely secondary to CHF exacerbation.     OBJECTIVE     Vital Signs:  /85   Pulse 78   Temp 98 °F (36.7 °C) (Oral)   Resp 20   Ht 5' 6\" (1.676 m)   Wt 175 lb 6.4 oz (79.6 kg)   SpO2 96%   BMI 28.31 kg/m²     Temp (24hrs), Av °F (36.7 °C), Min:97.7 °F (36.5 °C), Max:98.3 °F (36.8 °C)    In: 840   Out: 1925 [Urine:1925]    Physical Exam:  Physical Exam  Vitals and nursing note reviewed. Constitutional:       Appearance: Normal appearance. HENT:      Head: Normocephalic and atraumatic. Nose: Nose normal.      Mouth/Throat:      Mouth: Mucous membranes are moist.      Pharynx: Oropharynx is clear. Eyes:      Extraocular Movements: Extraocular movements intact. Conjunctiva/sclera: Conjunctivae normal.      Pupils: Pupils are equal, round, and reactive to light. Cardiovascular:      Rate and Rhythm: Tachycardia present. Rhythm irregular. Pulses: Normal pulses. Heart sounds: Normal heart sounds. No murmur heard. No friction rub. No gallop.     Pulmonary:      Effort: Pulmonary effort is PRN  acetaminophen, 650 mg, Q6H PRN   Or  acetaminophen, 650 mg, Q6H PRN        Diagnostic Labs:  CBC:   Recent Labs     02/27/22  0525 02/28/22  0445 03/01/22  0547   WBC 8.9 9.0 9.1   RBC 4.58 4.52 4.81   HGB 14.6 14.6 15.4   HCT 43.5 43.3 47.3   MCV 95.0 95.8 98.3   RDW 12.2 12.4 12.3    214 246     BMP:   Recent Labs     02/28/22  0445 02/28/22  1519 03/01/22  0547    133* 139   K 4.8 4.4 5.1    98 103   CO2 24 23 22   BUN 19 22 24*   CREATININE 0.61* 0.69* 0.75     BNP: No results for input(s): BNP in the last 72 hours. PT/INR: No results for input(s): PROTIME, INR in the last 72 hours. APTT: No results for input(s): APTT in the last 72 hours. CARDIAC ENZYMES: No results for input(s): CKMB, CKMBINDEX, TROPONINI in the last 72 hours. Invalid input(s): CKTOTAL;3  FASTING LIPID PANEL:  Lab Results   Component Value Date    CHOL 206 (H) 02/27/2022    HDL 61 02/27/2022    TRIG 56 02/27/2022     LIVER PROFILE: No results for input(s): AST, ALT, ALB, BILIDIR, BILITOT, ALKPHOS in the last 72 hours. MICROBIOLOGY: No results found for: CULTURE    Imaging:    XR CHEST PORTABLE    Result Date: 2/27/2022  No active cardiopulmonary disease. XR CHEST PORTABLE    Result Date: 2/26/2022  No acute process. CT CHEST PULMONARY EMBOLISM W CONTRAST    Result Date: 2/27/2022  1. Partial collapse of the anterior basal segment of the left lower lobe likely due to mucous plugging. ASSESSMENT & PLAN     Assessment and Plan:    Principal Problem:    Acute on chronic combined systolic (congestive) and diastolic (congestive) heart failure (HCC)  Active Problems:    Atrial fibrillation (HCC)    CHF (congestive heart failure) (HCC)    Upper respiratory infection, viral  Resolved Problems:    * No resolved hospital problems. *        IMPRESSION  This is a 58 y. o. male who presented with shortness of breath and found to have Acute hypoxic respiratory failure secondary to acute on chronic combined systolic and diastolic heart failure.       1. Acute hypoxic respiratory failure secondary to acute on chronic combined systolic and diastolic heart failure (Echo 11/2021: LVEF 25-30%, grade III diastolic dysfunction) Vs. Left lower lung collapse on CT Scan from mucous plugging  - on 2.0 L NC oxygen, saturating well  - Received Lasix 20 mg IV in the ER  - Monitor I's and O's  - On Solu-Medrol 40 mg IV daily for 5 days  - Continue Symbicort twice daily and DuoNeb as needed for wheezing  - Supplemental oxygen to maintain SPO2 > 92%  - Wean off oxygen as tolerated  - CT PE: negative for PE, partial collapse of anterior basal segment of the left lower lobe likely due to mucous plugging  -Pulmonology consulted,  Incentive spirometry, Acapella and chest physiotherapy. Defer bronc for now. Outpatient PFT. Continue IV steroids, and bronchodilators.       2. Atrial Fibrillation with RVR (HR 120s)  - Received Cardizem 20 mg IV bolus that controlled HR.  He also received lasix 20 mg IV and 2 duoneb breathing treatment  - Started on home meds: Toprol-XL 50 mg daily,  - Cardiology consulted: Digoxin 250 mcg IV x1 then 125 mcg q6 hours x2. Continue Xarelto and metoprolol. IV Lasix 20 mg IV once today. Fluid restriction- 1.2L      3. Upper Respiratory Infection  - Respiratory panel - positive for Rhino/Enterovirus  - Covid negative  - Levaquin 750 mg IV every 24 hours  - Supportive treatment     4. Ischemic cardiomyopathy (S/p stress test 11/2021: intermediate risk- no stress induced ischemia; cath in 01/2018: multivessel CAD-  s/p NOHEMY- distal ramus and proximal LAD  - Continue ASA, Lipitor  - Lipid profile - , Cholesterol 206; Lipitor dosage increased to 40 mg nightly  - Cardiology consulted: Digoxin 250 mcg IV x1 then 125 mcg q6 hours x2. Continue Xarelto and metoprolol. IV Lasix 20 mg IV once today.  Fluid restriction- 1.2L         DVT ppx: On Xarelto  GI ppx: Not indicated     PT/OT: On board  Discharge Planning: Discharge home independently with his wife upon discharge. Miriam Dye MD  Internal Medicine Resident, PGY-1  [facility];  [place], [place]   7:48 AM 3/1/2022

## 2022-03-02 ENCOUNTER — TELEPHONE (OUTPATIENT)
Dept: PULMONOLOGY | Age: 62
End: 2022-03-02

## 2022-03-02 NOTE — TELEPHONE ENCOUNTER
PHONE NUMBER ON FILE ([phone] ) IS A BAD NUMBER WHEN YOU CALL IT, IT GOES RIGHT TO A BUSY SIGNAL. WILL SEND A LETTER TO HOME ADDRESS FOR PT TO CALL THE OFFICE TO MAKE HIS APPT.

## 2022-03-02 NOTE — TELEPHONE ENCOUNTER
----- Message from Flor Tellez sent at 3/2/2022  7:06 AM EST -----  Wagner, please see message from Dr Viry Silva and call to schedule. Thank you.   ----- Message -----  From: Laurent Kirby MD  Sent: 3/1/2022   7:21 PM EST  To: [facility] Respiratory Spec Clinical Staff    He was seen in the hospital as an inpatient. He need follow-up with me in office in 4 weeks. Please make sure he get pulmonary function test on office visit.

## 2022-03-03 ENCOUNTER — TELEPHONE (OUTPATIENT)
Dept: FAMILY MEDICINE CLINIC | Age: 62
End: 2022-03-03

## 2022-03-03 NOTE — TELEPHONE ENCOUNTER
[facility] Initial Follow Up Call    Outreach made within 2 business days of discharge: Yes    Patient: Vee Agiulera Patient : 1960   MRN: [id number]  Reason for Admission: There are no discharge diagnoses documented for the most recent discharge. Discharge Date: 3/1/22       Spoke with: unable to reach patient, busy signal    Discharge department/facility: [facility] Interactive Patient Contact:  Was patient able to fill all prescriptions:  Was patient instructed to bring all medications to the follow-up visit:  Is patient taking all medications as directed in the discharge summary?    Does patient understand their discharge instructions:  Does patient have questions or concerns that need addressed prior to 7-14 day follow up office visit:    Scheduled appointment with PCP within 7-14 days    Follow Up  Future Appointments   Date Time Provider Poornima Hines   2022  9:30 AM MARCI Aggarwal - CNP [facility] SUHAIL WILDJODIE       [place], [place]

## 2022-03-11 ENCOUNTER — OFFICE VISIT (OUTPATIENT)
Dept: FAMILY MEDICINE CLINIC | Age: 62
End: 2022-03-11
Payer: COMMERCIAL

## 2022-03-11 VITALS
BODY MASS INDEX: 28.41 KG/M2 | DIASTOLIC BLOOD PRESSURE: 80 MMHG | WEIGHT: 176 LBS | HEART RATE: 71 BPM | SYSTOLIC BLOOD PRESSURE: 120 MMHG | OXYGEN SATURATION: 97 %

## 2022-03-11 DIAGNOSIS — I50.9 ACUTE ON CHRONIC CONGESTIVE HEART FAILURE, UNSPECIFIED HEART FAILURE TYPE (HCC): ICD-10-CM

## 2022-03-11 DIAGNOSIS — Z09 HOSPITAL DISCHARGE FOLLOW-UP: Primary | ICD-10-CM

## 2022-03-11 PROCEDURE — 1111F DSCHRG MED/CURRENT MED MERGE: CPT | Performed by: NURSE PRACTITIONER

## 2022-03-11 PROCEDURE — 1036F TOBACCO NON-USER: CPT | Performed by: NURSE PRACTITIONER

## 2022-03-11 PROCEDURE — 3017F COLORECTAL CA SCREEN DOC REV: CPT | Performed by: NURSE PRACTITIONER

## 2022-03-11 PROCEDURE — 99214 OFFICE O/P EST MOD 30 MIN: CPT | Performed by: NURSE PRACTITIONER

## 2022-03-11 PROCEDURE — G8484 FLU IMMUNIZE NO ADMIN: HCPCS | Performed by: NURSE PRACTITIONER

## 2022-03-11 PROCEDURE — G8427 DOCREV CUR MEDS BY ELIG CLIN: HCPCS | Performed by: NURSE PRACTITIONER

## 2022-03-11 PROCEDURE — G8419 CALC BMI OUT NRM PARAM NOF/U: HCPCS | Performed by: NURSE PRACTITIONER

## 2022-03-11 RX ORDER — BACITRACIN ZINC AND POLYMYXIN B SULFATE 500; 1000 [USP'U]/G; [USP'U]/G
OINTMENT TOPICAL
Qty: 1 EACH | Refills: 1 | Status: SHIPPED | OUTPATIENT
Start: 2022-03-11 | End: 2022-03-18

## 2022-03-11 ASSESSMENT — ENCOUNTER SYMPTOMS
COUGH: 0
SHORTNESS OF BREATH: 0

## 2022-03-11 NOTE — PROGRESS NOTES
inez Pickering (:  1960) is a 57 y.o. male,Established patient, here for evaluation of the following chief complaint(s):  Letter for School/Work         ASSESSMENT/PLAN:  1. Hospital discharge follow-up  2. Acute on chronic congestive heart failure, unspecified [facility] failure type [facility]      No follow-ups on file. Subjective   SUBJECTIVE/OBJECTIVE:  HPI   Pt is actually here for hospital follow up. Per dc summary:  Harriett Pickering is a 57 y.o. male who was admitted for the management of Acute on chronic combined systolic (congestive) and diastolic (congestive) heart failure ([facility] [place] [address] presented to the emergency department with shortness of breath. Patient stated he had a sudden onset of shortness of breath. Patient states he has had a cough ongoing with sputum production whitish/yellow in color. Patient was found to be hypertensive upon arrival to the ED with a blood pressure of 171/115. Patient was tachycardic, tachypneic and had an SPO2 saturation of 90% on room air initially. Patient saturation did drop into the 80s and he was placed on 6 L of oxygen. Patient remained alert and oriented and was able to speak in full sentences. Patient's lung exam showed diffuse wheezing bilaterally. EKG showed A. fib with RVR at a heart rate in the 120s. Patient was started on Cardizem 20 mg IV bolus and the rate became controlled. Chest x-ray showed no acute processes. Repeat chest x-ray on 2021 showed minor residual infiltrate left lung base. Lab work done in the emergency department showed a creatinine 1.57, pro-Arian of 0.20, proBNP 723, D-dimer 0.57, and CBC within normal limits. Patient is not vaccinated against COVID but tested negative in the emergency department. Patient was positive for rhino/enterovirus PCR. Patient was admitted for acute hypoxic respiratory failure likely secondary to CHF exacerbation. He does see cardio next week. Has a pulm.    Feels better, feels he is ready to return to work . Review of Systems   Constitutional: Negative for chills and fever. Respiratory: Negative for cough and shortness of breath. Cardiovascular: Negative for chest pain and leg swelling. Objective      Vitals:    03/11/22 1556   BP: 120/80   Pulse: 71   SpO2: 97%     Wt Readings from Last 3 Encounters:   03/11/22 176 lb (79.8 kg)   03/01/22 175 lb 6.4 oz (79.6 kg)   10/13/21 176 lb (79.8 kg)       Physical Exam  Constitutional:       General: He is not in acute distress. Appearance: He is well-developed. HENT:      Head: Normocephalic. Right Ear: External ear normal.      Left Ear: External ear normal.   Cardiovascular:      Rate and Rhythm: Normal rate and regular rhythm. Heart sounds: Normal heart sounds. Pulmonary:      Effort: Pulmonary effort is normal.      Breath sounds: Normal breath sounds. Musculoskeletal:         General: Normal range of motion. Skin:     General: Skin is warm and dry. Neurological:      Mental Status: He is alert and oriented to person, place, and time. An electronic signature was used to authenticate this note.     --Dayami Burgess, MARCI - CNP

## 2022-03-11 NOTE — PROGRESS NOTES
Patient is present needing letter for work  States he was at [facility]. V's 2/26-3/1 for hypoxia, SOB, Acute on chronic combined systolic (congestive) and diastolic (congestive) heart failure  States he would like to return to work on Monday; states his first day off work was 2/28    Patient states he has a spot on his upper lip  States it was draining

## 2022-04-08 ENCOUNTER — HOSPITAL ENCOUNTER (OUTPATIENT)
Dept: LAB | Age: 62
Setting detail: SPECIMEN
Discharge: HOME | End: 2022-04-08
Payer: COMMERCIAL

## 2022-04-08 DIAGNOSIS — Z20.822 COVID-19 RULED OUT BY LABORATORY TESTING: Primary | ICD-10-CM

## 2022-04-08 PROCEDURE — U0005 INFEC AGEN DETEC AMPLI PROBE: HCPCS

## 2022-04-08 PROCEDURE — U0003 INFECTIOUS AGENT DETECTION BY NUCLEIC ACID (DNA OR RNA); SEVERE ACUTE RESPIRATORY SYNDROME CORONAVIRUS 2 (SARS-COV-2) (CORONAVIRUS DISEASE [COVID-19]), AMPLIFIED PROBE TECHNIQUE, MAKING USE OF HIGH THROUGHPUT TECHNOLOGIES AS DESCRIBED BY CMS-2020-01-R: HCPCS

## 2022-04-09 LAB
SARS-COV-2: NORMAL
SARS-COV-2: NOT DETECTED
SOURCE: NORMAL

## 2022-04-12 ENCOUNTER — OFFICE VISIT (OUTPATIENT)
Dept: PULMONOLOGY | Age: 62
End: 2022-04-12
Payer: COMMERCIAL

## 2022-04-12 VITALS
RESPIRATION RATE: 18 BRPM | OXYGEN SATURATION: 95 % | BODY MASS INDEX: 27.32 KG/M2 | HEART RATE: 77 BPM | DIASTOLIC BLOOD PRESSURE: 82 MMHG | SYSTOLIC BLOOD PRESSURE: 108 MMHG | HEIGHT: 66 IN | WEIGHT: 170 LBS

## 2022-04-12 DIAGNOSIS — R06.02 SOB (SHORTNESS OF BREATH): Primary | ICD-10-CM

## 2022-04-12 DIAGNOSIS — J98.11 ATELECTASIS: ICD-10-CM

## 2022-04-12 DIAGNOSIS — J45.909 UNCOMPLICATED ASTHMA, UNSPECIFIED ASTHMA SEVERITY, UNSPECIFIED WHETHER PERSISTENT: ICD-10-CM

## 2022-04-12 PROCEDURE — 94729 DIFFUSING CAPACITY: CPT | Performed by: INTERNAL MEDICINE

## 2022-04-12 PROCEDURE — 99215 OFFICE O/P EST HI 40 MIN: CPT | Performed by: INTERNAL MEDICINE

## 2022-04-12 PROCEDURE — 3017F COLORECTAL CA SCREEN DOC REV: CPT | Performed by: INTERNAL MEDICINE

## 2022-04-12 PROCEDURE — G8427 DOCREV CUR MEDS BY ELIG CLIN: HCPCS | Performed by: INTERNAL MEDICINE

## 2022-04-12 PROCEDURE — 94060 EVALUATION OF WHEEZING: CPT | Performed by: INTERNAL MEDICINE

## 2022-04-12 PROCEDURE — 94726 PLETHYSMOGRAPHY LUNG VOLUMES: CPT | Performed by: INTERNAL MEDICINE

## 2022-04-12 PROCEDURE — 1036F TOBACCO NON-USER: CPT | Performed by: INTERNAL MEDICINE

## 2022-04-12 PROCEDURE — G8419 CALC BMI OUT NRM PARAM NOF/U: HCPCS | Performed by: INTERNAL MEDICINE

## 2022-04-12 RX ORDER — BUDESONIDE AND FORMOTEROL FUMARATE DIHYDRATE 80; 4.5 UG/1; UG/1
2 AEROSOL RESPIRATORY (INHALATION) 2 TIMES DAILY
Qty: 10.2 G | Refills: 5 | Status: SHIPPED | OUTPATIENT
Start: 2022-04-12

## 2022-04-12 ASSESSMENT — SLEEP AND FATIGUE QUESTIONNAIRES
ESS TOTAL SCORE: 5
HOW LIKELY ARE YOU TO NOD OFF OR FALL ASLEEP WHILE SITTING AND TALKING TO SOMEONE: 0
HOW LIKELY ARE YOU TO NOD OFF OR FALL ASLEEP WHILE SITTING AND READING: 0
HOW LIKELY ARE YOU TO NOD OFF OR FALL ASLEEP WHILE WATCHING TV: 1
HOW LIKELY ARE YOU TO NOD OFF OR FALL ASLEEP IN A CAR, WHILE STOPPED FOR A FEW MINUTES IN TRAFFIC: 0
HOW LIKELY ARE YOU TO NOD OFF OR FALL ASLEEP WHILE LYING DOWN TO REST IN THE AFTERNOON WHEN CIRCUMSTANCES PERMIT: 3
HOW LIKELY ARE YOU TO NOD OFF OR FALL ASLEEP WHEN YOU ARE A PASSENGER IN A CAR FOR AN HOUR WITHOUT A BREAK: 1
HOW LIKELY ARE YOU TO NOD OFF OR FALL ASLEEP WHILE SITTING QUIETLY AFTER LUNCH WITHOUT ALCOHOL: 0
HOW LIKELY ARE YOU TO NOD OFF OR FALL ASLEEP WHILE SITTING INACTIVE IN A PUBLIC PLACE: 0

## 2022-04-12 NOTE — LETTER
[address] [facility] [address]  Phone: [phone]  Fax: [phone]    Roman Holt MD    April 12, 2022     Michael Marcial, APRN - CNP  [address]    Patient: Joanie Das   MR Number: [id number]   YOB: 1960   Date of Visit: 4/12/2022       Dear Michael Marcial:    Thank you for referring [facility] to me for evaluation/treatment. Below are the relevant portions of my assessment and plan of care. If you have questions, please do not hesitate to call me. I look forward to following Malick Camargo along with you.     Sincerely,      Roman Holt MD

## 2022-04-12 NOTE — PROGRESS NOTES
OUTPATIENT PULMONARY CONSULT NOTE      Patient:  John Garcia  MRN: [id number]    Consulting Physician: MARCI Van CNP  Reason for Consult: Dyspnea/left lower lobe atelectasis  Primacy Care Physician: MARCI Van CNP    HISTORY OF PRESENT ILLNESS:   The patient is a 57 y.o. male he was recently seen in the hospital when he was admitted with acute hypoxic respiratory failure treated for CHF/pulmonary edema and requiring as high as 6 L of oxygen also had wheezing when he was in the hospital chest x-ray and CT scan of the chest shows left lower lobe basilar atelectasis which was improving. He was discharged from the hospital and follow-up care today. When he was discharged from the hospital he had a home O2 evaluation done and he did not qualify for the home oxygen. He was supposed to have a CT scan of the chest for follow-up of left subsegmental basilar atelectasis/infiltrate but CT scan has not been done. According to patient he does not complain of cough he denies wheezing. He does not complain of shortness of breath he is able to do regular activities according to patient at work he is able to walk he think that he can walk more than 1 block easily does not use any stairs. He denies chronic cough denies sputum production denies change in color of the sputum. He denies nocturnal awakening with cough wheezing chest tightness or shortness of breath. When he was in the hospital he was prescribed Symbicort antibody patient he is not using Symbicort at this time denies previous use of inhalers. He denies previous history of COPD or asthma and he is a non-smoker. He does have history of smoking exposure from his parents and his wife. His pulmonary function test today shows consistent with moderate obstruction with airway reversibility with airway trapping and mild to borderline reduction in diffusion capacity.     Recent hospitalization summary 02/26/2022.  57 y.o. male presented with shortness of breath, sudden onset associated with productive cough yellow-colored sputum. He has past medical history of CHFrEF (EF 89-10%, grade 3 diastolic dysfunction), ICM, multivessel CAD s/p NOHEMY 2018, A. fib on Xarelto. Patient was hypoxic desaturated into 80s was placed on 6 L oxygen in the ED. Had diffuse wheezes on auscultation bilaterally. EKG revealed A. fib with RVR heart rate 120s. Chest x-ray was unremarkable on admission. CT PE revealed partial collapse of anterior basal segment of left lower lobe likely secondary to mucous plugging  He was positive for entero-/rhinovirus while he was hospitalized repeat chest x-ray was showing improvement left basilar atelectasis/infiltrate. During hospitalization he was treated for atrial fibrillation and CHF echocardiogram showed EF of 25 to 30% with severe LV systolic dysfunction and on Xarelto for atrial fibrillation       Past Medical History:        Diagnosis Date    Atrial fibrillation ([facility] [facility]     CAD (coronary artery disease)     CHF (congestive heart failure) ([facility] [facility] 2/10/2015    EF 15-20%     Hyperlipidemia     Hypertension     S/P cardiac cath 01/23/2018       Past Surgical History:        Procedure Laterality Date    ABLATION OF DYSRHYTHMIC FOCUS      CORONARY ANGIOPLASTY WITH STENT PLACEMENT  01/23/2018    X 2    HERNIA REPAIR      TONSILLECTOMY      VASECTOMY  [id number]       Allergies:     Allergies   Allergen Reactions    Codeine Other (See Comments)     \"upset stomach\"         Home Meds:   Outpatient Encounter Medications as of 4/12/2022   Medication Sig Dispense Refill    aspirin 81 MG chewable tablet Take 1 tablet by mouth daily 30 tablet 3    atorvastatin (LIPITOR) 40 MG tablet Take 1 tablet by mouth nightly 30 tablet 3    metoprolol succinate (TOPROL XL) 25 MG extended release tablet Take 3 tablets by mouth daily 30 tablet 3    rivaroxaban (XARELTO) 20 MG TABS tablet Take 20 mg by mouth Daily with supper      lisinopril (PRINIVIL;ZESTRIL) 5 MG tablet Take 5 mg by mouth daily      nitroGLYCERIN (NITROSTAT) 0.4 MG SL tablet Place 0.4 mg under the tongue every 5 minutes as needed for Chest pain up to max of 3 total doses. If no relief after 1 dose, call 911. No facility-administered encounter medications on file as of 4/12/2022. Social History:   TOBACCO:   reports that he has never smoked. He has never used smokeless tobacco.  ETOH:   reports current alcohol use.   OCCUPATION: He works in [employer] in [address] [place] [address] [employer] History:       Problem Relation Age of Onset    Cancer Mother         breast metastatic    Heart Disease Father        Immunizations:    Immunization History   Administered Date(s) Administered    Pneumococcal Polysaccharide ([id number]) 02/11/2015    Tdap (Boostrix, Adacel) 01/27/2017         REVIEW OF SYSTEMS:  CONSTITUTIONAL:  negative for  fevers, chills, sweats, fatigue, anorexia, and weight loss  EYES:  negative for  double vision, blurred vision, dry eyes, eye discharge, visual disturbance, redness, and icterus  HEENT:  negative for  hearing loss, tinnitus, ear drainage, earaches, nasal congestion, epistaxis, sore throat, hoarseness, voice change, and postnasal drip  RESPIRATORY:  negative for  dry cough, cough with sputum, dyspnea, wheezing, hemoptysis, chest pain, and pleuritic pain  CARDIOVASCULAR:  negative for  chest pain, dyspnea, palpitations, orthopnea, PND, exertional chest pressure/discomfort, fatigue, edema, syncope  GASTROINTESTINAL:  negative for nausea, vomiting, diarrhea, constipation, abdominal pain, abdominal mass, abdominal distention, jaundice, dysphagia, reflux, odynophagia, hematemesis, and hemtochezia  GENITOURINARY:  negative for frequency, dysuria, nocturia, and hematuria  HEMATOLOGIC/LYMPHATIC:  negative for easy bruising, bleeding, lymphadenopathy, and petechiae  ALLERGIC/IMMUNOLOGIC:  negative for recurrent infections, urticaria, hay fever, angioedema, anaphylaxis, and drug reactions  ENDOCRINE:  negative for heat intolerance, cold intolerance, tremor, and weight changes  MUSCULOSKELETAL:  negative for  myalgias, arthralgias, joint swelling, stiff joints, and muscle weakness  NEUROLOGICAL:  negative for headaches, dizziness, seizures, memory problems, speech problems, visual disturbance, gait problems, tremor, dysphagia, weakness, numbness, syncope, and tingling  BEHAVIOR/PSYCH:  negative for decreased sleep, decreased energy level, increased energy level. Physical Exam:    Vitals: /82 (Site: Right Upper Arm, Position: Sitting, Cuff Size: Medium Adult)   Pulse 77   Resp 18   Ht 5' 6\" (1.676 m)   Wt 170 lb (77.1 kg)   SpO2 95% Comment: ra  BMI 27.44 kg/m²   Last 3 weights: Wt Readings from Last 3 Encounters:   04/12/22 170 lb (77.1 kg)   03/11/22 176 lb (79.8 kg)   03/01/22 175 lb 6.4 oz (79.6 kg)     Body mass index is 27.44 kg/m². Physical Examination:   General appearance - alert, well appearing, and in no distress, overweight and acyanotic, in no respiratory distress  Mental status - alert, oriented to person, place, and time  Eyes - pupils equal and reactive, extraocular eye movements intact  Ears - right ear normal, left ear normal  Nose - normal and patent, no erythema, discharge or polyps  Mouth - mucous membranes moist, pharynx normal without lesions  Neck - supple, no significant adenopathy  Chest - no tachypnea, retractions or cyanosis bilateral symmetrical chest movement, normal resonance on percussion, air entry is present bilaterally and symmetrical, no expiratory wheezing rhonchi or crackles.   Heart - normal rate, regular rhythm, normal S1, S2, no murmurs, rubs, clicks or gallops  Abdomen - soft, nontender, nondistended, no masses or organomegaly  Neurological - alert, oriented, normal speech, no focal findings or movement disorder noted}  Extremities - peripheral pulses normal, no pedal edema, no clubbing or cyanosis  Skin - normal coloration and turgor, no rashes, no suspicious skin lesions noted       LABS:    CBC:   WBC   Date Value Ref Range Status   03/01/2022 9.1 3.5 - 11.3 k/uL Final   02/28/2022 9.0 3.5 - 11.3 k/uL Final   02/27/2022 8.9 3.5 - 11.3 k/uL Final     Hemoglobin   Date Value Ref Range Status   03/01/2022 15.4 13.0 - 17.0 g/dL Final   02/28/2022 14.6 13.0 - 17.0 g/dL Final   02/27/2022 14.6 13.0 - 17.0 g/dL Final     Platelets   Date Value Ref Range Status   03/01/2022 246 138 - 453 k/uL Final   02/28/2022 214 138 - 453 k/uL Final   02/27/2022 192 138 - 453 k/uL Final     BMP:   Sodium   Date Value Ref Range Status   03/01/2022 139 135 - 144 mmol/L Final   02/28/2022 133 (L) 135 - 144 mmol/L Final   02/28/2022 139 135 - 144 mmol/L Final     Potassium   Date Value Ref Range Status   03/01/2022 5.1 3.7 - 5.3 mmol/L Final   02/28/2022 4.4 3.7 - 5.3 mmol/L Final   02/28/2022 4.8 3.7 - 5.3 mmol/L Final     Chloride   Date Value Ref Range Status   03/01/2022 103 98 - 107 mmol/L Final   02/28/2022 98 98 - 107 mmol/L Final   02/28/2022 103 98 - 107 mmol/L Final     CO2   Date Value Ref Range Status   03/01/2022 22 20 - 31 mmol/L Final   02/28/2022 23 20 - 31 mmol/L Final   02/28/2022 24 20 - 31 mmol/L Final     BUN   Date Value Ref Range Status   03/01/2022 24 (H) 8 - 23 mg/dL Final   02/28/2022 22 8 - 23 mg/dL Final   02/28/2022 19 8 - 23 mg/dL Final     CREATININE   Date Value Ref Range Status   03/01/2022 0.75 0.70 - 1.20 mg/dL Final   02/28/2022 0.69 (L) 0.70 - 1.20 mg/dL Final   02/28/2022 0.61 (L) 0.70 - 1.20 mg/dL Final     Glucose   Date Value Ref Range Status   03/01/2022 172 (H) 70 - 99 mg/dL Final   02/28/2022 296 (H) 70 - 99 mg/dL Final   02/28/2022 142 (H) 70 - 99 mg/dL Final     Hepatic:     Amylase: No results found for: AMYLASE  Lipase: No results found for: LIPASE  CARDIAC ENZYMES: No results found for: CKTOTAL, CKMB, CKMBINDEX, TROPONINI  BNP: No results found for: BNP  Lipids:       INR: No results found for: INR  Thyroid:   TSH   Date Value Ref Range Status   02/26/2022 2.55 0.30 - 5.00 mIU/L Final     Urinalysis:     Cultures:-  -----------------------------------------------------------------    ABGs: No results found for: PHART, PO2ART, EPX2CLV    Pulmonary Functions Testing Results:    No results found for: FEV1, FVC, CKB1OAJ, TLC, DLCO    CXR  Chest x-ray 02/26/2022  The lungs are without acute focal process.  There is no effusion or   pneumothorax. The cardiomediastinal silhouette is without acute process. The   osseous structures are without acute process.  There is no change from prior   examination. CT Scans  CTA chest 02/27/2022. 1. Partial collapse of the anterior basal segment of the left lower lobe   likely due to mucous plugging. ECHO:      Assessment and Plan         1. Acute hypoxic respiratory failure   2. Uncomplicated asthma, unspecified asthma severity, unspecified whether persistent    3. Atelectasis      Assessment:    Recent admission to hospital with acute hypoxic respiratory failure dyspnea multifactorial with acute CHF/left basilar atelectasis/pneumonia/positive for enterorhinovirus. Was discharged home without oxygen no previous history of COPD or asthma and not taking inhalers before non-smoker but history of secondhand smoking exposure currently denies symptoms. Pulmonary function test done today shows moderate obstruction 50% postbronchodilator FEV1 was 59% with 18% improvement also increased residual volume suggestive of airway trapping and normal total lung capacity diffusion capacity is mildly reduced which could be secondary to history of CHF and possibly contributing pulmonary hypertension. When patient was in the hospital he had wheezing mucoid secretions sputum and he most likely have underlying asthma/airway reversibility discussed with him pulmonary function test finding today and will prescribe him ICS/LABA and to see the response.   He has severe LV dysfunction/ischemic cardiomyopathy advised to follow-up with cardiology for optimization of cardiac medication and on atrial fibrillation on chronic anticoagulation with Xarelto. CT scan of the chest had shown that he had left basilar atelectasis not very apparent on chest x-ray so repeat chest x-ray was supposed to be done in 6 weeks patient had not done CT scan of the chest and will order it again and is scheduled next 1 week. Plan and recommendation:    Start Symbicort 80/4.52 puff twice daily. Technique of using inhaler discussed with  Compliance with medication discussed. Albuterol to be used as needed. CT scan of the chest for follow-up of left basilar atelectasis. Vaccinations recommended annually for flu in fall  COVID vaccination/booster recommended  Maintain an active lifestyle   PFT's reviewed from 04/12/2022  CXR reviewed from February 2022  CT chest reviewed from February 2022  Questions answered pertaining to diagnosis and management explained importance of compliance with therapy       RTC 2 months    It was my pleasure to evaluate Lisa Read today. Please call with questions. Nicole Bowman MD, MD             4/12/2022, 8:58 AM    Please note that this chart was generated using voice recognition Dragon dictation software. Although every effort was made to ensure the accuracy of this automated transcription, some errors in transcription may have occurred.

## 2022-04-12 NOTE — PATIENT INSTRUCTIONS
Prev CT scan order needs to be changed to CT with OR CT W/O   Order was changed, called pt back and transferred him to [address] [place] [address] scheduling dept.    LS

## 2022-04-26 ENCOUNTER — TELEPHONE (OUTPATIENT)
Dept: PULMONOLOGY | Age: 62
End: 2022-04-26

## 2022-05-16 RX ORDER — METOPROLOL SUCCINATE 50 MG/1
TABLET, EXTENDED RELEASE ORAL
COMMUNITY
Start: 2022-04-01

## 2022-05-16 RX ORDER — SPIRONOLACTONE 25 MG/1
TABLET ORAL
COMMUNITY
Start: 2022-04-01

## 2022-12-02 ENCOUNTER — TELEPHONE (OUTPATIENT)
Dept: FAMILY MEDICINE CLINIC | Age: 62
End: 2022-12-02

## 2022-12-02 NOTE — TELEPHONE ENCOUNTER
Attempted to contact pt in regards to being overdue for an appointment. Pt is due for A1C, colon, and depression screen. Needs to schedule an appointment.

## 2023-03-08 RX ORDER — DILTIAZEM HYDROCHLORIDE 60 MG/1
TABLET, FILM COATED ORAL
Qty: 1 EACH | Refills: 1 | Status: SHIPPED | OUTPATIENT
Start: 2023-03-08

## 2023-03-08 NOTE — TELEPHONE ENCOUNTER
LAST VISIT: 4/12/22  No follow up scheduled. Patient cancelled once and no showed once since last visit. Writer called patient on mobile number and got voicemail which only lists phone number. Unable to leave a message as communication paperwork states to only speak with patient. Per last dictation patient is on this medication. Please sign for refill if ok. Thank you.

## 2023-04-18 ENCOUNTER — OFFICE VISIT (OUTPATIENT)
Dept: FAMILY MEDICINE CLINIC | Age: 63
End: 2023-04-18
Payer: COMMERCIAL

## 2023-04-18 VITALS
DIASTOLIC BLOOD PRESSURE: 80 MMHG | HEIGHT: 66 IN | WEIGHT: 168 LBS | SYSTOLIC BLOOD PRESSURE: 120 MMHG | HEART RATE: 74 BPM | OXYGEN SATURATION: 95 % | BODY MASS INDEX: 27 KG/M2

## 2023-04-18 DIAGNOSIS — Z12.12 ENCOUNTER FOR SCREENING FOR COLORECTAL MALIGNANT NEOPLASM: ICD-10-CM

## 2023-04-18 DIAGNOSIS — Z13.29 SCREENING FOR THYROID DISORDER: ICD-10-CM

## 2023-04-18 DIAGNOSIS — S16.1XXA STRAIN OF NECK MUSCLE, INITIAL ENCOUNTER: ICD-10-CM

## 2023-04-18 DIAGNOSIS — R73.03 PRE-DIABETES: ICD-10-CM

## 2023-04-18 DIAGNOSIS — I10 ESSENTIAL (PRIMARY) HYPERTENSION: Primary | ICD-10-CM

## 2023-04-18 DIAGNOSIS — Z13.220 SCREENING FOR HYPERLIPIDEMIA: ICD-10-CM

## 2023-04-18 DIAGNOSIS — Z12.11 ENCOUNTER FOR SCREENING FOR COLORECTAL MALIGNANT NEOPLASM: ICD-10-CM

## 2023-04-18 PROCEDURE — 1036F TOBACCO NON-USER: CPT | Performed by: NURSE PRACTITIONER

## 2023-04-18 PROCEDURE — 3017F COLORECTAL CA SCREEN DOC REV: CPT | Performed by: NURSE PRACTITIONER

## 2023-04-18 PROCEDURE — 3074F SYST BP LT 130 MM HG: CPT | Performed by: NURSE PRACTITIONER

## 2023-04-18 PROCEDURE — 99214 OFFICE O/P EST MOD 30 MIN: CPT | Performed by: NURSE PRACTITIONER

## 2023-04-18 PROCEDURE — G8419 CALC BMI OUT NRM PARAM NOF/U: HCPCS | Performed by: NURSE PRACTITIONER

## 2023-04-18 PROCEDURE — 3079F DIAST BP 80-89 MM HG: CPT | Performed by: NURSE PRACTITIONER

## 2023-04-18 PROCEDURE — G8427 DOCREV CUR MEDS BY ELIG CLIN: HCPCS | Performed by: NURSE PRACTITIONER

## 2023-04-18 RX ORDER — DOFETILIDE 0.5 MG/1
500 CAPSULE ORAL EVERY 12 HOURS
COMMUNITY
Start: 2023-04-07

## 2023-04-18 RX ORDER — ACETAMINOPHEN 500 MG
500 TABLET ORAL 4 TIMES DAILY PRN
Qty: 40 TABLET | Refills: 0 | Status: SHIPPED | OUTPATIENT
Start: 2023-04-18

## 2023-04-18 RX ORDER — CYCLOBENZAPRINE HCL 5 MG
5 TABLET ORAL NIGHTLY PRN
Qty: 10 TABLET | Refills: 0 | Status: SHIPPED | OUTPATIENT
Start: 2023-04-18 | End: 2023-04-28

## 2023-04-18 SDOH — ECONOMIC STABILITY: FOOD INSECURITY: WITHIN THE PAST 12 MONTHS, YOU WORRIED THAT YOUR FOOD WOULD RUN OUT BEFORE YOU GOT MONEY TO BUY MORE.: NEVER TRUE

## 2023-04-18 SDOH — ECONOMIC STABILITY: FOOD INSECURITY: WITHIN THE PAST 12 MONTHS, THE FOOD YOU BOUGHT JUST DIDN'T LAST AND YOU DIDN'T HAVE MONEY TO GET MORE.: NEVER TRUE

## 2023-04-18 SDOH — ECONOMIC STABILITY: HOUSING INSECURITY
IN THE LAST 12 MONTHS, WAS THERE A TIME WHEN YOU DID NOT HAVE A STEADY PLACE TO SLEEP OR SLEPT IN A SHELTER (INCLUDING NOW)?: NO

## 2023-04-18 SDOH — ECONOMIC STABILITY: INCOME INSECURITY: HOW HARD IS IT FOR YOU TO PAY FOR THE VERY BASICS LIKE FOOD, HOUSING, MEDICAL CARE, AND HEATING?: NOT HARD AT ALL

## 2023-04-18 ASSESSMENT — ENCOUNTER SYMPTOMS
PHOTOPHOBIA: 0
VISUAL CHANGE: 0

## 2023-04-18 ASSESSMENT — PATIENT HEALTH QUESTIONNAIRE - PHQ9
SUM OF ALL RESPONSES TO PHQ QUESTIONS 1-9: 0
1. LITTLE INTEREST OR PLEASURE IN DOING THINGS: 0
2. FEELING DOWN, DEPRESSED OR HOPELESS: 0
SUM OF ALL RESPONSES TO PHQ9 QUESTIONS 1 & 2: 0

## 2023-04-18 NOTE — PROGRESS NOTES
I did secure 9-5-17 date, however, was not able to confirm that the date given is the appropriate date for his surgery. That will all be discussed at office visit.   Inna Echeverria (:  1960) is a 60 y.o. male,Established patient, here for evaluation of the following chief complaint(s):  Neck Pain         ASSESSMENT/PLAN:  1. Essential (primary) hypertension  -     CBC; Future  -     Comprehensive Metabolic Panel; Future  2. Screening for hyperlipidemia  -     Lipid, Fasting; Future  3. Screening for thyroid disorder  -     TSH With Reflex Ft4; Future  4. Encounter for screening for colorectal malignant neoplasm  -     Fecal DNA Colorectal cancer screening (Cologuard)  5. Pre-diabetes  -     Microalbumin, Ur; Future  -     Hemoglobin A1C; Future  6. Strain of neck muscle, initial encounter  -     cyclobenzaprine (FLEXERIL) 5 MG tablet; Take 1 tablet by mouth nightly as needed for Muscle spasms, Disp-10 tablet, R-0Normal  -     acetaminophen (TYLENOL) 500 MG tablet; Take 1 tablet by mouth 4 times daily as needed for Pain, Disp-40 tablet, R-0Normal  Neck pain is reproducible with position and palpation. No follow-ups on file. Has a cardio apt coming up       Subjective   SUBJECTIVE/OBJECTIVE:  Neck Pain   This is a new problem. The current episode started yesterday. The problem has been gradually worsening. The pain is associated with an unknown factor. The pain is present in the right side. The quality of the pain is described as aching. The symptoms are aggravated by position. Pertinent negatives include no headaches, photophobia or visual change. He has tried NSAIDs for the symptoms. Review of Systems   Eyes:  Negative for photophobia. Musculoskeletal:  Positive for neck pain. Neurological:  Negative for headaches. Objective   Vitals:    23 1459   BP: 120/80   Pulse: 74   SpO2: 95%     Wt Readings from Last 3 Encounters:   23 168 lb (76.2 kg)   22 170 lb (77.1 kg)   22 176 lb (79.8 kg)       Physical Exam  Constitutional:       General: He is not in acute distress. Appearance: He is well-developed.    HENT:      Head:

## 2023-04-18 NOTE — PROGRESS NOTES
Patient is present complaining of neck pain  Patient is not sure if he did something to it  States this started yesterday morning  Patient states the pain is on the right side  States he has pain turning his head

## 2023-05-15 RX ORDER — DILTIAZEM HYDROCHLORIDE 60 MG/1
TABLET, FILM COATED ORAL
Qty: 10.2 EACH | Refills: 1 | OUTPATIENT
Start: 2023-05-15

## 2023-07-07 DIAGNOSIS — S16.1XXA STRAIN OF NECK MUSCLE, INITIAL ENCOUNTER: ICD-10-CM

## 2023-07-11 RX ORDER — CYCLOBENZAPRINE HCL 5 MG
5 TABLET ORAL NIGHTLY PRN
Qty: 30 TABLET | Refills: 2 | Status: SHIPPED | OUTPATIENT
Start: 2023-07-11 | End: 2023-10-09

## 2023-09-20 ENCOUNTER — TELEPHONE (OUTPATIENT)
Dept: FAMILY MEDICINE CLINIC | Age: 63
End: 2023-09-20

## 2023-09-20 NOTE — TELEPHONE ENCOUNTER
Patient has overdue labs and cologuard. LM for patient to call the office to see if he can get these completed.

## 2024-12-15 ENCOUNTER — HOSPITAL ENCOUNTER (EMERGENCY)
Age: 64
Discharge: HOME | End: 2024-12-15
Attending: STUDENT IN AN ORGANIZED HEALTH CARE EDUCATION/TRAINING PROGRAM
Payer: COMMERCIAL

## 2024-12-15 VITALS
RESPIRATION RATE: 16 BRPM | SYSTOLIC BLOOD PRESSURE: 169 MMHG | DIASTOLIC BLOOD PRESSURE: 83 MMHG | HEART RATE: 83 BPM | OXYGEN SATURATION: 93 % | TEMPERATURE: 98.4 F

## 2024-12-15 DIAGNOSIS — R31.0 GROSS HEMATURIA: ICD-10-CM

## 2024-12-15 DIAGNOSIS — R33.9 URINARY RETENTION: Primary | ICD-10-CM

## 2024-12-15 LAB
ANION GAP SERPL CALCULATED.3IONS-SCNC: 11 MMOL/L (ref 9–16)
BACTERIA URNS QL MICRO: NORMAL
BASOPHILS # BLD: 0.06 K/UL (ref 0–0.2)
BASOPHILS NFR BLD: 1 % (ref 0–2)
BILIRUB UR QL STRIP: NEGATIVE
BUN SERPL-MCNC: 27 MG/DL (ref 8–23)
CALCIUM SERPL-MCNC: 9.6 MG/DL (ref 8.6–10.4)
CASTS #/AREA URNS LPF: NORMAL /LPF (ref 0–8)
CHLORIDE SERPL-SCNC: 102 MMOL/L (ref 98–107)
CLARITY UR: ABNORMAL
CO2 SERPL-SCNC: 25 MMOL/L (ref 20–31)
COLOR UR: ABNORMAL
CREAT SERPL-MCNC: 0.9 MG/DL (ref 0.7–1.2)
EOSINOPHIL # BLD: 0.13 K/UL (ref 0–0.44)
EOSINOPHILS RELATIVE PERCENT: 2 % (ref 1–4)
EPI CELLS #/AREA URNS HPF: NORMAL /HPF (ref 0–5)
ERYTHROCYTE [DISTWIDTH] IN BLOOD BY AUTOMATED COUNT: 12.1 % (ref 11.8–14.4)
GFR, ESTIMATED: >90 ML/MIN/1.73M2
GLUCOSE SERPL-MCNC: 148 MG/DL (ref 74–99)
GLUCOSE UR STRIP-MCNC: NEGATIVE MG/DL
HCT VFR BLD AUTO: 44.7 % (ref 40.7–50.3)
HGB BLD-MCNC: 14.8 G/DL (ref 13–17)
HGB UR QL STRIP.AUTO: ABNORMAL
IMM GRANULOCYTES # BLD AUTO: <0.03 K/UL (ref 0–0.3)
IMM GRANULOCYTES NFR BLD: 0 %
KETONES UR STRIP-MCNC: NEGATIVE MG/DL
LEUKOCYTE ESTERASE UR QL STRIP: NEGATIVE
LYMPHOCYTES NFR BLD: 1.18 K/UL (ref 1.1–3.7)
LYMPHOCYTES RELATIVE PERCENT: 16 % (ref 24–43)
MCH RBC QN AUTO: 32.3 PG (ref 25.2–33.5)
MCHC RBC AUTO-ENTMCNC: 33.1 G/DL (ref 28.4–34.8)
MCV RBC AUTO: 97.6 FL (ref 82.6–102.9)
MONOCYTES NFR BLD: 0.4 K/UL (ref 0.1–1.2)
MONOCYTES NFR BLD: 5 % (ref 3–12)
NEUTROPHILS NFR BLD: 76 % (ref 36–65)
NEUTS SEG NFR BLD: 5.61 K/UL (ref 1.5–8.1)
NITRITE UR QL STRIP: NEGATIVE
NRBC BLD-RTO: 0 PER 100 WBC
PH UR STRIP: 5 [PH] (ref 5–8)
PLATELET # BLD AUTO: 225 K/UL (ref 138–453)
PMV BLD AUTO: 10.7 FL (ref 8.1–13.5)
POTASSIUM SERPL-SCNC: 3.9 MMOL/L (ref 3.7–5.3)
PROT UR STRIP-MCNC: ABNORMAL MG/DL
RBC # BLD AUTO: 4.58 M/UL (ref 4.21–5.77)
RBC #/AREA URNS HPF: NORMAL /HPF (ref 0–4)
SODIUM SERPL-SCNC: 138 MMOL/L (ref 136–145)
SP GR UR STRIP: 1.01 (ref 1–1.03)
UROBILINOGEN UR STRIP-ACNC: NORMAL EU/DL (ref 0–1)
WBC #/AREA URNS HPF: NORMAL /HPF (ref 0–5)
WBC OTHER # BLD: 7.4 K/UL (ref 3.5–11.3)

## 2024-12-15 PROCEDURE — 85025 COMPLETE CBC W/AUTO DIFF WBC: CPT

## 2024-12-15 PROCEDURE — 81001 URINALYSIS AUTO W/SCOPE: CPT

## 2024-12-15 PROCEDURE — 51798 US URINE CAPACITY MEASURE: CPT

## 2024-12-15 PROCEDURE — 80048 BASIC METABOLIC PNL TOTAL CA: CPT

## 2024-12-15 PROCEDURE — 99283 EMERGENCY DEPT VISIT LOW MDM: CPT

## 2024-12-15 NOTE — ED PROVIDER NOTES
[facility]     Emergency Department     Faculty Attestation    I performed a history and physical examination of the patient and discussed management with the resident. I have reviewed and agree with the resident’s findings including all diagnostic interpretations, and treatment plans as written at the time of my review. Any areas of disagreement are noted on the chart. I was personally present for the key portions of any procedures. I have documented in the chart those procedures where I was not present during the key portions. For Physician Assistant/ Nurse Practitioner cases/documentation I have personally evaluated this patient and have completed at least one if not all key elements of the E/M (history, physical exam, and MDM). Additional findings are as noted.    PtName: Miko Jacobsen  MRN: [id number]  Birthdate 1960  Date of evaluation: 12/15/24  Note Started: 6:12 PM EST    Primary Care Physician: Christopher Callahan APRN - CNP    Brief HPI:  64-year-old male presents emergency department with painless hematuria.  He is currently taking anticoagulation for history of atrial fibrillation.  He also reports difficulty urinating.  Denies any fevers, chills, or flank pain.  Denies history of hematuria.    Pertinent Physical Exam Findings:  Vitals:    12/15/24 1826   BP: (!) 169/83   Pulse: 83   Resp: 16   Temp: 98.4 °F (36.9 °C)   SpO2: 93%   Appears well, resting comfortably, no acute distress.    Medical Decision Making: Patient is a 64 y.o. male presenting to the emergency department with painless hematuria. The chart was reviewed for pertinent history relating to the chief complaint.  Plan to check to CBC, BMP, urinalysis, and measure postvoid residual.    All results, including labs (if ordered), imaging (if ordered), and EKGs (if ordered) were independently interpreted by me.  See radiologist report for additional details on imaging studies.      (Please note

## 2024-12-15 NOTE — ED PROVIDER NOTES
[facility] EMERGENCY DEPARTMENT  Emergency Department Encounter  Emergency Medicine Resident     Pt Name:Miko Jacobsen  MRN: [id number]  Birthdate 1960  Date of evaluation: 12/15/24  PCP:  Christopher Callahan APRN - CNP  Note Started: 6:11 PM EST      CHIEF COMPLAINT       Chief Complaint   Patient presents with    Hematuria       HISTORY OF PRESENT ILLNESS  (Location/Symptom, Timing/Onset, Context/Setting, Quality, Duration, Modifying Factors, Severity.)      Miko Jacobsen is a 64 y.o. male who presents with hematuria.  Patient states for the past 2 weeks he has had difficulty voiding.  States that when he attempts to urinate he has inability to pass urine.  States that when he attempts again he can void.  States this has been going on intermittently for the past 2 weeks.  States prior to this had noted small pebble-like structures in his briefs.  Shows picture that are approximately 1 mm.  Never had history of similar.  Patient states that starting today at 3 PM he had episode of hematuria.  States his urine was pink.  States he had urinated just prior to arrival with pink urine once again.  Denies any other symptoms.  No fever or chills.  No abdominal pain or flank pain.  No history of similar.  No chest pain or shortness of breath.    PAST MEDICAL / SURGICAL / SOCIAL / FAMILY HISTORY      has a past medical history of Atrial fibrillation (HCC), CAD (coronary artery disease), CHF (congestive heart failure) (HCC), Hyperlipidemia, Hypertension, and S/P cardiac cath.       has a past surgical history that includes Vasectomy (8101-1081); Tonsillectomy; hernia repair; Coronary angioplasty with stent (01/23/2018); and ablation of dysrhythmic focus.      Social History     Socioeconomic History    Marital status: Single     Spouse name: Not on file    Number of children: Not on file    Years of education: Not on file    Highest education level: Not on file   Occupational History    Not on file   T[employer]

## 2024-12-16 ASSESSMENT — ENCOUNTER SYMPTOMS
BACK PAIN: 0
VOMITING: 0
ABDOMINAL PAIN: 0
NAUSEA: 0
ABDOMINAL DISTENTION: 0
CONSTIPATION: 0
SHORTNESS OF BREATH: 0

## 2024-12-16 NOTE — DISCHARGE INSTRUCTIONS
Call and schedule appointment with you with the urologist tomorrow to be seen as soon as possible.    If you had a carr catheter placed in the emergency department, then make sure that you drain the leg bag when it gets full.   Contact the urologist as indicated for follow up.     PLEASE RETURN TO THE EMERGENCY DEPARTMENT IMMEDIATELY for worsening symptoms, inability to urinate, worsening of blood in your urine, or if you develop any concerning symptoms such as: high fever not relieved by acetaminophen (Tylenol) and/or ibuprofen (Motrin / Advil), chills, shortness of breath, chest pain, feeling of your heart fluttering or racing, persistent nausea and/or vomiting, vomiting up blood, blood in your stool, loss of consciousness, numbness, weakness or tingling in the arms or legs or change in color of the extremities, changes in mental status, persistent headache, blurry vision, loss of bladder / bowel

## 2024-12-16 NOTE — ED NOTES
Pt arrives to ED 26 via triage.   Pt co hematuria.   Pt states that he recently passed 4-5 kidney stones a few days ago.  Pt states that the past few days, he has been urinating more frequently, but feels as though he is not emptying his bladder.   Pt denies taking any meds for pain.   Pt respirations are even and unlabored, pt is alert and oriented X 4, speaking in complete sentences, bed is in the lowest position, call light is within reach, NAD noted.   Will continue to follow plan of care.